# Patient Record
Sex: MALE | Race: BLACK OR AFRICAN AMERICAN | NOT HISPANIC OR LATINO | Employment: PART TIME | URBAN - METROPOLITAN AREA
[De-identification: names, ages, dates, MRNs, and addresses within clinical notes are randomized per-mention and may not be internally consistent; named-entity substitution may affect disease eponyms.]

---

## 2017-02-24 RX ORDER — ALLOPURINOL 100 MG/1
TABLET ORAL
Qty: 90 TABLET | Refills: 3 | Status: SHIPPED | OUTPATIENT
Start: 2017-02-24 | End: 2018-02-19 | Stop reason: SDUPTHER

## 2017-03-20 RX ORDER — SIMVASTATIN 40 MG/1
TABLET, FILM COATED ORAL
Qty: 90 TABLET | Refills: 0 | Status: SHIPPED | OUTPATIENT
Start: 2017-03-20 | End: 2017-09-01 | Stop reason: SDUPTHER

## 2017-06-12 RX ORDER — MELOXICAM 7.5 MG/1
TABLET ORAL
Qty: 90 TABLET | Refills: 0 | Status: SHIPPED | OUTPATIENT
Start: 2017-06-12 | End: 2017-09-01 | Stop reason: SDUPTHER

## 2017-06-12 RX ORDER — LISINOPRIL AND HYDROCHLOROTHIAZIDE 10; 12.5 MG/1; MG/1
TABLET ORAL
Qty: 90 TABLET | Refills: 0 | Status: SHIPPED | OUTPATIENT
Start: 2017-06-12 | End: 2017-09-01 | Stop reason: SDUPTHER

## 2017-09-05 RX ORDER — SIMVASTATIN 40 MG/1
TABLET, FILM COATED ORAL
Qty: 90 TABLET | Refills: 0 | Status: SHIPPED | OUTPATIENT
Start: 2017-09-05 | End: 2017-12-06 | Stop reason: SDUPTHER

## 2017-09-05 RX ORDER — LISINOPRIL AND HYDROCHLOROTHIAZIDE 10; 12.5 MG/1; MG/1
TABLET ORAL
Qty: 90 TABLET | Refills: 0 | Status: SHIPPED | OUTPATIENT
Start: 2017-09-05 | End: 2017-12-02 | Stop reason: SDUPTHER

## 2017-09-05 RX ORDER — MELOXICAM 7.5 MG/1
TABLET ORAL
Qty: 90 TABLET | Refills: 0 | Status: SHIPPED | OUTPATIENT
Start: 2017-09-05 | End: 2017-12-02 | Stop reason: SDUPTHER

## 2017-12-03 RX ORDER — LISINOPRIL AND HYDROCHLOROTHIAZIDE 10; 12.5 MG/1; MG/1
TABLET ORAL
Qty: 90 TABLET | Refills: 0 | Status: SHIPPED | OUTPATIENT
Start: 2017-12-03 | End: 2018-03-04 | Stop reason: SDUPTHER

## 2017-12-03 RX ORDER — MELOXICAM 7.5 MG/1
TABLET ORAL
Qty: 90 TABLET | Refills: 0 | Status: SHIPPED | OUTPATIENT
Start: 2017-12-03 | End: 2018-03-04 | Stop reason: SDUPTHER

## 2017-12-06 RX ORDER — SIMVASTATIN 40 MG/1
TABLET, FILM COATED ORAL
Qty: 90 TABLET | Refills: 0 | Status: SHIPPED | OUTPATIENT
Start: 2017-12-06 | End: 2018-03-04 | Stop reason: SDUPTHER

## 2018-02-19 RX ORDER — ALLOPURINOL 100 MG/1
TABLET ORAL
Qty: 90 TABLET | Refills: 3 | Status: SHIPPED | OUTPATIENT
Start: 2018-02-19 | End: 2019-03-05 | Stop reason: SDUPTHER

## 2018-03-05 RX ORDER — MELOXICAM 7.5 MG/1
TABLET ORAL
Qty: 90 TABLET | Refills: 0 | Status: SHIPPED | OUTPATIENT
Start: 2018-03-05 | End: 2018-08-21

## 2018-03-05 RX ORDER — SIMVASTATIN 40 MG/1
TABLET, FILM COATED ORAL
Qty: 90 TABLET | Refills: 0 | Status: SHIPPED | OUTPATIENT
Start: 2018-03-05 | End: 2018-06-19 | Stop reason: SDUPTHER

## 2018-03-05 RX ORDER — LISINOPRIL AND HYDROCHLOROTHIAZIDE 10; 12.5 MG/1; MG/1
TABLET ORAL
Qty: 90 TABLET | Refills: 0 | Status: SHIPPED | OUTPATIENT
Start: 2018-03-05 | End: 2018-06-19

## 2018-06-05 RX ORDER — LISINOPRIL AND HYDROCHLOROTHIAZIDE 10; 12.5 MG/1; MG/1
TABLET ORAL
Qty: 90 TABLET | Refills: 0 | OUTPATIENT
Start: 2018-06-05

## 2018-06-05 RX ORDER — MELOXICAM 7.5 MG/1
TABLET ORAL
Qty: 90 TABLET | Refills: 0 | OUTPATIENT
Start: 2018-06-05

## 2018-06-05 NOTE — TELEPHONE ENCOUNTER
Called and spoke with pt wife.  Left message informing Mrs Hurst that Dr Majano is unable to fill his rx because he haven't been seen since 2015.  I also informed her that an appt is scheduling for 06/11/18 @ 7:40, she stated she will give him the message.

## 2018-06-05 NOTE — TELEPHONE ENCOUNTER
Please call patient to inform him that his medication has denied until he comes in for an appt.  Patient has been scheduled for 7:40am, on 6/11.  Please call patient to notify him of his appt. thanks

## 2018-06-08 ENCOUNTER — PATIENT OUTREACH (OUTPATIENT)
Dept: ADMINISTRATIVE | Facility: HOSPITAL | Age: 78
End: 2018-06-08

## 2018-06-08 NOTE — PROGRESS NOTES
[facility] is committed to your overall health.  To help you get the most out of each of your visits, we will review your information to make sure you are up to date on all of your recommended tests and/or procedures.       Your PCP  Samantha Majano MD   found that you may be due for:       Health Maintenance Due   Topic Date Due    TETANUS VACCINE  10/26/1958    Zoster Vaccine  10/26/1998    Pneumococcal (65+) (1 of 2 - PCV13) 10/26/2003     Medicare does not cover all immunizations to be given in the clinic. Check your benefits to ensure that you do not need to receive your immunizations at the pharmacy.          If you have had any of the above done at another facility, please bring the records or information with you so that your record at [facility] will be complete.  If you would like to schedule any of these, please contact me.     If you are currently taking medication, please bring it with you to your appointment for review.     Also, if you have any type of Advanced Directives, please bring them with you to your office visit so we may scan them into your chart.       Thank you for Choosing [facility] for your healthcare needs.        Additional Information  If you have questions, you can email [email or web address] or call [phone]  to talk to our [facility] staff. Remember, [facility] is NOT to be used for urgent needs. For medical emergencies, dial 911.

## 2018-06-19 ENCOUNTER — LAB VISIT (OUTPATIENT)
Dept: LAB | Facility: OTHER | Age: 78
End: 2018-06-19
Attending: INTERNAL MEDICINE
Payer: MEDICARE

## 2018-06-19 ENCOUNTER — TELEPHONE (OUTPATIENT)
Dept: INTERNAL MEDICINE | Facility: CLINIC | Age: 78
End: 2018-06-19

## 2018-06-19 ENCOUNTER — OFFICE VISIT (OUTPATIENT)
Dept: INTERNAL MEDICINE | Facility: CLINIC | Age: 78
End: 2018-06-19
Attending: INTERNAL MEDICINE
Payer: MEDICARE

## 2018-06-19 VITALS
WEIGHT: 246.06 LBS | HEART RATE: 74 BPM | HEIGHT: 74 IN | SYSTOLIC BLOOD PRESSURE: 130 MMHG | BODY MASS INDEX: 31.58 KG/M2 | DIASTOLIC BLOOD PRESSURE: 60 MMHG

## 2018-06-19 DIAGNOSIS — E87.5 HYPERKALEMIA: Primary | ICD-10-CM

## 2018-06-19 DIAGNOSIS — Z00.00 ANNUAL PHYSICAL EXAM: ICD-10-CM

## 2018-06-19 DIAGNOSIS — R73.03 PRE-DIABETES: ICD-10-CM

## 2018-06-19 DIAGNOSIS — M10.071 IDIOPATHIC GOUT OF RIGHT FOOT, UNSPECIFIED CHRONICITY: ICD-10-CM

## 2018-06-19 DIAGNOSIS — Z12.11 COLON CANCER SCREENING: ICD-10-CM

## 2018-06-19 DIAGNOSIS — Z00.00 ANNUAL PHYSICAL EXAM: Primary | ICD-10-CM

## 2018-06-19 DIAGNOSIS — R79.9 ABNORMAL FINDING OF BLOOD CHEMISTRY: ICD-10-CM

## 2018-06-19 DIAGNOSIS — E78.00 HYPERCHOLESTEROLEMIA: ICD-10-CM

## 2018-06-19 DIAGNOSIS — I10 HTN (HYPERTENSION), BENIGN: ICD-10-CM

## 2018-06-19 LAB
ALBUMIN SERPL BCP-MCNC: 4 G/DL
ALP SERPL-CCNC: 129 U/L
ALT SERPL W/O P-5'-P-CCNC: 29 U/L
ANION GAP SERPL CALC-SCNC: 9 MMOL/L
AST SERPL-CCNC: 24 U/L
BASOPHILS # BLD AUTO: 0.07 K/UL
BASOPHILS NFR BLD: 1 %
BILIRUB SERPL-MCNC: 0.6 MG/DL
BUN SERPL-MCNC: 26 MG/DL
CALCIUM SERPL-MCNC: 10.1 MG/DL
CHLORIDE SERPL-SCNC: 107 MMOL/L
CHOLEST SERPL-MCNC: 144 MG/DL
CHOLEST/HDLC SERPL: 4.4 {RATIO}
CO2 SERPL-SCNC: 21 MMOL/L
CREAT SERPL-MCNC: 1.6 MG/DL
DIFFERENTIAL METHOD: ABNORMAL
EOSINOPHIL # BLD AUTO: 0.3 K/UL
EOSINOPHIL NFR BLD: 3.7 %
ERYTHROCYTE [DISTWIDTH] IN BLOOD BY AUTOMATED COUNT: 13.4 %
EST. GFR  (AFRICAN AMERICAN): 47 ML/MIN/1.73 M^2
EST. GFR  (NON AFRICAN AMERICAN): 40 ML/MIN/1.73 M^2
ESTIMATED AVG GLUCOSE: 134 MG/DL
GLUCOSE SERPL-MCNC: 126 MG/DL
HBA1C MFR BLD HPLC: 6.3 %
HCT VFR BLD AUTO: 36.6 %
HDLC SERPL-MCNC: 33 MG/DL
HDLC SERPL: 22.9 %
HGB BLD-MCNC: 11.8 G/DL
LDLC SERPL CALC-MCNC: 90.8 MG/DL
LYMPHOCYTES # BLD AUTO: 2.3 K/UL
LYMPHOCYTES NFR BLD: 31.7 %
MCH RBC QN AUTO: 28.6 PG
MCHC RBC AUTO-ENTMCNC: 32.2 G/DL
MCV RBC AUTO: 89 FL
MONOCYTES # BLD AUTO: 0.6 K/UL
MONOCYTES NFR BLD: 8 %
NEUTROPHILS # BLD AUTO: 4 K/UL
NEUTROPHILS NFR BLD: 55.2 %
NONHDLC SERPL-MCNC: 111 MG/DL
PLATELET # BLD AUTO: 303 K/UL
PMV BLD AUTO: 9.6 FL
POTASSIUM SERPL-SCNC: 5.7 MMOL/L
PROT SERPL-MCNC: 8.3 G/DL
RBC # BLD AUTO: 4.13 M/UL
SODIUM SERPL-SCNC: 137 MMOL/L
TRIGL SERPL-MCNC: 101 MG/DL
TSH SERPL DL<=0.005 MIU/L-ACNC: 2.06 UIU/ML
URATE SERPL-MCNC: 7.6 MG/DL
WBC # BLD AUTO: 7.23 K/UL

## 2018-06-19 PROCEDURE — 80053 COMPREHEN METABOLIC PANEL: CPT

## 2018-06-19 PROCEDURE — 85025 COMPLETE CBC W/AUTO DIFF WBC: CPT

## 2018-06-19 PROCEDURE — 84443 ASSAY THYROID STIM HORMONE: CPT

## 2018-06-19 PROCEDURE — 3078F DIAST BP <80 MM HG: CPT | Mod: CPTII,S$GLB,, | Performed by: INTERNAL MEDICINE

## 2018-06-19 PROCEDURE — 99214 OFFICE O/P EST MOD 30 MIN: CPT | Mod: S$GLB,,, | Performed by: INTERNAL MEDICINE

## 2018-06-19 PROCEDURE — 83036 HEMOGLOBIN GLYCOSYLATED A1C: CPT

## 2018-06-19 PROCEDURE — 99999 PR PBB SHADOW E&M-EST. PATIENT-LVL III: CPT | Mod: PBBFAC,,, | Performed by: INTERNAL MEDICINE

## 2018-06-19 PROCEDURE — 80061 LIPID PANEL: CPT

## 2018-06-19 PROCEDURE — 36415 COLL VENOUS BLD VENIPUNCTURE: CPT

## 2018-06-19 PROCEDURE — 84550 ASSAY OF BLOOD/URIC ACID: CPT

## 2018-06-19 PROCEDURE — 3075F SYST BP GE 130 - 139MM HG: CPT | Mod: CPTII,S$GLB,, | Performed by: INTERNAL MEDICINE

## 2018-06-19 RX ORDER — LOSARTAN POTASSIUM 100 MG/1
100 TABLET ORAL DAILY
Qty: 90 TABLET | Refills: 3 | Status: SHIPPED | OUTPATIENT
Start: 2018-06-19 | End: 2019-06-06 | Stop reason: SDUPTHER

## 2018-06-19 RX ORDER — SIMVASTATIN 40 MG/1
40 TABLET, FILM COATED ORAL NIGHTLY
Qty: 90 TABLET | Refills: 3 | Status: SHIPPED | OUTPATIENT
Start: 2018-06-19 | End: 2019-06-06 | Stop reason: SDUPTHER

## 2018-06-19 NOTE — TELEPHONE ENCOUNTER
I have reviewed the provider's instructions with the patient, answering all questions to his satisfaction.      Pt verified he would not take meloxicam, losartan, or any NSAIDS. Pt verified he would avoid high potassium foods, drink extra water, and  the kayexalate. Pt informed that this will cause diarrhea and help lower his potassium to a normal level. Pt informed of pcp advice to have lab redrawn on Friday. Pt states he will try to make it to the lab on Friday but if he cant he will call our office to reschedule this. Pt demonstrated verbal understanding of information and had no further questions or concerns at this time.

## 2018-06-19 NOTE — PROGRESS NOTES
"Subjective:       Patient ID: Tip Hurst is a 77 y.o. male.    Chief Complaint: Establish Care    Here to establish care    Has not been to doctor since 2015. Thought everything was good since he did not receive a reminder for appt.    Patient presents today for routine evaluation, physical, and labs. Patient has no major concerns or complaints today. He states he feels well and has no complaints. Mood is great. 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 Uses a cane. Denies falls or knee giving out. Was told by ortho not to come back unless he is ready for surgery. He does not recall that MD's name    Mild GERD controlled with nightly zantac.    Gout last flare up several years ago, right big toe. Allopurinol          Review of Systems   Constitutional: Negative for appetite change, chills, fever and unexpected weight change.   HENT: Negative for hearing loss, sore throat and trouble swallowing.    Eyes: Negative for visual disturbance.   Respiratory: Negative for cough, chest tightness and shortness of breath.    Cardiovascular: Negative for chest pain and leg swelling.   Gastrointestinal: Negative for abdominal pain, blood in stool, constipation, diarrhea, nausea and vomiting.   Endocrine: Negative for polydipsia and polyuria.   Genitourinary: Negative for decreased urine volume, difficulty urinating, dysuria, frequency and urgency.   Musculoskeletal: Positive for arthralgias. Negative for gait problem.   Skin: Negative for rash.   Neurological: Negative for dizziness and numbness.   Psychiatric/Behavioral: The patient is not nervous/anxious.        Objective:      Vitals:    06/19/18 0949   BP: 130/60   Pulse: 74   Weight: 111.6 kg (246 lb 0.5 oz)   Height: 6' 2" (1.88 m)      Physical Exam   Constitutional: He is oriented to person, place, and time. He appears well-developed and well-nourished. No distress.   HENT:   Head: Normocephalic and atraumatic.   Mouth/Throat: " Oropharynx is clear and moist. No oropharyngeal exudate.   Eyes: Conjunctivae and EOM are normal. Pupils are equal, round, and reactive to light. No scleral icterus.   Neck: No thyromegaly present.   Cardiovascular: Normal rate, regular rhythm and normal heart sounds.    No murmur heard.  Pulmonary/Chest: Effort normal and breath sounds normal. He has no wheezes. He has no rales.   Abdominal: Soft. He exhibits no distension. There is no tenderness.   Musculoskeletal: He exhibits no edema or tenderness.   Lymphadenopathy:     He has no cervical adenopathy.   Neurological: He is alert and oriented to person, place, and time.   Skin: Skin is warm and dry.   Psychiatric: He has a normal mood and affect. His behavior is normal.       Assessment:       1. Annual physical exam    2. Colon cancer screening    3. Idiopathic gout of right foot, unspecified chronicity    4. HTN (hypertension), benign    5. Hypercholesterolemia    6. Abnormal finding of blood chemistry     7. Pre-diabetes        Plan:       Tip was seen today for establish care.    Diagnoses and all orders for this visit:    Annual physical exam  -     Comprehensive metabolic panel; Future  -     Lipid panel; Future  -     TSH; Future  -     CBC auto differential; Future  -     Hemoglobin A1c; Future    Colon cancer screening  -     Case request GI: COLONOSCOPY    Idiopathic gout of right foot, unspecified chronicity  -     Uric acid; Future    HTN (hypertension), benign  Stop HCTZ and lisinopril. Start losartan  -     Comprehensive metabolic panel; Future  -     TSH; Future  f/u in 3-4 weeks for nurse visit for BP check  -     losartan (COZAAR) 100 MG tablet; Take 1 tablet (100 mg total) by mouth once daily.    Hypercholesterolemia  -     Lipid panel; Future    -     simvastatin (ZOCOR) 40 MG tablet; Take 1 tablet (40 mg total) by mouth every evening.    Pre-diabetes  -     Hemoglobin A1c; Future    RTC in 6 months or sooner prn                Side effects  of medication(s) were discussed in detail and patient voiced understanding.  Patient will call back for any issues or complications.

## 2018-06-19 NOTE — PATIENT INSTRUCTIONS

## 2018-06-19 NOTE — TELEPHONE ENCOUNTER
Please contact pt and let him know his potassium is elevated. Stop meloxicam, hold new BP medication, drinks plenty of fluids. Please inform patient that his kidney function has worsened some and his potassium is elevated. I have sent in a medication for the potassium level. This will cause diarrhea, unfortunately this is how it works. Also please avoid high potassium containing foods such as bananas, avocados, white beans, spinach, baked potatoes, and yogurt. Now and indefinitely avoid meloxicam and NSAIDS such as Advil, ibuprofen, motrin, goody's, aspirin. I also need him to hold his losartan for the time being. Need to repeat labs at end of week.    Sugars levels are rising. Please reduce sugary drinks and complex carbohydrates.    Please increase allopurinol to 200mg daily.

## 2018-06-22 ENCOUNTER — LAB VISIT (OUTPATIENT)
Dept: LAB | Facility: OTHER | Age: 78
End: 2018-06-22
Attending: INTERNAL MEDICINE
Payer: MEDICARE

## 2018-06-22 DIAGNOSIS — E87.5 HYPERKALEMIA: ICD-10-CM

## 2018-06-22 LAB
ANION GAP SERPL CALC-SCNC: 12 MMOL/L
BUN SERPL-MCNC: 26 MG/DL
CALCIUM SERPL-MCNC: 9.7 MG/DL
CHLORIDE SERPL-SCNC: 102 MMOL/L
CO2 SERPL-SCNC: 22 MMOL/L
CREAT SERPL-MCNC: 1.7 MG/DL
EST. GFR  (AFRICAN AMERICAN): 43 ML/MIN/1.73 M^2
EST. GFR  (NON AFRICAN AMERICAN): 38 ML/MIN/1.73 M^2
GLUCOSE SERPL-MCNC: 112 MG/DL
POTASSIUM SERPL-SCNC: 4.2 MMOL/L
SODIUM SERPL-SCNC: 136 MMOL/L

## 2018-06-22 PROCEDURE — 36415 COLL VENOUS BLD VENIPUNCTURE: CPT

## 2018-06-22 PROCEDURE — 80048 BASIC METABOLIC PNL TOTAL CA: CPT

## 2018-06-25 ENCOUNTER — TELEPHONE (OUTPATIENT)
Dept: INTERNAL MEDICINE | Facility: CLINIC | Age: 78
End: 2018-06-25

## 2018-06-25 ENCOUNTER — TELEPHONE (OUTPATIENT)
Dept: ENDOSCOPY | Facility: HOSPITAL | Age: 78
End: 2018-06-25

## 2018-06-25 DIAGNOSIS — N18.30 CKD (CHRONIC KIDNEY DISEASE) STAGE 3, GFR 30-59 ML/MIN: Primary | ICD-10-CM

## 2018-06-25 NOTE — TELEPHONE ENCOUNTER
Please notify patient of results:  Potassium is back to normal but kincey function has remained the same. Now we protect the kidneys, get an ultrasound to see what they look like and have to see a kidney doctor.  It is important to take all prescribed medications on a consitent basis to ensure control of chronic medical conditions such as high blood pressure and diabetes.  If these conditions are left uncotrolled they can further damage the kidneys. It is also important for you to avoid over the counter non-steroidal anti-inflammatory (aka NSAIDS) pain medications such as ibuprofen, naproxen, naprosyn, Advil, Motrin, Aleve, Goody's powder. You can longer take meloxicam. Tylenol for aches and pains.

## 2018-06-25 NOTE — TELEPHONE ENCOUNTER
Spoke with pt and gave all the recommendations of Dr. Gallardo and scheduled the US of the Kidney. Pt verbalized understanding.

## 2018-07-17 ENCOUNTER — HOSPITAL ENCOUNTER (OUTPATIENT)
Dept: RADIOLOGY | Facility: OTHER | Age: 78
Discharge: HOME | End: 2018-07-17
Attending: INTERNAL MEDICINE
Payer: MEDICARE

## 2018-07-17 ENCOUNTER — CLINICAL SUPPORT (OUTPATIENT)
Dept: INTERNAL MEDICINE | Facility: CLINIC | Age: 78
End: 2018-07-17
Payer: MEDICARE

## 2018-07-17 ENCOUNTER — TELEPHONE (OUTPATIENT)
Dept: INTERNAL MEDICINE | Facility: CLINIC | Age: 78
End: 2018-07-17

## 2018-07-17 VITALS — SYSTOLIC BLOOD PRESSURE: 140 MMHG | HEART RATE: 67 BPM | OXYGEN SATURATION: 99 % | DIASTOLIC BLOOD PRESSURE: 80 MMHG

## 2018-07-17 DIAGNOSIS — N32.9 LESION OF BLADDER: Primary | ICD-10-CM

## 2018-07-17 DIAGNOSIS — N18.30 CKD (CHRONIC KIDNEY DISEASE) STAGE 3, GFR 30-59 ML/MIN: ICD-10-CM

## 2018-07-17 PROCEDURE — 76770 US EXAM ABDO BACK WALL COMP: CPT | Mod: TC

## 2018-07-17 PROCEDURE — 99999 PR PBB SHADOW E&M-EST. PATIENT-LVL III: CPT | Mod: PBBFAC,,,

## 2018-07-17 PROCEDURE — 76770 US EXAM ABDO BACK WALL COMP: CPT | Mod: 26,,, | Performed by: RADIOLOGY

## 2018-07-17 RX ORDER — AMLODIPINE BESYLATE 5 MG/1
5 TABLET ORAL DAILY
Qty: 90 TABLET | Refills: 2 | Status: SHIPPED | OUTPATIENT
Start: 2018-07-17 | End: 2019-04-15 | Stop reason: SDUPTHER

## 2018-07-17 NOTE — PROGRESS NOTES
BP uncontrolled. Low salt diet and add medication amlodipine and f/u in 3-4 weeks for nurse visit for BP check. Advise pt to stop taking meloxicam due to CKD 3

## 2018-07-17 NOTE — TELEPHONE ENCOUNTER
Spoke with pt regarding the note below. Pt understood and conversation ended. Pt was scheduled with Urology

## 2018-07-17 NOTE — TELEPHONE ENCOUNTER
Please notify patient of results:  Ultrasound of uche is overall okay but there is a spot seen on the bladder wall. Recommendations are the either have CT scan with contrast vs have a urologist take a look inside your bladder. I would like for you to have this discussion with a urologist to see what the best, most efficient next step is.

## 2018-07-17 NOTE — PROGRESS NOTES
Tip Hurst 77 y.o. male is here today for Blood Pressure check.   History of HTN yes.    Review of patient's allergies indicates:  No Known Allergies  Creatinine   Date Value Ref Range Status   06/22/2018 1.7 (H) 0.5 - 1.4 mg/dL Final     Sodium   Date Value Ref Range Status   06/22/2018 136 136 - 145 mmol/L Final     Potassium   Date Value Ref Range Status   06/22/2018 4.2 3.5 - 5.1 mmol/L Final   ]  Patient verifies taking blood pressure medications on a regular basis at the same time of the day.     Current Outpatient Prescriptions:     allopurinol (ZYLOPRIM) 100 MG tablet, TAKE 1 TABLET BY MOUTH EVERY DAY, Disp: 90 tablet, Rfl: 3    losartan (COZAAR) 100 MG tablet, Take 1 tablet (100 mg total) by mouth once daily., Disp: 90 tablet, Rfl: 3    meloxicam (MOBIC) 7.5 MG tablet, TAKE 1 TABLET BY MOUTH DAILY AS NEEDED., Disp: 90 tablet, Rfl: 0    ranitidine (ZANTAC) 300 MG tablet, TAKE 1 TABLET BY MOUTH EVERY EVENING, Disp: 90 tablet, Rfl: 3    simvastatin (ZOCOR) 40 MG tablet, Take 1 tablet (40 mg total) by mouth every evening., Disp: 90 tablet, Rfl: 3  Does patient have record of home blood pressure readings no.    Last dose of blood pressure medication was taken at 8:00am on 07/16/2018.  Patient is asymptomatic.   Pt denies any c/o headaches, dizziness, blurred vision, chest pain ,sob , numbness or tingling.    BP: (!) 140/80 , Pulse: 67 .      Dr. Gallardo notified.

## 2018-07-17 NOTE — PATIENT INSTRUCTIONS
Tip Hurst 77 y.o. male is here today for Blood Pressure check.   History of HTN yes.    Review of patient's allergies indicates:  No Known Allergies  Creatinine   Date Value Ref Range Status   06/22/2018 1.7 (H) 0.5 - 1.4 mg/dL Final     Sodium   Date Value Ref Range Status   06/22/2018 136 136 - 145 mmol/L Final     Potassium   Date Value Ref Range Status   06/22/2018 4.2 3.5 - 5.1 mmol/L Final   ]  Patient verifies taking blood pressure medications on a regular basis at the same time of the day.     Current Outpatient Prescriptions:     allopurinol (ZYLOPRIM) 100 MG tablet, TAKE 1 TABLET BY MOUTH EVERY DAY, Disp: 90 tablet, Rfl: 3    losartan (COZAAR) 100 MG tablet, Take 1 tablet (100 mg total) by mouth once daily., Disp: 90 tablet, Rfl: 3    meloxicam (MOBIC) 7.5 MG tablet, TAKE 1 TABLET BY MOUTH DAILY AS NEEDED., Disp: 90 tablet, Rfl: 0    ranitidine (ZANTAC) 300 MG tablet, TAKE 1 TABLET BY MOUTH EVERY EVENING, Disp: 90 tablet, Rfl: 3    simvastatin (ZOCOR) 40 MG tablet, Take 1 tablet (40 mg total) by mouth every evening., Disp: 90 tablet, Rfl: 3  Does patient have record of home blood pressure readings no.    Last dose of blood pressure medication was taken at 8:00am on 07/16/2018.  Patient is asymptomatic.   Pt denies any c/o headaches, dizziness, blurred vision, chest pain ,sob , numbness or tingling.    BP: (!) 140/80 , Pulse: 67 .

## 2018-08-21 ENCOUNTER — OFFICE VISIT (OUTPATIENT)
Dept: UROLOGY | Facility: CLINIC | Age: 78
End: 2018-08-21
Attending: INTERNAL MEDICINE
Payer: MEDICARE

## 2018-08-21 VITALS
DIASTOLIC BLOOD PRESSURE: 78 MMHG | SYSTOLIC BLOOD PRESSURE: 167 MMHG | BODY MASS INDEX: 31.58 KG/M2 | HEART RATE: 74 BPM | HEIGHT: 74 IN | WEIGHT: 246.06 LBS

## 2018-08-21 DIAGNOSIS — R93.41 ABNORMAL ULTRASOUND OF BLADDER: Primary | ICD-10-CM

## 2018-08-21 DIAGNOSIS — C61 PROSTATE CANCER: ICD-10-CM

## 2018-08-21 PROCEDURE — 99203 OFFICE O/P NEW LOW 30 MIN: CPT | Mod: S$GLB,,, | Performed by: UROLOGY

## 2018-08-21 PROCEDURE — 3078F DIAST BP <80 MM HG: CPT | Mod: CPTII,S$GLB,, | Performed by: UROLOGY

## 2018-08-21 PROCEDURE — 3077F SYST BP >= 140 MM HG: CPT | Mod: CPTII,S$GLB,, | Performed by: UROLOGY

## 2018-08-21 NOTE — PROGRESS NOTES
"Subjective:      Tip Hurst is a 77 y.o. male who was referred by Moe Gallardo MD for evaluation of bladder lesion.      Incidental finding of possible bladder lesion on RBUS (for [facility]I).     He has history of prostate cancer s/p prostatectomy in 2006. No known recurrence. No adjuvant treatments reported. Path unknown.    He denies any voiding c/o including any history of hematuria.    The following portions of the patient's history were reviewed and updated as appropriate: allergies, current medications, past family history, past medical history, past social history, past surgical history and problem list.    Review of Systems  Constitutional: no fever or chills  ENT: no nasal congestion or sore throat  Respiratory: no cough or shortness of breath  Cardiovascular: no chest pain or palpitations  Gastrointestinal: no nausea or vomiting, tolerating diet  Genitourinary: as per HPI  Hematologic/Lymphatic: no easy bruising or lymphadenopathy  Musculoskeletal: no arthralgias or myalgias  Neurological: no seizures or tremors  Behavioral/Psych: no auditory or visual hallucinations     Objective:   Vitals: BP (!) 167/78 (BP Location: Left arm, Patient Position: Sitting, BP Method: Large (Automatic))   Pulse 74   Ht 6' 2" (1.88 m)   Wt 111.6 kg (246 lb 0.5 oz)   BMI 31.59 kg/m²     Physical Exam   General: alert and oriented, no acute distress  Head: normocephalic, atraumatic  Neck: supple, no lymphadenopathy, normal ROM, no masses  Respiratory: Symmetric expansion, non-labored breathing  Cardiovascular: regular rate and rhythm, nomal pulses, no peripheral edema  Skin: normal coloration and turgor, no rashes, no suspicious skin lesions noted  Neuro: alert and oriented x3, no gross deficits  Psych: normal judgment and insight, normal mood/affect and non-anxious    Lab Review     Lab Results   Component Value Date    WBC 7.23 06/19/2018    HGB 11.8 (L) 06/19/2018    HCT 36.6 (L) 06/19/2018    MCV 89 06/19/2018    "  06/19/2018     Lab Results   Component Value Date    CREATININE 1.7 (H) 06/22/2018    BUN 26 (H) 06/22/2018     Lab Results   Component Value Date    PSA 0.26 09/05/2013     Imaging (all images personally reviewed; agree with report below)  Results for orders placed during the hospital encounter of 07/17/18   US Retroperitoneal Complete (Kidney and    Narrative EXAMINATION:  US RETROPERITONEAL COMPLETE    CLINICAL HISTORY:  Chronic kidney disease, stage 3 (moderate)    TECHNIQUE:  Ultrasound of the kidneys and urinary bladder was performed including color flow and Doppler evaluation of the kidneys.    COMPARISON:  None.    FINDINGS:  Both kidneys are mildly lobular in contour and demonstrate mild increased cortical echogenicity.  Both kidneys are normal in size, the right kidney measures 11 cm in length and the left kidney measures 12 cm.    No hydronephrosis.  No renal mass.    Duplex Doppler images show mildly elevated bilateral renal arterial resistive indices, the right measures 0.90 and the left measures 0.84.    The urinary bladder is difficult to characterize secondary to lack of distention, but the superior aspect of the urinary bladder wall demonstrates irregular mixed cystic and solid wall thickening.  No associated hypervascularity.      Impression Incompletely evaluated mixed cystic and solid wall thickening of the superior wall of the urinary bladder.  Recommend additional evaluation with cystoscopy or CT cystogram with contrast, as clinically indicated.    Bilateral medical renal disease.      Electronically signed by: Abe Velarde MD  Date:    07/17/2018  Time:    10:39          Assessment:     1. Abnormal ultrasound of bladder    2. Prostate cancer        Plan:   1. Will schedule cysto to r/o bladder lesion  2. Check PSA - was elevated at last check in 2013 and had been trending up prior to thata

## 2018-08-21 NOTE — LETTER
August 21, 2018      Moe Gallardo MD  [address]  [address]  [facility] [address]           [facility]  [address] [place] [address], [facility] [address]  [facility] [address]  Phone: [phone]  Fax: [phone]          Patient: Tip Hurst   MR Number: [id number]   YOB: 1940   Date of Visit: 8/21/2018       Dear Dr. Moe Gallardo:    Thank you for referring Tip Hurst to me for evaluation. Attached you will find relevant portions of my assessment and plan of care.    If you have questions, please do not hesitate to call me. I look forward to following Tip Hurst along with you.    Sincerely,    Olivier West MD    Enclosure  CC:  No Recipients    If you would like to receive this communication electronically, please contact [email or web address] or [phone] to request more information on [facility] access.    For providers and/or their staff who would like to refer a patient to [facility], please contact us through our one-stop-shop provider referral line, [facility], at [phone].    If you feel you have received this communication in error or would no longer like to receive these types of communications, please e-mail [email or web address]

## 2018-09-12 ENCOUNTER — TELEPHONE (OUTPATIENT)
Dept: UROLOGY | Facility: CLINIC | Age: 78
End: 2018-09-12

## 2018-09-12 NOTE — TELEPHONE ENCOUNTER
Pt is currently scheduled on 9/18 at 10 am to have a Cysto with . Pt last saw you on 8/21 and per your notes schld cysto to r/o bladder lesion. Should pt be scheduled with you or  please advise.

## 2018-09-13 DIAGNOSIS — R93.5 ABNORMAL US (ULTRASOUND) OF ABDOMEN: Primary | ICD-10-CM

## 2018-09-18 ENCOUNTER — PROCEDURE VISIT (OUTPATIENT)
Dept: UROLOGY | Facility: CLINIC | Age: 78
End: 2018-09-18
Payer: MEDICARE

## 2018-09-18 VITALS
SYSTOLIC BLOOD PRESSURE: 181 MMHG | HEIGHT: 74 IN | DIASTOLIC BLOOD PRESSURE: 82 MMHG | BODY MASS INDEX: 30.98 KG/M2 | HEART RATE: 79 BPM | WEIGHT: 241.38 LBS

## 2018-09-18 DIAGNOSIS — N32.9 LESION OF BLADDER: Primary | ICD-10-CM

## 2018-09-18 DIAGNOSIS — R97.20 ELEVATED PROSTATE SPECIFIC ANTIGEN (PSA): ICD-10-CM

## 2018-09-18 LAB
BILIRUB SERPL-MCNC: ABNORMAL MG/DL
BLOOD URINE, POC: ABNORMAL
COLOR, POC UA: ABNORMAL
GLUCOSE UR QL STRIP: NORMAL
KETONES UR QL STRIP: ABNORMAL
LEUKOCYTE ESTERASE URINE, POC: ABNORMAL
NITRITE, POC UA: ABNORMAL
PH, POC UA: 5
PROTEIN, POC: ABNORMAL
SPECIFIC GRAVITY, POC UA: 1.02
UROBILINOGEN, POC UA: NORMAL

## 2018-09-18 PROCEDURE — 81002 URINALYSIS NONAUTO W/O SCOPE: CPT | Mod: S$GLB,,, | Performed by: UROLOGY

## 2018-09-18 PROCEDURE — 52000 CYSTOURETHROSCOPY: CPT | Mod: S$GLB,,, | Performed by: UROLOGY

## 2018-09-18 RX ORDER — CIPROFLOXACIN 500 MG/1
500 TABLET ORAL
Status: COMPLETED | OUTPATIENT
Start: 2018-09-18 | End: 2018-09-18

## 2018-09-18 RX ORDER — LIDOCAINE HYDROCHLORIDE 20 MG/ML
JELLY TOPICAL
Status: COMPLETED | OUTPATIENT
Start: 2018-09-18 | End: 2018-09-18

## 2018-09-18 RX ADMIN — CIPROFLOXACIN 500 MG: 500 TABLET ORAL at 11:09

## 2018-09-18 RX ADMIN — LIDOCAINE HYDROCHLORIDE 5 ML: 20 JELLY TOPICAL at 11:09

## 2018-09-18 NOTE — PROCEDURES
"Cystoscopy  Date/Time: 9/18/2018 10:39 AM  Performed by: Jordan Tidwell MD  Authorized by: Jordan Tidwell MD     Consent Done?:  Yes (Written)  Time out: Immediately prior to procedure a "time out" was called to verify the correct patient, procedure, equipment, support staff and site/side marked as required.    Anesthesia:  Lidocaine jelly  Patient sedated?: No    Preparation: Patient was prepped and draped in usual sterile fashion      Scope type:  Flexible cystoscope  Urethra normal: Yes  Bladder neck normal: Bladder neck normal   Bladder normal: Yes      Patient tolerance:  Patient tolerated the procedure well with no immediate complications     SANDIE NPR  Lab Results       Component                Value               Date                       PSA                      0.26                09/05/2013                 PSA                      0.25                07/23/2013                 PSA                      0.18                08/21/2012                 PSADIAG                  0.25                08/21/2018              Prostate ca biochemical recurrence  Normal cysto    Follow up with Dr West 6 months with psa      "

## 2019-01-18 ENCOUNTER — TELEPHONE (OUTPATIENT)
Dept: INTERNAL MEDICINE | Facility: CLINIC | Age: 79
End: 2019-01-18

## 2019-01-18 NOTE — TELEPHONE ENCOUNTER
Spoke with pt and [facility] bp ck. Pt verbally understood and had no further questions.

## 2019-03-05 RX ORDER — ALLOPURINOL 100 MG/1
100 TABLET ORAL DAILY
Qty: 90 TABLET | Refills: 0 | Status: SHIPPED | OUTPATIENT
Start: 2019-03-05 | End: 2019-11-08 | Stop reason: SDUPTHER

## 2019-03-05 RX ORDER — ALLOPURINOL 100 MG/1
TABLET ORAL
Qty: 90 TABLET | Refills: 3 | Status: SHIPPED | OUTPATIENT
Start: 2019-03-05 | End: 2019-03-05 | Stop reason: SDUPTHER

## 2019-03-14 ENCOUNTER — LAB VISIT (OUTPATIENT)
Dept: LAB | Facility: OTHER | Age: 79
End: 2019-03-14
Attending: UROLOGY
Payer: MEDICARE

## 2019-03-14 DIAGNOSIS — R93.5 ABNORMAL US (ULTRASOUND) OF ABDOMEN: ICD-10-CM

## 2019-03-14 DIAGNOSIS — R97.20 ELEVATED PROSTATE SPECIFIC ANTIGEN (PSA): ICD-10-CM

## 2019-03-14 DIAGNOSIS — N32.9 LESION OF BLADDER: ICD-10-CM

## 2019-03-14 LAB — COMPLEXED PSA SERPL-MCNC: 0.31 NG/ML

## 2019-03-14 PROCEDURE — 36415 COLL VENOUS BLD VENIPUNCTURE: CPT

## 2019-03-14 PROCEDURE — 84153 ASSAY OF PSA TOTAL: CPT

## 2019-03-21 ENCOUNTER — OFFICE VISIT (OUTPATIENT)
Dept: UROLOGY | Facility: CLINIC | Age: 79
End: 2019-03-21
Attending: UROLOGY
Payer: MEDICARE

## 2019-03-21 VITALS
SYSTOLIC BLOOD PRESSURE: 176 MMHG | BODY MASS INDEX: 30.98 KG/M2 | HEART RATE: 82 BPM | HEIGHT: 74 IN | DIASTOLIC BLOOD PRESSURE: 78 MMHG | WEIGHT: 241.38 LBS

## 2019-03-21 DIAGNOSIS — C61 PROSTATE CANCER: Primary | ICD-10-CM

## 2019-03-21 PROCEDURE — 3077F PR MOST RECENT SYSTOLIC BLOOD PRESSURE >= 140 MM HG: ICD-10-PCS | Mod: CPTII,S$GLB,, | Performed by: UROLOGY

## 2019-03-21 PROCEDURE — 1101F PR PT FALLS ASSESS DOC 0-1 FALLS W/OUT INJ PAST YR: ICD-10-PCS | Mod: CPTII,S$GLB,, | Performed by: UROLOGY

## 2019-03-21 PROCEDURE — 1101F PT FALLS ASSESS-DOCD LE1/YR: CPT | Mod: CPTII,S$GLB,, | Performed by: UROLOGY

## 2019-03-21 PROCEDURE — 99213 PR OFFICE/OUTPT VISIT, EST, LEVL III, 20-29 MIN: ICD-10-PCS | Mod: S$GLB,,, | Performed by: UROLOGY

## 2019-03-21 PROCEDURE — 3078F PR MOST RECENT DIASTOLIC BLOOD PRESSURE < 80 MM HG: ICD-10-PCS | Mod: CPTII,S$GLB,, | Performed by: UROLOGY

## 2019-03-21 PROCEDURE — 99213 OFFICE O/P EST LOW 20 MIN: CPT | Mod: S$GLB,,, | Performed by: UROLOGY

## 2019-03-21 PROCEDURE — 3078F DIAST BP <80 MM HG: CPT | Mod: CPTII,S$GLB,, | Performed by: UROLOGY

## 2019-03-21 PROCEDURE — 3077F SYST BP >= 140 MM HG: CPT | Mod: CPTII,S$GLB,, | Performed by: UROLOGY

## 2019-03-21 NOTE — PROGRESS NOTES
"Subjective:      Tip Hurst is a 78 y.o. male who returns today regarding his prostate cancer.    He has history of prostate cancer s/p prostatectomy in 2006. No known recurrence. No adjuvant treatments reported. Path unknown.  He has had biochemical recurrence per PSA testing beginning in 2010 with a very slow rise in PSA in the interim.  He has no associated complaints today.    In 2018 he had incidental finding of a possible bladder lesion on ultrasound.  Cystoscopy was normal at that time.    The following portions of the patient's history were reviewed and updated as appropriate: allergies, current medications, past family history, past medical history, past social history, past surgical history and problem list.    Review of Systems  A comprehensive multipoint review of systems was negative except as otherwise stated in the HPI.     Objective:   Vitals: BP (!) 176/78 (BP Location: Left arm, Patient Position: Sitting, BP Method: Large (Automatic))   Pulse 82   Ht 6' 2" (1.88 m)   Wt 109.5 kg (241 lb 6.5 oz)   BMI 30.99 kg/m²     Physical Exam   General: alert and oriented, no acute distress  Respiratory: Symmetric expansion, non-labored breathing  Neuro: no gross deficits  Psych: normal judgment and insight, normal mood/affect and non-anxious    Lab Review     Lab Results   Component Value Date    WBC 7.23 06/19/2018    HGB 11.8 (L) 06/19/2018    HCT 36.6 (L) 06/19/2018    MCV 89 06/19/2018     06/19/2018     Lab Results   Component Value Date    CREATININE 1.7 (H) 06/22/2018    BUN 26 (H) 06/22/2018     Component PSA, SCREEN PSA DIAGNOSTIC   Latest Ref Rng & Units 0.00 - 4.00 ng/mL 0.00 - 4.00 ng/mL   3/14/2019  0.31   8/21/2018  0.25   9/5/2013 0.26    7/23/2013 0.25    8/21/2012 0.18    5/4/2011 0.03    9/21/2010 0.02    9/25/2009 <0.01    8/6/2008 <0.01    8/9/2007 <0.1    7/13/2006 6.6 (H)    4/19/2005 4.2 (H)        Assessment and Plan:   1. Prostate cancer  -- Biochemical recurrence with " very slow rise in PSA over the last 10 years.  -- No need for additional treatment such as ADT at this time.  -- Follow-up 6 months with PSA

## 2019-04-15 RX ORDER — AMLODIPINE BESYLATE 5 MG/1
TABLET ORAL
Qty: 90 TABLET | Refills: 2 | Status: SHIPPED | OUTPATIENT
Start: 2019-04-15 | End: 2019-11-08 | Stop reason: SDUPTHER

## 2019-06-06 RX ORDER — SIMVASTATIN 40 MG/1
TABLET, FILM COATED ORAL
Qty: 90 TABLET | Refills: 3 | Status: SHIPPED | OUTPATIENT
Start: 2019-06-06 | End: 2019-11-08 | Stop reason: SDUPTHER

## 2019-06-06 RX ORDER — LOSARTAN POTASSIUM 100 MG/1
TABLET ORAL
Qty: 90 TABLET | Refills: 3 | Status: SHIPPED | OUTPATIENT
Start: 2019-06-06 | End: 2020-05-29

## 2019-09-13 ENCOUNTER — LAB VISIT (OUTPATIENT)
Dept: LAB | Facility: OTHER | Age: 79
End: 2019-09-13
Attending: UROLOGY
Payer: MEDICARE

## 2019-09-13 DIAGNOSIS — C61 PROSTATE CANCER: ICD-10-CM

## 2019-09-13 LAB — COMPLEXED PSA SERPL-MCNC: 0.35 NG/ML (ref 0–4)

## 2019-09-13 PROCEDURE — 84153 ASSAY OF PSA TOTAL: CPT

## 2019-09-13 PROCEDURE — 36415 COLL VENOUS BLD VENIPUNCTURE: CPT

## 2019-09-19 ENCOUNTER — PATIENT OUTREACH (OUTPATIENT)
Dept: ADMINISTRATIVE | Facility: OTHER | Age: 79
End: 2019-09-19

## 2019-09-24 ENCOUNTER — OFFICE VISIT (OUTPATIENT)
Dept: UROLOGY | Facility: CLINIC | Age: 79
End: 2019-09-24
Attending: UROLOGY
Payer: MEDICARE

## 2019-09-24 VITALS
WEIGHT: 241.38 LBS | SYSTOLIC BLOOD PRESSURE: 180 MMHG | DIASTOLIC BLOOD PRESSURE: 78 MMHG | BODY MASS INDEX: 30.98 KG/M2 | HEART RATE: 70 BPM | HEIGHT: 74 IN

## 2019-09-24 DIAGNOSIS — C61 PROSTATE CANCER: Primary | ICD-10-CM

## 2019-09-24 DIAGNOSIS — R97.20 ELEVATED PROSTATE SPECIFIC ANTIGEN (PSA): ICD-10-CM

## 2019-09-24 PROCEDURE — 99213 OFFICE O/P EST LOW 20 MIN: CPT | Mod: S$GLB,,, | Performed by: UROLOGY

## 2019-09-24 PROCEDURE — 1101F PR PT FALLS ASSESS DOC 0-1 FALLS W/OUT INJ PAST YR: ICD-10-PCS | Mod: CPTII,S$GLB,, | Performed by: UROLOGY

## 2019-09-24 PROCEDURE — 99213 PR OFFICE/OUTPT VISIT, EST, LEVL III, 20-29 MIN: ICD-10-PCS | Mod: S$GLB,,, | Performed by: UROLOGY

## 2019-09-24 PROCEDURE — 3078F PR MOST RECENT DIASTOLIC BLOOD PRESSURE < 80 MM HG: ICD-10-PCS | Mod: CPTII,S$GLB,, | Performed by: UROLOGY

## 2019-09-24 PROCEDURE — 1101F PT FALLS ASSESS-DOCD LE1/YR: CPT | Mod: CPTII,S$GLB,, | Performed by: UROLOGY

## 2019-09-24 PROCEDURE — 3078F DIAST BP <80 MM HG: CPT | Mod: CPTII,S$GLB,, | Performed by: UROLOGY

## 2019-09-24 PROCEDURE — 3077F SYST BP >= 140 MM HG: CPT | Mod: CPTII,S$GLB,, | Performed by: UROLOGY

## 2019-09-24 PROCEDURE — 3077F PR MOST RECENT SYSTOLIC BLOOD PRESSURE >= 140 MM HG: ICD-10-PCS | Mod: CPTII,S$GLB,, | Performed by: UROLOGY

## 2019-09-24 NOTE — PROGRESS NOTES
"Subjective:      Tip Hurst is a 78 y.o. male who returns today regarding his prostate cancer.    He is here for routine FU with PSA. No concerns today.    He has history of prostate cancer s/p prostatectomy in 2006. No known recurrence. No adjuvant treatments reported. Path unknown.  He has had biochemical recurrence per PSA testing beginning in 2010 with a very slow rise in PSA in the interim.      In 2018 he had incidental finding of a possible bladder lesion on ultrasound.  Cystoscopy was normal at that time.    The following portions of the patient's history were reviewed and updated as appropriate: allergies, current medications, past family history, past medical history, past social history, past surgical history and problem list.    Review of Systems  A comprehensive multipoint review of systems was negative except as otherwise stated in the HPI.     Objective:   Vitals: BP (!) 180/78 (BP Location: Left arm, Patient Position: Sitting, BP Method: Large (Automatic))   Pulse 70   Ht 6' 2" (1.88 m)   Wt 109.5 kg (241 lb 6.5 oz)   BMI 30.99 kg/m²     Physical Exam   General: alert and oriented, no acute distress  Respiratory: Symmetric expansion, non-labored breathing  Neuro: no gross deficits  Psych: normal judgment and insight, normal mood/affect and non-anxious    Lab Review     Lab Results   Component Value Date    WBC 7.23 06/19/2018    HGB 11.8 (L) 06/19/2018    HCT 36.6 (L) 06/19/2018    MCV 89 06/19/2018     06/19/2018     Lab Results   Component Value Date    CREATININE 1.7 (H) 06/22/2018    BUN 26 (H) 06/22/2018     Component PSA, SCREEN PSA DIAGNOSTIC   Latest Ref Rng & Units 0.00 - 4.00 ng/mL 0.00 - 4.00 ng/mL   9/13/2019  0.35   3/14/2019  0.31   8/21/2018  0.25   9/5/2013 0.26    7/23/2013 0.25    8/21/2012 0.18    5/4/2011 0.03    9/21/2010 0.02    9/25/2009 <0.01    8/6/2008 <0.01    8/9/2007 <0.1    7/13/2006 6.6 (H)    4/19/2005 4.2 (H)        Assessment and Plan:   1. Prostate " cancer  -- Biochemical recurrence with very slow rise in PSA over the last 10 years.  -- Again, no need for additional treatment such as ADT at this time (or hopefully ever)  -- Follow-up 6 months with PSA

## 2019-10-08 ENCOUNTER — TELEPHONE (OUTPATIENT)
Dept: INTERNAL MEDICINE | Facility: CLINIC | Age: 79
End: 2019-10-08

## 2019-10-08 NOTE — TELEPHONE ENCOUNTER
----- Message from Ravin Aj, Patient Care Assistant sent at 10/8/2019 12:03 PM CDT -----  Contact: ESTEFANIA ROBISON [[id number]]  Name of Who is Calling: ESTEFANIA ROBISON [[id number]]    What is the request in detail:Patient is requesting a replacement medication for losartan (COZAAR) 100 MG tablet. Patient states medication was discontinued. Please contact to further discuss and advise      Can the clinic reply by [facility]: No    What [facility] to [facility] Back [facility]:  [phone]

## 2019-10-08 NOTE — TELEPHONE ENCOUNTER
Please confirm that pt was contact by his pharmacy stating his lot was recalled. If not he need to contact pharmacy as step #1 to see if his meds were involved. This is protocol for all future calls like this.

## 2019-10-09 NOTE — TELEPHONE ENCOUNTER
Spoke to Mr. Hurst.  Patient states that his medication is recalled.  Instructed patient to call his pharmacy to see if his lot number has been recalled.  Patient states understanding and states that he will call the office once he speak to his pharmacy.

## 2019-11-08 DIAGNOSIS — M10.071 IDIOPATHIC GOUT OF RIGHT FOOT, UNSPECIFIED CHRONICITY: ICD-10-CM

## 2019-11-08 DIAGNOSIS — I10 HTN (HYPERTENSION), BENIGN: ICD-10-CM

## 2019-11-08 DIAGNOSIS — E78.00 HYPERCHOLESTEROLEMIA: Primary | ICD-10-CM

## 2019-11-08 RX ORDER — AMLODIPINE BESYLATE 5 MG/1
5 TABLET ORAL DAILY
Qty: 90 TABLET | Refills: 0 | Status: SHIPPED | OUTPATIENT
Start: 2019-11-08 | End: 2020-01-06 | Stop reason: SDUPTHER

## 2019-11-08 RX ORDER — SIMVASTATIN 40 MG/1
40 TABLET, FILM COATED ORAL NIGHTLY
Qty: 90 TABLET | Refills: 0 | Status: SHIPPED | OUTPATIENT
Start: 2019-11-08 | End: 2020-02-26

## 2019-11-08 RX ORDER — ALLOPURINOL 100 MG/1
100 TABLET ORAL DAILY
Qty: 90 TABLET | Refills: 0 | Status: SHIPPED | OUTPATIENT
Start: 2019-11-08 | End: 2020-07-20

## 2020-01-06 ENCOUNTER — LAB VISIT (OUTPATIENT)
Dept: LAB | Facility: OTHER | Age: 80
End: 2020-01-06
Attending: INTERNAL MEDICINE
Payer: MEDICARE

## 2020-01-06 ENCOUNTER — TELEPHONE (OUTPATIENT)
Dept: INTERNAL MEDICINE | Facility: CLINIC | Age: 80
End: 2020-01-06

## 2020-01-06 ENCOUNTER — OFFICE VISIT (OUTPATIENT)
Dept: INTERNAL MEDICINE | Facility: CLINIC | Age: 80
End: 2020-01-06
Attending: INTERNAL MEDICINE
Payer: MEDICARE

## 2020-01-06 VITALS
BODY MASS INDEX: 31.63 KG/M2 | HEIGHT: 74 IN | SYSTOLIC BLOOD PRESSURE: 186 MMHG | WEIGHT: 246.5 LBS | DIASTOLIC BLOOD PRESSURE: 79 MMHG

## 2020-01-06 DIAGNOSIS — N32.9 LESION OF BLADDER: ICD-10-CM

## 2020-01-06 DIAGNOSIS — R73.03 PRE-DIABETES: ICD-10-CM

## 2020-01-06 DIAGNOSIS — E78.00 HYPERCHOLESTEROLEMIA: ICD-10-CM

## 2020-01-06 DIAGNOSIS — R77.8 ELEVATED TOTAL PROTEIN: ICD-10-CM

## 2020-01-06 DIAGNOSIS — C61 PROSTATE CANCER: Primary | ICD-10-CM

## 2020-01-06 DIAGNOSIS — N18.30 CKD (CHRONIC KIDNEY DISEASE) STAGE 3, GFR 30-59 ML/MIN: Primary | ICD-10-CM

## 2020-01-06 DIAGNOSIS — Z12.5 ENCOUNTER FOR SCREENING FOR MALIGNANT NEOPLASM OF PROSTATE: ICD-10-CM

## 2020-01-06 DIAGNOSIS — Z11.4 ENCOUNTER FOR SCREENING FOR HUMAN IMMUNODEFICIENCY VIRUS (HIV): ICD-10-CM

## 2020-01-06 DIAGNOSIS — M10.9 GOUT, ARTHRITIS: ICD-10-CM

## 2020-01-06 DIAGNOSIS — C61 PROSTATE CANCER: ICD-10-CM

## 2020-01-06 DIAGNOSIS — R74.8 ELEVATED ALKALINE PHOSPHATASE LEVEL: ICD-10-CM

## 2020-01-06 DIAGNOSIS — N18.30 CHRONIC KIDNEY DISEASE, STAGE 3 (MODERATE): ICD-10-CM

## 2020-01-06 DIAGNOSIS — I10 HTN (HYPERTENSION), BENIGN: ICD-10-CM

## 2020-01-06 LAB
ALBUMIN SERPL BCP-MCNC: 3.8 G/DL (ref 3.5–5.2)
ALP SERPL-CCNC: 148 U/L (ref 55–135)
ALT SERPL W/O P-5'-P-CCNC: 51 U/L (ref 10–44)
ANION GAP SERPL CALC-SCNC: 10 MMOL/L (ref 8–16)
AST SERPL-CCNC: 34 U/L (ref 10–40)
BASOPHILS # BLD AUTO: 0.06 K/UL (ref 0–0.2)
BASOPHILS NFR BLD: 0.9 % (ref 0–1.9)
BILIRUB SERPL-MCNC: 0.5 MG/DL (ref 0.1–1)
BUN SERPL-MCNC: 17 MG/DL (ref 8–23)
CALCIUM SERPL-MCNC: 9.9 MG/DL (ref 8.7–10.5)
CHLORIDE SERPL-SCNC: 105 MMOL/L (ref 95–110)
CHOLEST SERPL-MCNC: 145 MG/DL (ref 120–199)
CHOLEST/HDLC SERPL: 4.4 {RATIO} (ref 2–5)
CO2 SERPL-SCNC: 23 MMOL/L (ref 23–29)
CREAT SERPL-MCNC: 1.6 MG/DL (ref 0.5–1.4)
DIFFERENTIAL METHOD: ABNORMAL
EOSINOPHIL # BLD AUTO: 0.4 K/UL (ref 0–0.5)
EOSINOPHIL NFR BLD: 5.5 % (ref 0–8)
ERYTHROCYTE [DISTWIDTH] IN BLOOD BY AUTOMATED COUNT: 12.9 % (ref 11.5–14.5)
EST. GFR  (AFRICAN AMERICAN): 46 ML/MIN/1.73 M^2
EST. GFR  (NON AFRICAN AMERICAN): 40 ML/MIN/1.73 M^2
ESTIMATED AVG GLUCOSE: 140 MG/DL (ref 68–131)
GLUCOSE SERPL-MCNC: 123 MG/DL (ref 70–110)
HBA1C MFR BLD HPLC: 6.5 % (ref 4–5.6)
HCT VFR BLD AUTO: 40.8 % (ref 40–54)
HDLC SERPL-MCNC: 33 MG/DL (ref 40–75)
HDLC SERPL: 22.8 % (ref 20–50)
HGB BLD-MCNC: 12.5 G/DL (ref 14–18)
IMM GRANULOCYTES # BLD AUTO: 0.02 K/UL (ref 0–0.04)
IMM GRANULOCYTES NFR BLD AUTO: 0.3 % (ref 0–0.5)
LDLC SERPL CALC-MCNC: 94 MG/DL (ref 63–159)
LYMPHOCYTES # BLD AUTO: 2.8 K/UL (ref 1–4.8)
LYMPHOCYTES NFR BLD: 42.4 % (ref 18–48)
MCH RBC QN AUTO: 27.5 PG (ref 27–31)
MCHC RBC AUTO-ENTMCNC: 30.6 G/DL (ref 32–36)
MCV RBC AUTO: 90 FL (ref 82–98)
MONOCYTES # BLD AUTO: 0.7 K/UL (ref 0.3–1)
MONOCYTES NFR BLD: 11.2 % (ref 4–15)
NEUTROPHILS # BLD AUTO: 2.6 K/UL (ref 1.8–7.7)
NEUTROPHILS NFR BLD: 39.7 % (ref 38–73)
NONHDLC SERPL-MCNC: 112 MG/DL
NRBC BLD-RTO: 0 /100 WBC
PLATELET # BLD AUTO: 367 K/UL (ref 150–350)
PMV BLD AUTO: 9.8 FL (ref 9.2–12.9)
POTASSIUM SERPL-SCNC: 4.9 MMOL/L (ref 3.5–5.1)
PROT SERPL-MCNC: 8.7 G/DL (ref 6–8.4)
RBC # BLD AUTO: 4.54 M/UL (ref 4.6–6.2)
SODIUM SERPL-SCNC: 138 MMOL/L (ref 136–145)
TRIGL SERPL-MCNC: 90 MG/DL (ref 30–150)
TSH SERPL DL<=0.005 MIU/L-ACNC: 3.27 UIU/ML (ref 0.4–4)
URATE SERPL-MCNC: 6.3 MG/DL (ref 3.4–7)
WBC # BLD AUTO: 6.49 K/UL (ref 3.9–12.7)

## 2020-01-06 PROCEDURE — 99499 RISK ADDL DX/OHS AUDIT: ICD-10-PCS | Mod: S$GLB,,, | Performed by: INTERNAL MEDICINE

## 2020-01-06 PROCEDURE — 85025 COMPLETE CBC W/AUTO DIFF WBC: CPT

## 2020-01-06 PROCEDURE — 3077F SYST BP >= 140 MM HG: CPT | Mod: CPTII,S$GLB,, | Performed by: INTERNAL MEDICINE

## 2020-01-06 PROCEDURE — 99999 PR PBB SHADOW E&M-EST. PATIENT-LVL III: ICD-10-PCS | Mod: PBBFAC,,, | Performed by: INTERNAL MEDICINE

## 2020-01-06 PROCEDURE — 3078F DIAST BP <80 MM HG: CPT | Mod: CPTII,S$GLB,, | Performed by: INTERNAL MEDICINE

## 2020-01-06 PROCEDURE — 3077F PR MOST RECENT SYSTOLIC BLOOD PRESSURE >= 140 MM HG: ICD-10-PCS | Mod: CPTII,S$GLB,, | Performed by: INTERNAL MEDICINE

## 2020-01-06 PROCEDURE — 1126F AMNT PAIN NOTED NONE PRSNT: CPT | Mod: S$GLB,,, | Performed by: INTERNAL MEDICINE

## 2020-01-06 PROCEDURE — 99999 PR PBB SHADOW E&M-EST. PATIENT-LVL III: CPT | Mod: PBBFAC,,, | Performed by: INTERNAL MEDICINE

## 2020-01-06 PROCEDURE — 1126F PR PAIN SEVERITY QUANTIFIED, NO PAIN PRESENT: ICD-10-PCS | Mod: S$GLB,,, | Performed by: INTERNAL MEDICINE

## 2020-01-06 PROCEDURE — 80061 LIPID PANEL: CPT

## 2020-01-06 PROCEDURE — 1159F MED LIST DOCD IN RCRD: CPT | Mod: S$GLB,,, | Performed by: INTERNAL MEDICINE

## 2020-01-06 PROCEDURE — 99214 PR OFFICE/OUTPT VISIT, EST, LEVL IV, 30-39 MIN: ICD-10-PCS | Mod: S$GLB,,, | Performed by: INTERNAL MEDICINE

## 2020-01-06 PROCEDURE — 84550 ASSAY OF BLOOD/URIC ACID: CPT

## 2020-01-06 PROCEDURE — 99214 OFFICE O/P EST MOD 30 MIN: CPT | Mod: S$GLB,,, | Performed by: INTERNAL MEDICINE

## 2020-01-06 PROCEDURE — 1101F PR PT FALLS ASSESS DOC 0-1 FALLS W/OUT INJ PAST YR: ICD-10-PCS | Mod: CPTII,S$GLB,, | Performed by: INTERNAL MEDICINE

## 2020-01-06 PROCEDURE — 1159F PR MEDICATION LIST DOCUMENTED IN MEDICAL RECORD: ICD-10-PCS | Mod: S$GLB,,, | Performed by: INTERNAL MEDICINE

## 2020-01-06 PROCEDURE — 80053 COMPREHEN METABOLIC PANEL: CPT

## 2020-01-06 PROCEDURE — 99499 UNLISTED E&M SERVICE: CPT | Mod: S$GLB,,, | Performed by: INTERNAL MEDICINE

## 2020-01-06 PROCEDURE — 1101F PT FALLS ASSESS-DOCD LE1/YR: CPT | Mod: CPTII,S$GLB,, | Performed by: INTERNAL MEDICINE

## 2020-01-06 PROCEDURE — 84443 ASSAY THYROID STIM HORMONE: CPT

## 2020-01-06 PROCEDURE — 83036 HEMOGLOBIN GLYCOSYLATED A1C: CPT

## 2020-01-06 PROCEDURE — 3078F PR MOST RECENT DIASTOLIC BLOOD PRESSURE < 80 MM HG: ICD-10-PCS | Mod: CPTII,S$GLB,, | Performed by: INTERNAL MEDICINE

## 2020-01-06 PROCEDURE — 36415 COLL VENOUS BLD VENIPUNCTURE: CPT

## 2020-01-06 RX ORDER — AMLODIPINE BESYLATE 10 MG/1
10 TABLET ORAL DAILY
Qty: 90 TABLET | Refills: 2 | Status: SHIPPED | OUTPATIENT
Start: 2020-01-06 | End: 2020-01-15

## 2020-01-06 NOTE — TELEPHONE ENCOUNTER
Spoke to Mr. Hurst and informed him that overall labs are okay but a few abnormal results require additional labs. Let's get additional studies and f/u in clinic 1 week after getting these done and we will review results and discuss plan. Patient confirmed date and time of appt.  Instructed to call the office for any further questions or concerns.

## 2020-01-06 NOTE — PROGRESS NOTES
"Subjective:       Patient ID: Tip Hurst is a 79 y.o. male.    Chief Complaint: Follow-up    Here for annual exam    Bilateral knee pain and aching of hips once a month.  He was taking p.r.n. 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  This due to his CKD history.  He reports some mild as, minimal swelling of the knee, and denies sensation of giving out or falls related pains.  Tried physical therapy in the past with worsened pain. Not take anything on a daily basis.  It does make it difficult for him to use regular bus Services.    He has no other complaints or concerns today his BP remains elevated.  Low-salt diet is questionable.  He is adherent with amlodipine 5 mg and losartan 100 mg. Denies frequent or current HA, intermittent blurry vision, dizziness, CP, or SOB.            Review of Systems   Constitutional: Negative for appetite change, chills, fever and unexpected weight change.   HENT: Negative for hearing loss, sore throat and trouble swallowing.    Eyes: Negative for visual disturbance.   Respiratory: Negative for cough, chest tightness and shortness of breath.    Cardiovascular: Negative for chest pain and leg swelling.   Gastrointestinal: Negative for abdominal pain, blood in stool, constipation, diarrhea, nausea and vomiting.   Endocrine: Negative for polydipsia and polyuria.   Genitourinary: Negative for decreased urine volume, difficulty urinating, dysuria, frequency and urgency.   Musculoskeletal: Negative for gait problem.   Skin: Negative for rash.   Neurological: Negative for dizziness and numbness.   Psychiatric/Behavioral: The patient is not nervous/anxious.        Objective:      Vitals:    01/06/20 0840   BP: (!) 186/79   Pulse: (P) 77   SpO2: (P) 98%   Weight: 111.8 kg (246 lb 7.6 oz)   Height: 6' 2" (1.88 m)      Physical Exam   Constitutional: He is oriented to person, place, and time. He appears well-developed and well-nourished. No distress.   HENT:   Head: Normocephalic and atraumatic. "   Mouth/Throat: Oropharynx is clear and moist. No oropharyngeal exudate.   Eyes: Pupils are equal, round, and reactive to light. Conjunctivae and EOM are normal. No scleral icterus.   Neck: No thyromegaly present.   Cardiovascular: Normal rate, regular rhythm and normal heart sounds.   No murmur heard.  Pulmonary/Chest: Effort normal and breath sounds normal. He has no wheezes. He has no rales.   Abdominal: Soft. He exhibits no distension. There is no tenderness.   Musculoskeletal: He exhibits no edema or tenderness.   Lymphadenopathy:     He has no cervical adenopathy.   Neurological: He is alert and oriented to person, place, and time.   Skin: Skin is warm and dry.   Psychiatric: He has a normal mood and affect. His behavior is normal.       Assessment:       1. CKD (chronic kidney disease) stage 3, GFR 30-59 ml/min    2. HTN (hypertension), benign    3. Hypercholesterolemia    4. Pre-diabetes    5. Gout, arthritis    6. Lesion of bladder    7. Prostate cancer        Plan:       Tip was seen today for follow-up.    Diagnoses and all orders for this visit:    CKD (chronic kidney disease) stage 3, GFR 30-59 ml/min  Needs better BP control. It is important to take all prescribed medications on a consitent basis to ensure control of chronic medical conditions such as high blood pressure and diabetes.  If these conditions are left uncotrolled they can further damage the kidneys. It is also important for you to avoid over the counter non-steroidal anti-inflammatory (aka NSAIDS) pain medications such as ibuprofen, naproxen, naprosyn, Advil, Motrin, Aleve, Goody's powder.    HTN (hypertension), benign  Uncontrolled, asymptomatic.   Increase amlodipine and f/u in 2 weeks for nurse visit for BP check  -     Comprehensive metabolic panel; Future  -     Lipid panel; Future  -     TSH; Future  -     CBC auto differential; Future  -     amLODIPine (NORVASC) 10 MG tablet; Take 1 tablet (10 mg total) by mouth once  daily.    Hypercholesterolemia  -     Lipid panel; Future    Pre-diabetes  -     Hemoglobin A1c; Future    Gout, arthritis  -     Uric acid; Future    Lesion of bladder    Prostate cancer   He is being closely monitored by Dr West       RTC in 6 months or sooner elenita Contreras MD  Internal Medicine-[facility]        Side effects of medication(s) were discussed in detail and patient voiced understanding.  Patient will call back for any issues or complications.

## 2020-01-06 NOTE — TELEPHONE ENCOUNTER
Please let pt know overall labs are okay but a few abnormal results require additional labs. Let's get additional studies and f/u in clinic 1 week after getting these done and we will review results and discuss plan.

## 2020-01-07 ENCOUNTER — IMMUNIZATION (OUTPATIENT)
Dept: PHARMACY | Facility: CLINIC | Age: 80
End: 2020-01-07
Payer: MEDICARE

## 2020-01-15 ENCOUNTER — TELEPHONE (OUTPATIENT)
Dept: INTERNAL MEDICINE | Facility: CLINIC | Age: 80
End: 2020-01-15

## 2020-01-15 RX ORDER — NIFEDIPINE 60 MG/1
60 TABLET, EXTENDED RELEASE ORAL DAILY
Qty: 90 TABLET | Refills: 1 | Status: SHIPPED | OUTPATIENT
Start: 2020-01-15 | End: 2020-07-16

## 2020-01-15 NOTE — TELEPHONE ENCOUNTER
Spoke to Mr. Hurst and informed  himNifedipine sent in to replace amlodipine. Also please scheduled ordered labs and schedule f/u in clinic for 1 week after labs done;  Patient confirmed date and time of appt.

## 2020-01-15 NOTE — TELEPHONE ENCOUNTER
----- Message from Marj Ritchie sent at 1/15/2020  8:21 AM CST -----  Contact: Self   Type: Patient Call Back    Who called: Self     What is the request in detail:patient would like to speak with the nurse who called him about doing labs . He also States his BP medication is to expensive. Please call   amLODIPine (NORVASC) 10 MG tablet    Can the clinic reply by [facility]? No     Would the patient rather a call back or a response via [facility]?  Call     Best call back number:[phone]

## 2020-01-16 ENCOUNTER — LAB VISIT (OUTPATIENT)
Dept: LAB | Facility: OTHER | Age: 80
End: 2020-01-16
Attending: INTERNAL MEDICINE
Payer: MEDICARE

## 2020-01-16 DIAGNOSIS — R77.8 ELEVATED TOTAL PROTEIN: ICD-10-CM

## 2020-01-16 DIAGNOSIS — C61 PROSTATE CANCER: ICD-10-CM

## 2020-01-16 DIAGNOSIS — Z12.5 ENCOUNTER FOR SCREENING FOR MALIGNANT NEOPLASM OF PROSTATE: ICD-10-CM

## 2020-01-16 DIAGNOSIS — N18.30 CHRONIC KIDNEY DISEASE, STAGE 3 (MODERATE): ICD-10-CM

## 2020-01-16 DIAGNOSIS — Z11.4 ENCOUNTER FOR SCREENING FOR HUMAN IMMUNODEFICIENCY VIRUS (HIV): ICD-10-CM

## 2020-01-16 DIAGNOSIS — R74.8 ELEVATED ALKALINE PHOSPHATASE LEVEL: ICD-10-CM

## 2020-01-16 LAB
25(OH)D3+25(OH)D2 SERPL-MCNC: 12 NG/ML (ref 30–96)
ALBUMIN SERPL BCP-MCNC: 4 G/DL (ref 3.5–5.2)
ALP SERPL-CCNC: 153 U/L (ref 55–135)
ALT SERPL W/O P-5'-P-CCNC: 38 U/L (ref 10–44)
ANION GAP SERPL CALC-SCNC: 12 MMOL/L (ref 8–16)
AST SERPL-CCNC: 27 U/L (ref 10–40)
BILIRUB SERPL-MCNC: 0.7 MG/DL (ref 0.1–1)
BUN SERPL-MCNC: 21 MG/DL (ref 8–23)
CALCIUM SERPL-MCNC: 10.3 MG/DL (ref 8.7–10.5)
CHLORIDE SERPL-SCNC: 106 MMOL/L (ref 95–110)
CO2 SERPL-SCNC: 22 MMOL/L (ref 23–29)
COMPLEXED PSA SERPL-MCNC: 0.41 NG/ML (ref 0–4)
CREAT SERPL-MCNC: 1.6 MG/DL (ref 0.5–1.4)
EST. GFR  (AFRICAN AMERICAN): 46 ML/MIN/1.73 M^2
EST. GFR  (NON AFRICAN AMERICAN): 40 ML/MIN/1.73 M^2
GLUCOSE SERPL-MCNC: 131 MG/DL (ref 70–110)
POTASSIUM SERPL-SCNC: 4.8 MMOL/L (ref 3.5–5.1)
PROT SERPL-MCNC: 8.8 G/DL (ref 6–8.4)
SODIUM SERPL-SCNC: 140 MMOL/L (ref 136–145)

## 2020-01-16 PROCEDURE — 84153 ASSAY OF PSA TOTAL: CPT

## 2020-01-16 PROCEDURE — 84165 PROTEIN E-PHORESIS SERUM: CPT

## 2020-01-16 PROCEDURE — 86703 HIV-1/HIV-2 1 RESULT ANTBDY: CPT

## 2020-01-16 PROCEDURE — 80053 COMPREHEN METABOLIC PANEL: CPT

## 2020-01-16 PROCEDURE — 86334 PATHOLOGIST INTERPRETATION IFE: ICD-10-PCS | Mod: 26,,, | Performed by: PATHOLOGY

## 2020-01-16 PROCEDURE — 87340 HEPATITIS B SURFACE AG IA: CPT

## 2020-01-16 PROCEDURE — 36415 COLL VENOUS BLD VENIPUNCTURE: CPT

## 2020-01-16 PROCEDURE — 86334 IMMUNOFIX E-PHORESIS SERUM: CPT

## 2020-01-16 PROCEDURE — 84165 PATHOLOGIST INTERPRETATION SPE: ICD-10-PCS | Mod: 26,,, | Performed by: PATHOLOGY

## 2020-01-16 PROCEDURE — 84165 PROTEIN E-PHORESIS SERUM: CPT | Mod: 26,,, | Performed by: PATHOLOGY

## 2020-01-16 PROCEDURE — 86803 HEPATITIS C AB TEST: CPT

## 2020-01-16 PROCEDURE — 86334 IMMUNOFIX E-PHORESIS SERUM: CPT | Mod: 26,,, | Performed by: PATHOLOGY

## 2020-01-16 PROCEDURE — 82306 VITAMIN D 25 HYDROXY: CPT

## 2020-01-17 ENCOUNTER — LAB VISIT (OUTPATIENT)
Dept: LAB | Facility: OTHER | Age: 80
End: 2020-01-17
Attending: INTERNAL MEDICINE
Payer: MEDICARE

## 2020-01-17 DIAGNOSIS — R77.8 ELEVATED TOTAL PROTEIN: ICD-10-CM

## 2020-01-17 LAB
ALBUMIN SERPL ELPH-MCNC: 4.03 G/DL (ref 3.35–5.55)
ALPHA1 GLOB SERPL ELPH-MCNC: 0.32 G/DL (ref 0.17–0.41)
ALPHA2 GLOB SERPL ELPH-MCNC: 1.07 G/DL (ref 0.43–0.99)
B-GLOBULIN SERPL ELPH-MCNC: 0.96 G/DL (ref 0.5–1.1)
GAMMA GLOB SERPL ELPH-MCNC: 1.92 G/DL (ref 0.67–1.58)
HBV SURFACE AG SERPL QL IA: NEGATIVE
HCV AB SERPL QL IA: NEGATIVE
HIV 1+2 AB+HIV1 P24 AG SERPL QL IA: NEGATIVE
PROT 24H UR-MRATE: 936 MG/SPEC (ref 0–100)
PROT SERPL-MCNC: 8.3 G/DL (ref 6–8.4)
PROT UR-MCNC: 72 MG/DL (ref 0–15)
URINE COLLECTION DURATION: 24 HR
URINE VOLUME: 1300 ML

## 2020-01-17 PROCEDURE — 84166 PROTEIN E-PHORESIS/URINE/CSF: CPT

## 2020-01-17 PROCEDURE — 84166 PROTEIN E-PHORESIS/URINE/CSF: CPT | Mod: 26,,, | Performed by: PATHOLOGY

## 2020-01-17 PROCEDURE — 84166 PATHOLOGIST INTERPRETATION UPE: ICD-10-PCS | Mod: 26,,, | Performed by: PATHOLOGY

## 2020-01-17 PROCEDURE — 84156 ASSAY OF PROTEIN URINE: CPT

## 2020-01-20 LAB
INTERPRETATION SERPL IFE-IMP: NORMAL
PROT PATTERN UR ELPH-IMP: NORMAL

## 2020-01-21 ENCOUNTER — OFFICE VISIT (OUTPATIENT)
Dept: INTERNAL MEDICINE | Facility: CLINIC | Age: 80
End: 2020-01-21
Attending: INTERNAL MEDICINE
Payer: MEDICARE

## 2020-01-21 VITALS
WEIGHT: 246.69 LBS | HEIGHT: 74 IN | BODY MASS INDEX: 31.66 KG/M2 | SYSTOLIC BLOOD PRESSURE: 144 MMHG | HEART RATE: 81 BPM | OXYGEN SATURATION: 98 % | DIASTOLIC BLOOD PRESSURE: 56 MMHG

## 2020-01-21 DIAGNOSIS — D64.9 ANEMIA, UNSPECIFIED TYPE: ICD-10-CM

## 2020-01-21 DIAGNOSIS — R20.0 ANESTHESIA OF SKIN: ICD-10-CM

## 2020-01-21 DIAGNOSIS — C61 RECURRENT PROSTATE CANCER: ICD-10-CM

## 2020-01-21 DIAGNOSIS — R74.8 ELEVATED ALKALINE PHOSPHATASE LEVEL: ICD-10-CM

## 2020-01-21 DIAGNOSIS — R77.8 ELEVATED TOTAL PROTEIN: Primary | ICD-10-CM

## 2020-01-21 DIAGNOSIS — E55.9 VITAMIN D DEFICIENCY: ICD-10-CM

## 2020-01-21 DIAGNOSIS — N18.30 CKD (CHRONIC KIDNEY DISEASE) STAGE 3, GFR 30-59 ML/MIN: ICD-10-CM

## 2020-01-21 LAB — PATHOLOGIST INTERPRETATION UPE: NORMAL

## 2020-01-21 PROCEDURE — 3077F PR MOST RECENT SYSTOLIC BLOOD PRESSURE >= 140 MM HG: ICD-10-PCS | Mod: CPTII,S$GLB,, | Performed by: INTERNAL MEDICINE

## 2020-01-21 PROCEDURE — 1101F PT FALLS ASSESS-DOCD LE1/YR: CPT | Mod: CPTII,S$GLB,, | Performed by: INTERNAL MEDICINE

## 2020-01-21 PROCEDURE — 99214 PR OFFICE/OUTPT VISIT, EST, LEVL IV, 30-39 MIN: ICD-10-PCS | Mod: S$GLB,,, | Performed by: INTERNAL MEDICINE

## 2020-01-21 PROCEDURE — 99214 OFFICE O/P EST MOD 30 MIN: CPT | Mod: S$GLB,,, | Performed by: INTERNAL MEDICINE

## 2020-01-21 PROCEDURE — 99499 RISK ADDL DX/OHS AUDIT: ICD-10-PCS | Mod: S$GLB,,, | Performed by: INTERNAL MEDICINE

## 2020-01-21 PROCEDURE — 1159F MED LIST DOCD IN RCRD: CPT | Mod: S$GLB,,, | Performed by: INTERNAL MEDICINE

## 2020-01-21 PROCEDURE — 3077F SYST BP >= 140 MM HG: CPT | Mod: CPTII,S$GLB,, | Performed by: INTERNAL MEDICINE

## 2020-01-21 PROCEDURE — 1126F PR PAIN SEVERITY QUANTIFIED, NO PAIN PRESENT: ICD-10-PCS | Mod: S$GLB,,, | Performed by: INTERNAL MEDICINE

## 2020-01-21 PROCEDURE — 1101F PR PT FALLS ASSESS DOC 0-1 FALLS W/OUT INJ PAST YR: ICD-10-PCS | Mod: CPTII,S$GLB,, | Performed by: INTERNAL MEDICINE

## 2020-01-21 PROCEDURE — 3078F PR MOST RECENT DIASTOLIC BLOOD PRESSURE < 80 MM HG: ICD-10-PCS | Mod: CPTII,S$GLB,, | Performed by: INTERNAL MEDICINE

## 2020-01-21 PROCEDURE — 1126F AMNT PAIN NOTED NONE PRSNT: CPT | Mod: S$GLB,,, | Performed by: INTERNAL MEDICINE

## 2020-01-21 PROCEDURE — 99999 PR PBB SHADOW E&M-EST. PATIENT-LVL IV: ICD-10-PCS | Mod: PBBFAC,,, | Performed by: INTERNAL MEDICINE

## 2020-01-21 PROCEDURE — 99999 PR PBB SHADOW E&M-EST. PATIENT-LVL IV: CPT | Mod: PBBFAC,,, | Performed by: INTERNAL MEDICINE

## 2020-01-21 PROCEDURE — 99499 UNLISTED E&M SERVICE: CPT | Mod: S$GLB,,, | Performed by: INTERNAL MEDICINE

## 2020-01-21 PROCEDURE — 3078F DIAST BP <80 MM HG: CPT | Mod: CPTII,S$GLB,, | Performed by: INTERNAL MEDICINE

## 2020-01-21 PROCEDURE — 1159F PR MEDICATION LIST DOCUMENTED IN MEDICAL RECORD: ICD-10-PCS | Mod: S$GLB,,, | Performed by: INTERNAL MEDICINE

## 2020-01-21 RX ORDER — ASPIRIN 325 MG
50000 TABLET, DELAYED RELEASE (ENTERIC COATED) ORAL WEEKLY
Qty: 12 CAPSULE | Refills: 3 | Status: SHIPPED | OUTPATIENT
Start: 2020-01-21 | End: 2020-10-21

## 2020-01-21 NOTE — PROGRESS NOTES
Subjective:       Patient ID: Tip Hurst is a 79 y.o. male.    Chief Complaint: Follow-up    Here for f/u    78 yo M with PMHx of prostate CA, HTN, HLD, GERD, OA knees, Gout returns to clinic at my request to discuss multiple lab abnormalities.    We went through each problem discussed differential in likelihood for rest recent lab abnormalities including mildly elevated total protein, chronic anemia 13 years, vitamin-D deficiency, fairly new my normal elevation of his alkaline phosphatase, his mild rise of his PSA level over the past several years in the setting of a history of prostate cancer status post treatment and his chronic kidney disease.    Total Protein  \This was seen moment early just out of range at 8.6 as far back as 2005 2006 and then most recently just this January minimal elevation of 8.7 and 8.8.  This degree of elevation while may be dehydration in the setting of CKD, prostate history, anemia this warrants additional investigation.  His SPEP would be most consistent with an elevated gamma globulin as the health increases minimal.  Will need chain analysis and heme eval.            Follow-up   Pertinent negatives include no abdominal pain, chest pain, chills, coughing, fever, nausea, numbness, rash, sore throat or vomiting.       Review of Systems   Constitutional: Negative for appetite change, chills, fever and unexpected weight change.   HENT: Negative for hearing loss, sore throat and trouble swallowing.    Eyes: Negative for visual disturbance.   Respiratory: Negative for cough, chest tightness and shortness of breath.    Cardiovascular: Negative for chest pain and leg swelling.   Gastrointestinal: Negative for abdominal pain, blood in stool, constipation, diarrhea, nausea and vomiting.   Endocrine: Negative for polydipsia and polyuria.   Genitourinary: Negative for decreased urine volume, difficulty urinating, dysuria, frequency and urgency.   Musculoskeletal: Negative for gait problem.  "  Skin: Negative for rash.   Neurological: Negative for dizziness and numbness.   Psychiatric/Behavioral: The patient is not nervous/anxious.        Objective:      Vitals:    01/21/20 1000   BP: (!) 144/56   Pulse: 81   SpO2: 98%   Weight: 111.9 kg (246 lb 11.1 oz)   Height: 6' 2" (1.88 m)      Physical Exam   Constitutional: He is oriented to person, place, and time. He appears well-developed and well-nourished. No distress.   HENT:   Head: Normocephalic and atraumatic.   Mouth/Throat: Oropharynx is clear and moist. No oropharyngeal exudate.   Eyes: Pupils are equal, round, and reactive to light. Conjunctivae and EOM are normal. No scleral icterus.   Neck: No thyromegaly present.   Cardiovascular: Normal rate, regular rhythm and normal heart sounds.   No murmur heard.  Pulmonary/Chest: Effort normal and breath sounds normal. He has no wheezes. He has no rales.   Abdominal: Soft. He exhibits no distension. There is no tenderness.   Musculoskeletal: He exhibits no edema or tenderness.   Lymphadenopathy:     He has no cervical adenopathy.   Neurological: He is alert and oriented to person, place, and time.   Skin: Skin is warm and dry.   Psychiatric: He has a normal mood and affect. His behavior is normal.       Assessment:       1. Elevated total protein    2. CKD (chronic kidney disease) stage 3, GFR 30-59 ml/min    3. Elevated alkaline phosphatase level    4. Vitamin D deficiency    5. Anemia, unspecified type    6. Recurrent prostate cancer    7. Anesthesia of skin         Plan:       Tip was seen today for follow-up.    Diagnoses and all orders for this visit:    Elevated total protein  -     Ambulatory Referral to Hematology / Oncology    CKD (chronic kidney disease) stage 3, GFR 30-59 ml/min   This is most likely due to long-standing uncontrolled high the low-protein, history of prostate cancer would appreciate input from Nephrology.  How to protect the kidney discussed with patient.  -     Ambulatory " Referral to Nephrology    Elevated alkaline phosphatase level   This is new and mild in in the setting a fairly significant vitamin D deficiency and no elevated calcium.  He does carry history of prostate cancer with an elevating PSA which is concerning, but per our friends in Urology I would expect a much higher PSA level in the setting of metastasis as a cause of his elevated alk-phos.  That being said if does not respond adequately with vitamin-D supplementation and improvement of this low threshold for bone scan.  -     Gamma GT; Future    Vitamin D deficiency   Replace    Anemia, unspecified type   Likely due to longstanding CKD, but with all his other minimal abnormalities as well as his prostate cancer history will have patient see Hematology.  -     Vitamin B12; Future  -     Ferritin; Future  -     Iron and TIBC; Future  -     Reticulocytes; Future  -     Pathologist Interpretation Differential; Future  -     cholecalciferol, vitamin D3, 50,000 unit capsule; Take 1 capsule (50,000 Units total) by mouth once a week.  Recurrent prostate cancer   Per PSA recurrence would still be localized and not metastasize.  Will have patient see urology slightly had of his routine schedule.    Anesthesia of skin   -     Vitamin B12; Future    Return to clinic after seen multiple specialist.             Moe Contreras MD  Internal Medicine-[facility]        Side effects of medication(s) were discussed in detail and patient voiced understanding.  Patient will call back for any issues or complications.

## 2020-01-22 LAB
PATHOLOGIST INTERPRETATION IFE: NORMAL
PATHOLOGIST INTERPRETATION SPE: NORMAL

## 2020-02-07 NOTE — PROGRESS NOTES
CC:  IgM lambda monoclonal gammopathy    HPI:  Mr Hurst is an 78 yo man with HTN, CKD stage 3, history of prostate cancer, gout, who presents today for further evaluation of IgM monoclonal gammopathy. He has chronic anemia with Hb in the 12s range for at least 9 years. CBC on 2020 showed WBC 6.49, Hb 12.5, plt count 367. CMP showed creatinine of 1.6 (chronic), calcium normal 9.9, Protein 8.7, albumin 3.8. workup for the protein gap included an SPEP showing a monoclonal protein peak in gamma 1.07 g/dL. IFX showed IgM lambda monoclonal band. UPEP is negative for paraprotein bands. HIV, hep B and Hep C negative. He presents today for further evaluation. Feeling well. No weight loss, malaise, lightheadedness, dizziness, change in vision, headaches, fatigue. Chronic arthritic pain in hips and knees has recently improved.     ECO    Past Medical History:   Diagnosis Date    Hyperlipidemia     Hypertension         Past Surgical History:   Procedure Laterality Date    LEG SURGERY      PROSTATE SURGERY         Family History   Problem Relation Age of Onset    Diabetes Sister     Heart disease Sister     Aneurysm Brother     Cancer Brother        Social History     Socioeconomic History    Marital status:      Spouse name: Not on file    Number of children: Not on file    Years of education: Not on file    Highest education level: Not on file   Occupational History    Not on file   Social Needs    Financial resource strain: Not on file    Food insecurity:     Worry: Not on file     Inability: Not on file    Transportation needs:     Medical: Not on file     Non-medical: Not on file   Tobacco Use    Smoking status: Never Smoker    Smokeless tobacco: Never Used   Substance and Sexual Activity    Alcohol use: No    Drug use: No    Sexual activity: Never   Lifestyle    Physical activity:     Days per week: Not on file     Minutes per session: Not on file    Stress: Not on file    Relationships    Social connections:     Talks on phone: Not on file     Gets together: Not on file     Attends [employer] service: Not on file     Active member of club or organization: Not on file     Attends meetings of clubs or organizations: Not on file     Relationship status: Not on file   Other Topics Concern    Not on file   Social History Narrative    Not on file       Review of Systems   Constitutional: Negative for appetite change, chills, fatigue, fever and unexpected weight change.   HENT: Negative for mouth sores, nosebleeds, tinnitus, trouble swallowing and voice change.    Eyes: Negative for pain, redness and visual disturbance.   Respiratory: Negative for cough, shortness of breath and wheezing.    Cardiovascular: Negative for chest pain, palpitations and leg swelling.   Gastrointestinal: Negative for abdominal distention, abdominal pain, blood in stool, constipation, diarrhea, nausea and vomiting.   Endocrine: Negative for polydipsia, polyphagia and polyuria.   Genitourinary: Negative for flank pain, frequency and hematuria.   Musculoskeletal: Positive for arthralgias. Negative for back pain, gait problem, joint swelling, myalgias, neck pain and neck stiffness.   Skin: Negative for color change, pallor, rash and wound.   Neurological: Negative for tremors, seizures, syncope, speech difficulty, weakness, light-headedness, numbness and headaches.   Hematological: Negative for adenopathy. Does not bruise/bleed easily.   Psychiatric/Behavioral: Negative for confusion, dysphoric mood and self-injury. The patient is not nervous/anxious.    All other systems reviewed and are negative.      Objective:  Physical Exam   Constitutional: He is oriented to person, place, and time. He appears well-developed and well-nourished. No distress.   HENT:   Head: Normocephalic and atraumatic.   Mouth/Throat: Oropharynx is clear and moist. No oropharyngeal exudate.   Eyes: Pupils are equal, round, and reactive to  light. Conjunctivae and EOM are normal. No scleral icterus.   Neck: Normal range of motion. Neck supple. No JVD present. No thyromegaly present.   Cardiovascular: Normal rate, regular rhythm and normal heart sounds. Exam reveals no gallop and no friction rub.   No murmur heard.  Pulmonary/Chest: Effort normal and breath sounds normal. No respiratory distress. He has no wheezes. He has no rales.   Abdominal: Soft. Bowel sounds are normal. He exhibits no distension and no mass. There is no hepatosplenomegaly. There is no tenderness. There is no rebound. No hernia.   Musculoskeletal: Normal range of motion. He exhibits no edema, tenderness or deformity.   Lymphadenopathy:     He has no cervical adenopathy.        Right: No supraclavicular adenopathy present.        Left: No supraclavicular adenopathy present.   Neurological: He is alert and oriented to person, place, and time. No cranial nerve deficit. He exhibits normal muscle tone.   Skin: Skin is warm and dry. No rash noted. No erythema. No pallor.   Psychiatric: He has a normal mood and affect. His behavior is normal. Judgment and thought content normal.   Vitals reviewed.      Labs:  CBC on 1/6/2020 showed WBC 6.49, Hb 12.5, plt count 367. CMP showed creatinine of 1.6 (chronic), calcium normal 9.9, Protein 8.7, albumin 3.8. workup for the protein gap included an SPEP showing a monoclonal protein peak in gamma 1.07 g/dL. IFX showed IgM lambda monoclonal band. UPEP is negative for paraprotein bands. HIV, hep B and Hep C negative.       Assessment and plan:    1. IgM lambda monoclonal gammopathy    2. Stage 3 chronic kidney disease       1.  - Mr Hurst is an 78 yo man with IgM lambda monoclonal gammopathy. He does not have symptoms suggestive of hyperviscosity syndrome.   - Discussed with patient and daughter re the pathophysiology and manifestations of Waldenstrom macroglobulinemia. Discussed further workup including blood work, CT C/A/P, and bone marrow biopsy.  Discussed the procedure of BMBx. Discussed patients with Waldenstrom will need treatment. They understand and agree with the plan  - check Immunoglobulins, free light chain, LDH  - get CT chest abdomen pelvis without contrast to assess lymphadenopathy and hepatosplenomegaly  - schedule for BMBx, test for [id number]  - see me 2 weeks after BMBx to discuss results    2.  - chronic. Cr 1.6  - CT C/A/P without contrast to avoid nephrotoxicity

## 2020-02-11 ENCOUNTER — INITIAL CONSULT (OUTPATIENT)
Dept: HEMATOLOGY/ONCOLOGY | Facility: CLINIC | Age: 80
End: 2020-02-11
Attending: INTERNAL MEDICINE
Payer: MEDICARE

## 2020-02-11 ENCOUNTER — LAB VISIT (OUTPATIENT)
Dept: LAB | Facility: OTHER | Age: 80
End: 2020-02-11
Attending: INTERNAL MEDICINE
Payer: MEDICARE

## 2020-02-11 VITALS
DIASTOLIC BLOOD PRESSURE: 82 MMHG | BODY MASS INDEX: 30.79 KG/M2 | WEIGHT: 239.88 LBS | HEART RATE: 86 BPM | SYSTOLIC BLOOD PRESSURE: 183 MMHG | TEMPERATURE: 97 F | OXYGEN SATURATION: 97 % | HEIGHT: 74 IN | RESPIRATION RATE: 17 BRPM

## 2020-02-11 DIAGNOSIS — D47.2 IGM LAMBDA MONOCLONAL GAMMOPATHY: Primary | ICD-10-CM

## 2020-02-11 DIAGNOSIS — D47.2 IGM LAMBDA MONOCLONAL GAMMOPATHY: ICD-10-CM

## 2020-02-11 DIAGNOSIS — N18.30 STAGE 3 CHRONIC KIDNEY DISEASE: ICD-10-CM

## 2020-02-11 LAB — LDH SERPL L TO P-CCNC: 167 U/L (ref 110–260)

## 2020-02-11 PROCEDURE — 3079F DIAST BP 80-89 MM HG: CPT | Mod: CPTII,S$GLB,, | Performed by: INTERNAL MEDICINE

## 2020-02-11 PROCEDURE — 3079F PR MOST RECENT DIASTOLIC BLOOD PRESSURE 80-89 MM HG: ICD-10-PCS | Mod: CPTII,S$GLB,, | Performed by: INTERNAL MEDICINE

## 2020-02-11 PROCEDURE — 1126F AMNT PAIN NOTED NONE PRSNT: CPT | Mod: S$GLB,,, | Performed by: INTERNAL MEDICINE

## 2020-02-11 PROCEDURE — 1101F PR PT FALLS ASSESS DOC 0-1 FALLS W/OUT INJ PAST YR: ICD-10-PCS | Mod: CPTII,S$GLB,, | Performed by: INTERNAL MEDICINE

## 2020-02-11 PROCEDURE — 99499 RISK ADDL DX/OHS AUDIT: ICD-10-PCS | Mod: S$GLB,,, | Performed by: INTERNAL MEDICINE

## 2020-02-11 PROCEDURE — 82784 ASSAY IGA/IGD/IGG/IGM EACH: CPT | Mod: 59

## 2020-02-11 PROCEDURE — 36415 COLL VENOUS BLD VENIPUNCTURE: CPT

## 2020-02-11 PROCEDURE — 1159F MED LIST DOCD IN RCRD: CPT | Mod: S$GLB,,, | Performed by: INTERNAL MEDICINE

## 2020-02-11 PROCEDURE — 3077F SYST BP >= 140 MM HG: CPT | Mod: CPTII,S$GLB,, | Performed by: INTERNAL MEDICINE

## 2020-02-11 PROCEDURE — 99499 UNLISTED E&M SERVICE: CPT | Mod: S$GLB,,, | Performed by: INTERNAL MEDICINE

## 2020-02-11 PROCEDURE — 3077F PR MOST RECENT SYSTOLIC BLOOD PRESSURE >= 140 MM HG: ICD-10-PCS | Mod: CPTII,S$GLB,, | Performed by: INTERNAL MEDICINE

## 2020-02-11 PROCEDURE — 99204 OFFICE O/P NEW MOD 45 MIN: CPT | Mod: S$GLB,,, | Performed by: INTERNAL MEDICINE

## 2020-02-11 PROCEDURE — 99999 PR PBB SHADOW E&M-EST. PATIENT-LVL III: CPT | Mod: PBBFAC,,, | Performed by: INTERNAL MEDICINE

## 2020-02-11 PROCEDURE — 1126F PR PAIN SEVERITY QUANTIFIED, NO PAIN PRESENT: ICD-10-PCS | Mod: S$GLB,,, | Performed by: INTERNAL MEDICINE

## 2020-02-11 PROCEDURE — 83615 LACTATE (LD) (LDH) ENZYME: CPT

## 2020-02-11 PROCEDURE — 83520 IMMUNOASSAY QUANT NOS NONAB: CPT

## 2020-02-11 PROCEDURE — 99999 PR PBB SHADOW E&M-EST. PATIENT-LVL III: ICD-10-PCS | Mod: PBBFAC,,, | Performed by: INTERNAL MEDICINE

## 2020-02-11 PROCEDURE — 1159F PR MEDICATION LIST DOCUMENTED IN MEDICAL RECORD: ICD-10-PCS | Mod: S$GLB,,, | Performed by: INTERNAL MEDICINE

## 2020-02-11 PROCEDURE — 1101F PT FALLS ASSESS-DOCD LE1/YR: CPT | Mod: CPTII,S$GLB,, | Performed by: INTERNAL MEDICINE

## 2020-02-11 PROCEDURE — 99204 PR OFFICE/OUTPT VISIT, NEW, LEVL IV, 45-59 MIN: ICD-10-PCS | Mod: S$GLB,,, | Performed by: INTERNAL MEDICINE

## 2020-02-11 NOTE — Clinical Note
Get LDH, immunoglobulins, serum free light chains today. Schedule for CT chest/abdomen/pelvis without contrast and let patient know. Schedule for first available bone marrow biopsy and let patient know. Let me know BMBx date. See me 2 weeks after BMBx

## 2020-02-11 NOTE — LETTER
February 11, 2020      Moe Gallardo MD  [address]  [address]  [facility] [address]           [facility] [place] [address] [place] [address]  [address], [address]  [facility] [address]  Phone: [phone]          Patient: Tip Hurst   MR Number: [id number]   YOB: 1940   Date of Visit: 2/11/2020       Dear Dr. Moe Gallardo:    Thank you for referring Tip Hurst to me for evaluation. Attached you will find relevant portions of my assessment and plan of care.    If you have questions, please do not hesitate to call me. I look forward to following Tip Hurst along with you.    Sincerely,    Nikko Chairez MD    Enclosure  CC:  No Recipients    If you would like to receive this communication electronically, please contact [email or web address] or [phone] to request more information on [employer] Link access.    For providers and/or their staff who would like to refer a patient to [facility], please contact us through our one-stop-shop provider referral line, [facility], at [phone].    If you feel you have received this communication in error or would no longer like to receive these types of communications, please e-mail [email or web address]

## 2020-02-12 LAB
IGA SERPL-MCNC: 313 MG/DL (ref 40–350)
IGG SERPL-MCNC: 1077 MG/DL (ref 650–1600)
IGM SERPL-MCNC: 1214 MG/DL (ref 50–300)
KAPPA LC SER QL IA: 3.57 MG/DL (ref 0.33–1.94)
KAPPA LC/LAMBDA SER IA: 0.27 (ref 0.26–1.65)
LAMBDA LC SER QL IA: 13.38 MG/DL (ref 0.57–2.63)

## 2020-02-18 ENCOUNTER — HOSPITAL ENCOUNTER (OUTPATIENT)
Dept: RADIOLOGY | Facility: OTHER | Age: 80
Discharge: HOME | End: 2020-02-18
Attending: INTERNAL MEDICINE
Payer: MEDICARE

## 2020-02-18 ENCOUNTER — TELEPHONE (OUTPATIENT)
Dept: HEMATOLOGY/ONCOLOGY | Facility: CLINIC | Age: 80
End: 2020-02-18

## 2020-02-18 DIAGNOSIS — D47.2 IGM LAMBDA MONOCLONAL GAMMOPATHY: ICD-10-CM

## 2020-02-18 PROCEDURE — 71250 CT THORAX DX C-: CPT | Mod: TC

## 2020-02-18 PROCEDURE — 74176 CT CHEST ABDOMEN PELVIS WITHOUT CONTRAST(XPD): ICD-10-PCS | Mod: 26,,, | Performed by: RADIOLOGY

## 2020-02-18 PROCEDURE — 71250 CT THORAX DX C-: CPT | Mod: 26,,, | Performed by: RADIOLOGY

## 2020-02-18 PROCEDURE — 74176 CT ABD & PELVIS W/O CONTRAST: CPT | Mod: 26,,, | Performed by: RADIOLOGY

## 2020-02-18 PROCEDURE — 74176 CT ABD & PELVIS W/O CONTRAST: CPT | Mod: TC

## 2020-02-18 PROCEDURE — 25500020 PHARM REV CODE 255: Performed by: INTERNAL MEDICINE

## 2020-02-18 PROCEDURE — 71250 CT CHEST ABDOMEN PELVIS WITHOUT CONTRAST(XPD): ICD-10-PCS | Mod: 26,,, | Performed by: RADIOLOGY

## 2020-02-18 RX ADMIN — IOHEXOL 1000 ML: 12 SOLUTION ORAL at 07:02

## 2020-02-18 NOTE — TELEPHONE ENCOUNTER
Called patient multiple times today re CT scan result. NA. Left VM for patient to return my call.

## 2020-02-26 DIAGNOSIS — E78.00 HYPERCHOLESTEROLEMIA: ICD-10-CM

## 2020-02-26 RX ORDER — SIMVASTATIN 40 MG/1
TABLET, FILM COATED ORAL
Qty: 90 TABLET | Refills: 3 | Status: SHIPPED | OUTPATIENT
Start: 2020-02-26 | End: 2021-03-01

## 2020-03-05 ENCOUNTER — OFFICE VISIT (OUTPATIENT)
Dept: HEMATOLOGY/ONCOLOGY | Facility: CLINIC | Age: 80
End: 2020-03-05
Payer: MEDICARE

## 2020-03-05 ENCOUNTER — TELEPHONE (OUTPATIENT)
Dept: HEMATOLOGY/ONCOLOGY | Facility: CLINIC | Age: 80
End: 2020-03-05

## 2020-03-05 VITALS
HEIGHT: 74 IN | TEMPERATURE: 98 F | HEART RATE: 80 BPM | OXYGEN SATURATION: 98 % | DIASTOLIC BLOOD PRESSURE: 88 MMHG | BODY MASS INDEX: 31.04 KG/M2 | RESPIRATION RATE: 16 BRPM | SYSTOLIC BLOOD PRESSURE: 194 MMHG | WEIGHT: 241.88 LBS

## 2020-03-05 DIAGNOSIS — R21 RASH: ICD-10-CM

## 2020-03-05 DIAGNOSIS — D47.2 IGM LAMBDA MONOCLONAL GAMMOPATHY: Primary | ICD-10-CM

## 2020-03-05 DIAGNOSIS — N32.9 LESION OF BLADDER: ICD-10-CM

## 2020-03-05 PROCEDURE — 99499 RISK ADDL DX/OHS AUDIT: ICD-10-PCS | Mod: S$GLB,,, | Performed by: INTERNAL MEDICINE

## 2020-03-05 PROCEDURE — 1125F PR PAIN SEVERITY QUANTIFIED, PAIN PRESENT: ICD-10-PCS | Mod: S$GLB,,, | Performed by: INTERNAL MEDICINE

## 2020-03-05 PROCEDURE — 1159F PR MEDICATION LIST DOCUMENTED IN MEDICAL RECORD: ICD-10-PCS | Mod: S$GLB,,, | Performed by: INTERNAL MEDICINE

## 2020-03-05 PROCEDURE — 1159F MED LIST DOCD IN RCRD: CPT | Mod: S$GLB,,, | Performed by: INTERNAL MEDICINE

## 2020-03-05 PROCEDURE — 99214 PR OFFICE/OUTPT VISIT, EST, LEVL IV, 30-39 MIN: ICD-10-PCS | Mod: S$GLB,,, | Performed by: INTERNAL MEDICINE

## 2020-03-05 PROCEDURE — 1101F PR PT FALLS ASSESS DOC 0-1 FALLS W/OUT INJ PAST YR: ICD-10-PCS | Mod: CPTII,S$GLB,, | Performed by: INTERNAL MEDICINE

## 2020-03-05 PROCEDURE — 3077F SYST BP >= 140 MM HG: CPT | Mod: CPTII,S$GLB,, | Performed by: INTERNAL MEDICINE

## 2020-03-05 PROCEDURE — 99214 OFFICE O/P EST MOD 30 MIN: CPT | Mod: S$GLB,,, | Performed by: INTERNAL MEDICINE

## 2020-03-05 PROCEDURE — 99999 PR PBB SHADOW E&M-EST. PATIENT-LVL IV: CPT | Mod: PBBFAC,,, | Performed by: INTERNAL MEDICINE

## 2020-03-05 PROCEDURE — 1125F AMNT PAIN NOTED PAIN PRSNT: CPT | Mod: S$GLB,,, | Performed by: INTERNAL MEDICINE

## 2020-03-05 PROCEDURE — 1101F PT FALLS ASSESS-DOCD LE1/YR: CPT | Mod: CPTII,S$GLB,, | Performed by: INTERNAL MEDICINE

## 2020-03-05 PROCEDURE — 99999 PR PBB SHADOW E&M-EST. PATIENT-LVL IV: ICD-10-PCS | Mod: PBBFAC,,, | Performed by: INTERNAL MEDICINE

## 2020-03-05 PROCEDURE — 99499 UNLISTED E&M SERVICE: CPT | Mod: S$GLB,,, | Performed by: INTERNAL MEDICINE

## 2020-03-05 PROCEDURE — 3077F PR MOST RECENT SYSTOLIC BLOOD PRESSURE >= 140 MM HG: ICD-10-PCS | Mod: CPTII,S$GLB,, | Performed by: INTERNAL MEDICINE

## 2020-03-05 PROCEDURE — 3079F DIAST BP 80-89 MM HG: CPT | Mod: CPTII,S$GLB,, | Performed by: INTERNAL MEDICINE

## 2020-03-05 PROCEDURE — 3079F PR MOST RECENT DIASTOLIC BLOOD PRESSURE 80-89 MM HG: ICD-10-PCS | Mod: CPTII,S$GLB,, | Performed by: INTERNAL MEDICINE

## 2020-03-05 NOTE — PROGRESS NOTES
"                                                         PROGRESS NOTE    Subjective:       Patient ID: Tip Hurst is a 79 y.o. male.    Chief Complaint: follow up for IgM lambda monoclonal gammopathy    Diagnosis:   IgM lambda monoclonal gammopathy    Hematologic History:  1. Mr Hurst is an 78 yo man with HTN, CKD stage 3, history of prostate cancer, gout, who initially saw me on 2020 for further evaluation of IgM monoclonal gammopathy. He has chronic anemia with Hb in the 12s range for at least 9 years. CBC on 2020 showed WBC 6.49, Hb 12.5, plt count 367. CMP showed creatinine of 1.6 (chronic), calcium normal 9.9, Protein 8.7, albumin 3.8. workup for the protein gap included an SPEP showing a monoclonal protein peak in gamma 1.07 g/dL. IFX showed IgM lambda monoclonal band. UPEP is negative for paraprotein bands. HIV, hep B and Hep C negative. He presents today for further evaluation. Feeling well. No weight loss, malaise, lightheadedness, dizziness, change in vision, headaches, fatigue. Chronic arthritic pain in hips and knees has recently improved.   2. Labs on 20 showed IgM 1214. Lambda 13.38, kappa 3.57, L/K ratio 3.75  3. CT C/A/P on 20 showed "Low-density bladder wall thickening near the dome of the urinary bladder measuring approximately 1.9 x 1.7 x 2.7 cm in size.  Further evaluation with cystoscopy is recommended. Fatty changes of the liver. No adenopathy identified. Prominent coronary atherosclerosis. 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 Patient did not show for BMBx appointment on 20.     Interval History:   Mr Hurst returns today for follow up. He did not show for his BMBx appointment on 20 as he thought that appointment was scheduled by mistake. Chronic back pain. Noted rash over bilateral groin/upper thigh area over the past month.     ECO      ROS:   A ten-point system review is obtained and negative except for what was stated in the Interval " "History.     Physical Examination:   Vital signs reviewed.   General: well hydrated, well developed, in no acute distress  HEENT: normocephalic, PERRLA, EOMI, anicteric sclerae, oropharynx clear  Neck: supple, no JVD, thyromegaly, cervical or supraclavicular lymphadenopathy  Lungs: clear breath sounds bilaterally, no wheezing, rales, or rhonchi  Heart: RRR, no M/R/G  Abdomen: soft, no tenderness, non-distended, no hepatosplenomegaly, mass, or hernia. BS present  Extremities: no clubbing, cyanosis, or edema  Skin: +erythematous rash over bilateral inguinal/upper thigh   Neuro: alert and oriented x 4, no focal neuro deficit  Psych: pleasant and appropriate mood and affect    Objective:     Laboratory Data:  Labs reviewed.  IgM 1214. Lambda 13.38, kappa 3.57, L/K ratio 3.75    Imaging Data:  CT C/A/P on 2/18/20 showed "Low-density bladder wall thickening near the dome of the urinary bladder measuring approximately 1.9 x 1.7 x 2.7 cm in size.  Further evaluation with cystoscopy is recommended. Fatty changes of the liver. No adenopathy identified. Prominent coronary atherosclerosis. SI joints with bony fusion of the left SI joint."      Assessment and Plan:     1. IgM lambda monoclonal gammopathy    2. Lesion of bladder    3. Rash      1.  - Mr Hurst is an 78 yo man with IgM lambda monoclonal gammopathy. He does not have symptoms suggestive of hyperviscosity syndrome.   - again discussed with patient re the pathophysiology and manifestations of Waldenstrom macroglobulinemia. Discussed that this is a type of blood cancer.   - discussed CT scan result. No adenopathy or hepatosplenomegaly  - discussed with patient that we need to get bone marrow biopsy to complete the workup. Patient thought the BMBx appt two weeks ago was scheduled by mistake, but is willing to have BMBx scheduled again  - discussed with patient he needs to be seen in the clinic two weeks after BMBx to discuss results. I will be out of office at that " time. He will see one of the BMT providers to discuss results. depending on the result, he may be monitored or treatment may be recommended. Patient understands and agrees with the plan. He is agreeable with seeing BMT providers to discuss BMBx results.  - schedule for BMBx, test for [id number]    2.  - followed by Dr West for prostate cancer. Previously had bladder lesion biopsied in 2018 according to Dr West's note  - messaged Dr West. Refer to Dr West for further management    3.  - refer to dermatology        Follow-up:     RTC 2 weeks after BMBx  Knows to call in the interval if any problems arise.    Electronically signed by Nikko Chairez         97

## 2020-03-05 NOTE — TELEPHONE ENCOUNTER
----- Message from Nikko Chairez MD sent at 3/5/2020  3:25 PM CST -----  Please schedule patient to see Dr West with urology (saw Dr West in the past) and see dermatology. Please schedule patient for first available bone marrow biopsy. Let me know the date. Return to see one of the BMT providers two weeks after bone marrow biopsy to discuss results. Please call patient for appointment time

## 2020-03-05 NOTE — TELEPHONE ENCOUNTER
Called PT to schedule bone biopsy and offer an appointment with Derm tomorrow. No answer, left VM for PT to return call to clinic.

## 2020-03-05 NOTE — Clinical Note
Please schedule patient to see Dr West with urology (saw Dr West in the past) and see dermatology. Please schedule patient for first available bone marrow biopsy. Let me know the date. Return to see one of the BMT providers two weeks after bone marrow biopsy to discuss results. Please call patient for appointment time

## 2020-03-11 ENCOUNTER — TELEPHONE (OUTPATIENT)
Dept: UROLOGY | Facility: CLINIC | Age: 80
End: 2020-03-11

## 2020-03-11 ENCOUNTER — PATIENT OUTREACH (OUTPATIENT)
Dept: ADMINISTRATIVE | Facility: HOSPITAL | Age: 80
End: 2020-03-11

## 2020-03-11 DIAGNOSIS — D47.2 IGM MONOCLONAL GAMMOPATHY OF UNCERTAIN SIGNIFICANCE: Primary | ICD-10-CM

## 2020-03-12 DIAGNOSIS — D47.2 MGUS (MONOCLONAL GAMMOPATHY OF UNKNOWN SIGNIFICANCE): Primary | ICD-10-CM

## 2020-03-13 ENCOUNTER — TELEPHONE (OUTPATIENT)
Dept: HEMATOLOGY/ONCOLOGY | Facility: CLINIC | Age: 80
End: 2020-03-13

## 2020-03-13 NOTE — TELEPHONE ENCOUNTER
Lisseth Quevedo helped with orders.  Pt scheduled for 3/19  1pm    ----- Message from Nikko Chairez MD sent at 3/5/2020  5:05 PM CST -----  Regarding: RE: Bone marrow biopsy  He has not signed a consent. I will put in the orders    ----- Message -----  From: Jossy Cowan RN  Sent: 3/5/2020   4:57 PM CST  To: Alexandria Patel, Nikko Chairez MD  Subject: Bone marrow biopsy                               Dr Chairez,  Can you put orders in for BMBx?  Pt can't come next week due to transportation but he can come March 19 for hospital procedure.  Has he signed a consent form?    Alexandria, can you please schedule this patient for March 19th?    - Jossy  ----- Message -----  From: Nikko Chairez MD  Sent: 3/5/2020   3:25 PM CST  To: Jossy Cowan RN    Please schedule patient to see Dr West with urology (saw Dr West in the past) and see dermatology. Please schedule patient for first available bone marrow biopsy. Let me know the date. Return to see one of the BMT providers two weeks after bone marrow biopsy to discuss results. Please call patient for appointment time

## 2020-03-18 ENCOUNTER — TELEPHONE (OUTPATIENT)
Dept: UROLOGY | Facility: CLINIC | Age: 80
End: 2020-03-18

## 2020-03-18 ENCOUNTER — LAB VISIT (OUTPATIENT)
Dept: LAB | Facility: OTHER | Age: 80
End: 2020-03-18
Attending: UROLOGY
Payer: MEDICARE

## 2020-03-18 ENCOUNTER — TELEPHONE (OUTPATIENT)
Dept: HEMATOLOGY/ONCOLOGY | Facility: CLINIC | Age: 80
End: 2020-03-18

## 2020-03-18 DIAGNOSIS — R20.0 ANESTHESIA OF SKIN: ICD-10-CM

## 2020-03-18 DIAGNOSIS — D64.9 ANEMIA, UNSPECIFIED TYPE: ICD-10-CM

## 2020-03-18 DIAGNOSIS — R97.20 ELEVATED PROSTATE SPECIFIC ANTIGEN (PSA): ICD-10-CM

## 2020-03-18 DIAGNOSIS — C61 PROSTATE CANCER: ICD-10-CM

## 2020-03-18 LAB
BASOPHILS # BLD AUTO: 0.09 K/UL (ref 0–0.2)
BASOPHILS NFR BLD: 0.9 % (ref 0–1.9)
COMPLEXED PSA SERPL-MCNC: 0.39 NG/ML (ref 0–4)
DIFFERENTIAL METHOD: ABNORMAL
EOSINOPHIL # BLD AUTO: 0.4 K/UL (ref 0–0.5)
EOSINOPHIL NFR BLD: 3.8 % (ref 0–8)
ERYTHROCYTE [DISTWIDTH] IN BLOOD BY AUTOMATED COUNT: 13 % (ref 11.5–14.5)
FERRITIN SERPL-MCNC: 1755 NG/ML (ref 20–300)
HCT VFR BLD AUTO: 37.6 % (ref 40–54)
HGB BLD-MCNC: 11.5 G/DL (ref 14–18)
IMM GRANULOCYTES # BLD AUTO: 0.06 K/UL (ref 0–0.04)
IMM GRANULOCYTES NFR BLD AUTO: 0.6 % (ref 0–0.5)
IRON SERPL-MCNC: 73 UG/DL (ref 45–160)
LYMPHOCYTES # BLD AUTO: 3.8 K/UL (ref 1–4.8)
LYMPHOCYTES NFR BLD: 36.6 % (ref 18–48)
MCH RBC QN AUTO: 27.4 PG (ref 27–31)
MCHC RBC AUTO-ENTMCNC: 30.6 G/DL (ref 32–36)
MCV RBC AUTO: 90 FL (ref 82–98)
MONOCYTES # BLD AUTO: 0.9 K/UL (ref 0.3–1)
MONOCYTES NFR BLD: 8.6 % (ref 4–15)
NEUTROPHILS # BLD AUTO: 5.1 K/UL (ref 1.8–7.7)
NEUTROPHILS NFR BLD: 49.5 % (ref 38–73)
NRBC BLD-RTO: 0 /100 WBC
PATH REV BLD -IMP: NORMAL
PLATELET # BLD AUTO: 433 K/UL (ref 150–350)
PMV BLD AUTO: 9.7 FL (ref 9.2–12.9)
RBC # BLD AUTO: 4.19 M/UL (ref 4.6–6.2)
RETICS/RBC NFR AUTO: 2.9 % (ref 0.4–2)
SATURATED IRON: 21 % (ref 20–50)
TOTAL IRON BINDING CAPACITY: 346 UG/DL (ref 250–450)
TRANSFERRIN SERPL-MCNC: 234 MG/DL (ref 200–375)
VIT B12 SERPL-MCNC: 477 PG/ML (ref 210–950)
WBC # BLD AUTO: 10.31 K/UL (ref 3.9–12.7)

## 2020-03-18 PROCEDURE — 85060 PATHOLOGIST REVIEW: ICD-10-PCS | Mod: ,,, | Performed by: PATHOLOGY

## 2020-03-18 PROCEDURE — 36415 COLL VENOUS BLD VENIPUNCTURE: CPT

## 2020-03-18 PROCEDURE — 82728 ASSAY OF FERRITIN: CPT

## 2020-03-18 PROCEDURE — 85045 AUTOMATED RETICULOCYTE COUNT: CPT

## 2020-03-18 PROCEDURE — 85025 COMPLETE CBC W/AUTO DIFF WBC: CPT

## 2020-03-18 PROCEDURE — 82607 VITAMIN B-12: CPT

## 2020-03-18 PROCEDURE — 85060 BLOOD SMEAR INTERPRETATION: CPT | Mod: ,,, | Performed by: PATHOLOGY

## 2020-03-18 PROCEDURE — 83540 ASSAY OF IRON: CPT

## 2020-03-18 PROCEDURE — 84153 ASSAY OF PSA TOTAL: CPT

## 2020-03-18 NOTE — TELEPHONE ENCOUNTER
Informed pt of bmbx date and time, also stated to arrive on the second floor in the main hospital day of surgery center, nothing to eat or drink after midnight and to hold all blood thinning medications unless doctor instructed other wise, the pt will need a ride home, left message

## 2020-03-19 LAB — PATH REV BLD -IMP: NORMAL

## 2020-03-20 ENCOUNTER — TELEPHONE (OUTPATIENT)
Dept: NEPHROLOGY | Facility: CLINIC | Age: 80
End: 2020-03-20

## 2020-03-24 ENCOUNTER — TELEPHONE (OUTPATIENT)
Dept: INTERNAL MEDICINE | Facility: CLINIC | Age: 80
End: 2020-03-24

## 2020-03-24 NOTE — TELEPHONE ENCOUNTER
Called pt and informed her that we do recommended that your appointment be push out for three months to be seen in person.   pt showed verbal understanding   Mailing out appt reminder to patient

## 2020-03-30 ENCOUNTER — TELEPHONE (OUTPATIENT)
Dept: HEMATOLOGY/ONCOLOGY | Facility: CLINIC | Age: 80
End: 2020-03-30

## 2020-04-03 ENCOUNTER — LAB VISIT (OUTPATIENT)
Dept: LAB | Facility: OTHER | Age: 80
End: 2020-04-03
Payer: MEDICARE

## 2020-04-03 DIAGNOSIS — D47.2 IGM LAMBDA MONOCLONAL GAMMOPATHY: ICD-10-CM

## 2020-04-03 DIAGNOSIS — D47.2 IGM MONOCLONAL GAMMOPATHY OF UNCERTAIN SIGNIFICANCE: ICD-10-CM

## 2020-04-03 DIAGNOSIS — D47.2 IGM MONOCLONAL GAMMOPATHY OF UNCERTAIN SIGNIFICANCE: Primary | ICD-10-CM

## 2020-04-03 LAB
ALBUMIN SERPL BCP-MCNC: 3.7 G/DL (ref 3.5–5.2)
ALP SERPL-CCNC: 150 U/L (ref 55–135)
ALT SERPL W/O P-5'-P-CCNC: 34 U/L (ref 10–44)
ANION GAP SERPL CALC-SCNC: 13 MMOL/L (ref 8–16)
AST SERPL-CCNC: 24 U/L (ref 10–40)
BASOPHILS # BLD AUTO: 0.09 K/UL (ref 0–0.2)
BASOPHILS NFR BLD: 1 % (ref 0–1.9)
BILIRUB SERPL-MCNC: 0.5 MG/DL (ref 0.1–1)
BUN SERPL-MCNC: 16 MG/DL (ref 8–23)
CALCIUM SERPL-MCNC: 9.6 MG/DL (ref 8.7–10.5)
CHLORIDE SERPL-SCNC: 107 MMOL/L (ref 95–110)
CO2 SERPL-SCNC: 19 MMOL/L (ref 23–29)
CREAT SERPL-MCNC: 1.5 MG/DL (ref 0.5–1.4)
DIFFERENTIAL METHOD: ABNORMAL
EOSINOPHIL # BLD AUTO: 0.4 K/UL (ref 0–0.5)
EOSINOPHIL NFR BLD: 4.3 % (ref 0–8)
ERYTHROCYTE [DISTWIDTH] IN BLOOD BY AUTOMATED COUNT: 12.9 % (ref 11.5–14.5)
EST. GFR  (AFRICAN AMERICAN): 50 ML/MIN/1.73 M^2
EST. GFR  (NON AFRICAN AMERICAN): 43 ML/MIN/1.73 M^2
GLUCOSE SERPL-MCNC: 153 MG/DL (ref 70–110)
HCT VFR BLD AUTO: 36.5 % (ref 40–54)
HGB BLD-MCNC: 11.4 G/DL (ref 14–18)
IGA SERPL-MCNC: 301 MG/DL (ref 40–350)
IGG SERPL-MCNC: 1045 MG/DL (ref 650–1600)
IGM SERPL-MCNC: 1143 MG/DL (ref 50–300)
IMM GRANULOCYTES # BLD AUTO: 0.03 K/UL (ref 0–0.04)
IMM GRANULOCYTES NFR BLD AUTO: 0.3 % (ref 0–0.5)
LDH SERPL L TO P-CCNC: 167 U/L (ref 110–260)
LYMPHOCYTES # BLD AUTO: 3.4 K/UL (ref 1–4.8)
LYMPHOCYTES NFR BLD: 37.8 % (ref 18–48)
MCH RBC QN AUTO: 27.7 PG (ref 27–31)
MCHC RBC AUTO-ENTMCNC: 31.2 G/DL (ref 32–36)
MCV RBC AUTO: 89 FL (ref 82–98)
MONOCYTES # BLD AUTO: 0.7 K/UL (ref 0.3–1)
MONOCYTES NFR BLD: 8 % (ref 4–15)
NEUTROPHILS # BLD AUTO: 4.4 K/UL (ref 1.8–7.7)
NEUTROPHILS NFR BLD: 48.6 % (ref 38–73)
NRBC BLD-RTO: 0 /100 WBC
PLATELET # BLD AUTO: 345 K/UL (ref 150–350)
PMV BLD AUTO: 9.6 FL (ref 9.2–12.9)
POTASSIUM SERPL-SCNC: 4.4 MMOL/L (ref 3.5–5.1)
PROT SERPL-MCNC: 8.4 G/DL (ref 6–8.4)
RBC # BLD AUTO: 4.11 M/UL (ref 4.6–6.2)
SODIUM SERPL-SCNC: 139 MMOL/L (ref 136–145)
WBC # BLD AUTO: 9.1 K/UL (ref 3.9–12.7)

## 2020-04-03 PROCEDURE — 36415 COLL VENOUS BLD VENIPUNCTURE: CPT

## 2020-04-03 PROCEDURE — 84165 PATHOLOGIST INTERPRETATION SPE: ICD-10-PCS | Mod: 26,,, | Performed by: PATHOLOGY

## 2020-04-03 PROCEDURE — 84165 PROTEIN E-PHORESIS SERUM: CPT

## 2020-04-03 PROCEDURE — 86334 IMMUNOFIX E-PHORESIS SERUM: CPT

## 2020-04-03 PROCEDURE — 83520 IMMUNOASSAY QUANT NOS NONAB: CPT

## 2020-04-03 PROCEDURE — 83615 LACTATE (LD) (LDH) ENZYME: CPT

## 2020-04-03 PROCEDURE — 80053 COMPREHEN METABOLIC PANEL: CPT

## 2020-04-03 PROCEDURE — 85025 COMPLETE CBC W/AUTO DIFF WBC: CPT

## 2020-04-03 PROCEDURE — 84165 PROTEIN E-PHORESIS SERUM: CPT | Mod: 26,,, | Performed by: PATHOLOGY

## 2020-04-03 PROCEDURE — 86334 PATHOLOGIST INTERPRETATION IFE: ICD-10-PCS | Mod: 26,,, | Performed by: PATHOLOGY

## 2020-04-03 PROCEDURE — 82784 ASSAY IGA/IGD/IGG/IGM EACH: CPT | Mod: 59

## 2020-04-03 PROCEDURE — 86334 IMMUNOFIX E-PHORESIS SERUM: CPT | Mod: 26,,, | Performed by: PATHOLOGY

## 2020-04-06 ENCOUNTER — OFFICE VISIT (OUTPATIENT)
Dept: HEMATOLOGY/ONCOLOGY | Facility: CLINIC | Age: 80
End: 2020-04-06
Payer: MEDICARE

## 2020-04-06 ENCOUNTER — TELEPHONE (OUTPATIENT)
Dept: HEMATOLOGY/ONCOLOGY | Facility: CLINIC | Age: 80
End: 2020-04-06

## 2020-04-06 DIAGNOSIS — D47.2 MGUS (MONOCLONAL GAMMOPATHY OF UNKNOWN SIGNIFICANCE): Primary | ICD-10-CM

## 2020-04-06 LAB
ALBUMIN SERPL ELPH-MCNC: 3.9 G/DL (ref 3.35–5.55)
ALPHA1 GLOB SERPL ELPH-MCNC: 0.32 G/DL (ref 0.17–0.41)
ALPHA2 GLOB SERPL ELPH-MCNC: 1.09 G/DL (ref 0.43–0.99)
B-GLOBULIN SERPL ELPH-MCNC: 0.93 G/DL (ref 0.5–1.1)
GAMMA GLOB SERPL ELPH-MCNC: 1.77 G/DL (ref 0.67–1.58)
INTERPRETATION SERPL IFE-IMP: NORMAL
KAPPA LC SER QL IA: 3.39 MG/DL (ref 0.33–1.94)
KAPPA LC/LAMBDA SER IA: 0.26 (ref 0.26–1.65)
LAMBDA LC SER QL IA: 13.09 MG/DL (ref 0.57–2.63)
PATHOLOGIST INTERPRETATION IFE: NORMAL
PATHOLOGIST INTERPRETATION SPE: NORMAL
PROT SERPL-MCNC: 8 G/DL (ref 6–8.4)

## 2020-04-06 PROCEDURE — 99499 NO LOS: ICD-10-PCS | Mod: ,,, | Performed by: INTERNAL MEDICINE

## 2020-04-06 PROCEDURE — 99499 UNLISTED E&M SERVICE: CPT | Mod: ,,, | Performed by: INTERNAL MEDICINE

## 2020-04-06 NOTE — PROGRESS NOTES
"The patient location is: NA  The chief complaint leading to consultation is: IgM paraprotein  Visit type: audio only  Total time spent with patient: NA;   Each patient to whom he or she provides medical services by telemedicine is:  (1) informed of the relationship between the physician and patient and the respective role of any other health care provider with respect to management of the patient; and (2) notified that he or she may decline to receive medical services by telemedicine and may withdraw from such care at any time.    Notes:      Mr Hurst is an 78 yo man with HTN, CKD stage 3, history of prostate cancer, gout, who initially saw me on 2/11/2020 for further evaluation of IgM monoclonal gammopathy. He has chronic anemia with Hb in the 12s range for at least 9 years. CBC on 1/6/2020 showed WBC 6.49, Hb 12.5, plt count 367. CMP showed creatinine of 1.6 (chronic), calcium normal 9.9, Protein 8.7, albumin 3.8. workup for the protein gap included an SPEP showing a monoclonal protein peak in gamma 1.07 g/dL. IFX showed IgM lambda monoclonal band. UPEP is negative for paraprotein bands. HIV, hep B and Hep C negative. He presents today for further evaluation. Feeling well. No weight loss, malaise, lightheadedness, dizziness, change in vision, headaches, fatigue. Chronic arthritic pain in hips and knees has recently improved.   2. Labs on 2/11/20 showed IgM 1214. Lambda 13.38, kappa 3.57, L/K ratio 3.75  3. CT C/A/P on 2/18/20 showed "Low-density bladder wall thickening near the dome of the urinary bladder measuring approximately 1.9 x 1.7 x 2.7 cm in size.  Further evaluation with cystoscopy is recommended. Fatty changes of the liver. No adenopathy identified. Prominent coronary atherosclerosis. 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 Patient did not show for BMBx appointment on 2/20/20."    -ct c/a/p feb 2020 notable for no adenopathy/HSM; noted small bladder mass; ct not done with contrast so of " limited utility in assessing Lymphoproliferative disorder; consider pet if symptoms develop  -no showed to march 2020 bone marrow biopsy; reschedule post covid likely 2 months   -asymptomatic; IgM level stable from feb to April 2020  --Normal sflcr and paraprotein <1.5   -Ok to defer more imaging and marrow  In light of corona virus     Bladder mass   -noted on feb 2020 CT; done see urology fu; copy Dr. West to see if more urgent fu needed    Anemia  -Baseline stable  -Likely anemia of ckd  -nutritonal eval normal       Called patient 2 times over last week with no answer; left message x 2 requesting call back and summarizing lab work; will schedule fu appt as below so not lost to follow up    FU: cbc, cmp, serum free light chains, quantitative immunoglobulins, serum electropheresis, serum immunofixation and MD appt in 3 months         Tony Holliday MD  Hematology/Oncology/Bone Marrow Transplant

## 2020-04-06 NOTE — LETTER
April 15, 2020      Nikko Chairez MD  [address]  [address]  [facility] [address]           [facility]  [facility] [address]  Phone: [phone]          Patient: Tip Hurst   MR Number: [id number]   YOB: 1940   Date of Visit: 4/6/2020       Dear Dr. Nikko Chairez:    Thank you for referring Tip Hurst to me for evaluation. Attached you will find relevant portions of my assessment and plan of care.    If you have questions, please do not hesitate to call me. I look forward to following Tip Hurst along with you.    Sincerely,    Hardeep Holliday MD    Enclosure  CC:  No Recipients    If you would like to receive this communication electronically, please contact [email or web address] or [phone] to request more information on [employer] Link access.    For providers and/or their staff who would like to refer a patient to [facility], please contact us through our one-stop-shop provider referral line, [facility], at [phone].    If you feel you have received this communication in error or would no longer like to receive these types of communications, please e-mail [email or web address]

## 2020-04-06 NOTE — Clinical Note
cbc, cmp, serum free light chains, quantitative immunoglobulins, serum electropheresis, serum immunofixation and MD appt in 3 months

## 2020-04-30 ENCOUNTER — TELEPHONE (OUTPATIENT)
Dept: DERMATOLOGY | Facility: CLINIC | Age: 80
End: 2020-04-30

## 2020-04-30 NOTE — TELEPHONE ENCOUNTER
I have attempted without success to contact this patient by phone to cancel appointment and offer virtual visit

## 2020-05-05 ENCOUNTER — TELEPHONE (OUTPATIENT)
Dept: NEPHROLOGY | Facility: CLINIC | Age: 80
End: 2020-05-05

## 2020-05-05 DIAGNOSIS — I10 HYPERTENSION, UNSPECIFIED TYPE: Primary | ICD-10-CM

## 2020-05-29 RX ORDER — LOSARTAN POTASSIUM 100 MG/1
TABLET ORAL
Qty: 90 TABLET | Refills: 3 | Status: SHIPPED | OUTPATIENT
Start: 2020-05-29 | End: 2021-05-31

## 2020-06-10 ENCOUNTER — PATIENT OUTREACH (OUTPATIENT)
Dept: ADMINISTRATIVE | Facility: HOSPITAL | Age: 80
End: 2020-06-10

## 2020-07-02 ENCOUNTER — TELEPHONE (OUTPATIENT)
Dept: NEPHROLOGY | Facility: CLINIC | Age: 80
End: 2020-07-02

## 2020-07-06 ENCOUNTER — TELEPHONE (OUTPATIENT)
Dept: NEPHROLOGY | Facility: CLINIC | Age: 80
End: 2020-07-06

## 2020-07-06 NOTE — TELEPHONE ENCOUNTER
----- Message from Adele Alcantara sent at 7/6/2020 12:22 PM CDT -----  Regarding: Lab Appointments  Pt requesting a call back in regards to lab ; stated he's eaten today and he has additional questions / concerns       [phone] (home)

## 2020-07-07 ENCOUNTER — TELEPHONE (OUTPATIENT)
Dept: NEPHROLOGY | Facility: CLINIC | Age: 80
End: 2020-07-07

## 2020-07-07 ENCOUNTER — OFFICE VISIT (OUTPATIENT)
Dept: NEPHROLOGY | Facility: CLINIC | Age: 80
End: 2020-07-07
Payer: MEDICARE

## 2020-07-07 VITALS
WEIGHT: 239.88 LBS | SYSTOLIC BLOOD PRESSURE: 146 MMHG | OXYGEN SATURATION: 98 % | BODY MASS INDEX: 30.79 KG/M2 | HEART RATE: 84 BPM | DIASTOLIC BLOOD PRESSURE: 78 MMHG | HEIGHT: 74 IN

## 2020-07-07 DIAGNOSIS — I10 HTN (HYPERTENSION), BENIGN: Primary | ICD-10-CM

## 2020-07-07 DIAGNOSIS — N18.30 CKD (CHRONIC KIDNEY DISEASE) STAGE 3, GFR 30-59 ML/MIN: ICD-10-CM

## 2020-07-07 PROCEDURE — 99204 PR OFFICE/OUTPT VISIT, NEW, LEVL IV, 45-59 MIN: ICD-10-PCS | Mod: S$GLB,,, | Performed by: INTERNAL MEDICINE

## 2020-07-07 PROCEDURE — 1125F PR PAIN SEVERITY QUANTIFIED, PAIN PRESENT: ICD-10-PCS | Mod: S$GLB,,, | Performed by: INTERNAL MEDICINE

## 2020-07-07 PROCEDURE — 3077F PR MOST RECENT SYSTOLIC BLOOD PRESSURE >= 140 MM HG: ICD-10-PCS | Mod: CPTII,S$GLB,, | Performed by: INTERNAL MEDICINE

## 2020-07-07 PROCEDURE — 99499 RISK ADDL DX/OHS AUDIT: ICD-10-PCS | Mod: S$GLB,,, | Performed by: INTERNAL MEDICINE

## 2020-07-07 PROCEDURE — 1101F PT FALLS ASSESS-DOCD LE1/YR: CPT | Mod: CPTII,S$GLB,, | Performed by: INTERNAL MEDICINE

## 2020-07-07 PROCEDURE — 1159F PR MEDICATION LIST DOCUMENTED IN MEDICAL RECORD: ICD-10-PCS | Mod: S$GLB,,, | Performed by: INTERNAL MEDICINE

## 2020-07-07 PROCEDURE — 1125F AMNT PAIN NOTED PAIN PRSNT: CPT | Mod: S$GLB,,, | Performed by: INTERNAL MEDICINE

## 2020-07-07 PROCEDURE — 3077F SYST BP >= 140 MM HG: CPT | Mod: CPTII,S$GLB,, | Performed by: INTERNAL MEDICINE

## 2020-07-07 PROCEDURE — 3078F PR MOST RECENT DIASTOLIC BLOOD PRESSURE < 80 MM HG: ICD-10-PCS | Mod: CPTII,S$GLB,, | Performed by: INTERNAL MEDICINE

## 2020-07-07 PROCEDURE — 1159F MED LIST DOCD IN RCRD: CPT | Mod: S$GLB,,, | Performed by: INTERNAL MEDICINE

## 2020-07-07 PROCEDURE — 99204 OFFICE O/P NEW MOD 45 MIN: CPT | Mod: S$GLB,,, | Performed by: INTERNAL MEDICINE

## 2020-07-07 PROCEDURE — 99499 UNLISTED E&M SERVICE: CPT | Mod: S$GLB,,, | Performed by: INTERNAL MEDICINE

## 2020-07-07 PROCEDURE — 99999 PR PBB SHADOW E&M-EST. PATIENT-LVL III: ICD-10-PCS | Mod: PBBFAC,,, | Performed by: INTERNAL MEDICINE

## 2020-07-07 PROCEDURE — 99999 PR PBB SHADOW E&M-EST. PATIENT-LVL III: CPT | Mod: PBBFAC,,, | Performed by: INTERNAL MEDICINE

## 2020-07-07 PROCEDURE — 1101F PR PT FALLS ASSESS DOC 0-1 FALLS W/OUT INJ PAST YR: ICD-10-PCS | Mod: CPTII,S$GLB,, | Performed by: INTERNAL MEDICINE

## 2020-07-07 PROCEDURE — 3078F DIAST BP <80 MM HG: CPT | Mod: CPTII,S$GLB,, | Performed by: INTERNAL MEDICINE

## 2020-07-07 RX ORDER — SODIUM BICARBONATE 325 MG/1
650 TABLET ORAL 2 TIMES DAILY
Qty: 120 TABLET | Refills: 6 | COMMUNITY
Start: 2020-07-07 | End: 2021-03-09

## 2020-07-07 NOTE — LETTER
July 7, 2020      Moe Gallardo MD  [address]  [address]  [facility] [address]           [facility] Nephrology  [address]  [facility] [address]  Phone: [phone]  Fax: [phone]          Patient: Tip Hurst   MR Number: [id number]   YOB: 1940   Date of Visit: 7/7/2020       Dear Dr. Moe Gallardo:    Thank you for referring Tip Hurst to me for evaluation. Attached you will find relevant portions of my assessment and plan of care.    If you have questions, please do not hesitate to call me. I look forward to following Tip Hurst along with you.    Sincerely,    Natalia Morales MD    Enclosure  CC:  No Recipients    If you would like to receive this communication electronically, please contact [email or web address] or [phone] to request more information on [employer] Link access.    For providers and/or their staff who would like to refer a patient to [facility], please contact us through our one-stop-shop provider referral line, [facility], at [phone].    If you feel you have received this communication in error or would no longer like to receive these types of communications, please e-mail [email or web address]

## 2020-07-07 NOTE — PROGRESS NOTES
REASON FOR CONSULT: ckd    REFERRING PHYSICIAN: Moe Gallardo MD      HISTORY OF PRESENT ILLNESS: 79 y.o. male who is new to me  has a past medical history of Hyperlipidemia and Hypertension. patient was referred here for abnormal renal function. His cr levels have been stable for the last few years, he has HTN for many years and used to be smoker in the past, also, he was on Meloxicam for arthritis for many years before stopped in around 2015. Early 2020 he was diagnosed with monoclonal gammopathy of IgM Lambda. Patient feels well today, no urinary or cardiac symptoms, no NSAIDs intake.       ROS:  General: negative for chills, or fatigue  ENT: No epistaxis or headaches  Hematological and Lymphatic: No bleeding problems or blood clots.  Endocrine: No skin changes or temperature intolerance  Respiratory: No cough, shortness of breath, or wheezing  Cardiovascular: No chest pain or dyspnea   Gastrointestinal: No abdominal pain, change in bowel habits  Genito-Urinary: No dysuria, trouble voiding, or hematuria  Musculoskeletal: ROS: negative for - joint pain, joint stiffness, joint swelling, muscle pain or muscular weakness  Neurological: No focal weakness, no numbness  Dermatological: No rash or ulcers.    PAST MEDICAL HISTORY:  Past Medical History:   Diagnosis Date    Hyperlipidemia     Hypertension        PAST SURGICAL HISTORY:  Past Surgical History:   Procedure Laterality Date    LEG SURGERY      PROSTATE SURGERY         FAMILY HISTORY:   Family History   Problem Relation Age of Onset    Diabetes Sister     Heart disease Sister     Aneurysm Brother     Cancer Brother        SOCIAL HISTORY:  Social History     Socioeconomic History    Marital status:      Spouse name: Not on file    Number of children: Not on file    Years of education: Not on file    Highest education level: Not on file   Occupational History    Not on file   Social Needs    Financial resource strain: Not on file     Food insecurity     Worry: Not on file     Inability: Not on file    Transportation needs     Medical: Not on file     Non-medical: Not on file   Tobacco Use    Smoking status: Never Smoker    Smokeless tobacco: Never Used   Substance and Sexual Activity    Alcohol use: No    Drug use: No    Sexual activity: Never   Lifestyle    Physical activity     Days per week: Not on file     Minutes per session: Not on file    Stress: Not on file   Relationships    Social connections     Talks on phone: Not on file     Gets together: Not on file     Attends [employer] service: Not on file     Active member of club or organization: Not on file     Attends meetings of clubs or organizations: Not on file     Relationship status: Not on file   Other Topics Concern    Not on file   Social History Narrative    Not on file       ALLERGIES:  Review of patient's allergies indicates:  No Known Allergies    MEDICATIONS:    Current Outpatient Medications:     allopurinol (ZYLOPRIM) 100 MG tablet, Take 1 tablet (100 mg total) by mouth once daily., Disp: 90 tablet, Rfl: 0    cholecalciferol, vitamin D3, 50,000 unit capsule, Take 1 capsule (50,000 Units total) by mouth once a week., Disp: 12 capsule, Rfl: 3    losartan (COZAAR) 100 MG tablet, TAKE 1 TABLET BY MOUTH EVERY DAY, Disp: 90 tablet, Rfl: 3    NIFEdipine (PROCARDIA-XL) 60 MG (OSM) 24 hr tablet, Take 1 tablet (60 mg total) by mouth once daily., Disp: 90 tablet, Rfl: 1    simvastatin (ZOCOR) 40 MG tablet, TAKE 1 TABLET BY MOUTH EVERY DAY IN THE EVENING, Disp: 90 tablet, Rfl: 3   Medication List with Changes/Refills   Current Medications    ALLOPURINOL (ZYLOPRIM) 100 MG TABLET    Take 1 tablet (100 mg total) by mouth once daily.    CHOLECALCIFEROL, VITAMIN D3, 50,000 UNIT CAPSULE    Take 1 capsule (50,000 Units total) by mouth once a week.    LOSARTAN (COZAAR) 100 MG TABLET    TAKE 1 TABLET BY MOUTH EVERY DAY    NIFEDIPINE (PROCARDIA-XL) 60 MG (OSM) 24 HR TABLET    Take  "1 tablet (60 mg total) by mouth once daily.    SIMVASTATIN (ZOCOR) 40 MG TABLET    TAKE 1 TABLET BY MOUTH EVERY DAY IN THE EVENING        PHYSICAL EXAM:  BP (!) 146/78   Pulse 84   Ht 6' 2" (1.88 m)   Wt 108.8 kg (239 lb 13.8 oz)   SpO2 98%   BMI 30.80 kg/m²     General: No distress, No fever or chills  Head: Normocephalic,atraumatic  Eyes: conjunctivae/corneas clear. PERRL, EOM's intact.  Nose: Nares normal. Mucosa normal. No drainage or sinus tenderness.  Neck: No adenopathy,no carotid bruit,no JVD  Lungs:Clear to auscultation bilaterally, No Crackles  Heart: Regular rate and rhythm, no murmur, gallops or rubs  Abdomen: Soft, no tenderness, bowel sounds normal  Extremities: Atraumatic, 1+ edema in LE  Skin: Turgor normal. No rashes or ulcers  Neurologic: No focal weakness, oriented.  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 Recent Monoclonal gammopathy (IgM Lambda) is less likely to be contributing.  -Cr baseline ~ 1.5-1.6, eGFR ~ 45 ml/min, stable  -UPCR 900 mg/g, ? accurate collection  -Renal US ckd    HTN  Controlled on Nifedipine and Losartan    Anemia  -from ckd  -Hgb goal ~ 10  -Iron stores not available    Secondary Hyperparathyroidism  -Phos/Ca acceptable  -PTH na  -Vit D na    Acid/Base  -Metabolic acidosis                PLAN:  -Start NaHco3  -Continue current " BP meds regimen  -Avoid NSAIDs intake      RTC in 6 months with labs      Thanks for allowing me to participate in the care of this patient.     2:10 PM    KATIANA MIDDLETON MD  NEPHROLOGY ATTENDING

## 2020-07-21 ENCOUNTER — TELEPHONE (OUTPATIENT)
Dept: HEMATOLOGY/ONCOLOGY | Facility: CLINIC | Age: 80
End: 2020-07-21

## 2020-07-21 NOTE — TELEPHONE ENCOUNTER
----- Message from Deborah De Santiago sent at 7/21/2020  6:49 AM CDT -----  Regarding: Appointment Change  Appointment Access:    Pt called to speak to the nurse to cancel/reschedule his appt today due to a food virus.    Pt can be reached at [phone]

## 2020-07-21 NOTE — TELEPHONE ENCOUNTER
----- Message from Shakeel Buenrostro sent at 7/21/2020  9:32 AM CDT -----  Contact: Patient  Returning a call     Caller name:: Pt    Who Left Message for Patient:: Jean Marie     Communication preference:: [phone]    Additional info::

## 2020-07-21 NOTE — TELEPHONE ENCOUNTER
----- Message from Shakeel Buenrostro sent at 7/21/2020  9:32 AM CDT -----  Contact: Patient  Returning a call     Caller name:: Pt    Who Left Message for Patient:: Jean Marie     Communication preference:: [phone]    Additional info::

## 2020-08-07 ENCOUNTER — TELEPHONE (OUTPATIENT)
Dept: HEMATOLOGY/ONCOLOGY | Facility: CLINIC | Age: 80
End: 2020-08-07

## 2020-08-07 NOTE — TELEPHONE ENCOUNTER
Rescheduled apts per pt request. Confirmed apts with pt wife. No other questions or concerns voiced at this time.

## 2020-08-07 NOTE — TELEPHONE ENCOUNTER
----- Message from Felicia Yang sent at 8/7/2020  8:01 AM CDT -----  Patient called to reschedule an earlier appointment specifically with Dr Holliday  And wishes to speak with a nurse regarding this matter.          can be reached at [phone] or [phone]    Thanks  KB

## 2020-08-11 ENCOUNTER — LAB VISIT (OUTPATIENT)
Dept: LAB | Facility: HOSPITAL | Age: 80
End: 2020-08-11
Payer: MEDICARE

## 2020-08-11 DIAGNOSIS — D47.2 IGM MONOCLONAL GAMMOPATHY OF UNCERTAIN SIGNIFICANCE: ICD-10-CM

## 2020-08-11 DIAGNOSIS — D47.2 IGM LAMBDA MONOCLONAL GAMMOPATHY: ICD-10-CM

## 2020-08-11 LAB
ALBUMIN SERPL BCP-MCNC: 4 G/DL (ref 3.5–5.2)
ALP SERPL-CCNC: 136 U/L (ref 55–135)
ALT SERPL W/O P-5'-P-CCNC: 25 U/L (ref 10–44)
ANION GAP SERPL CALC-SCNC: 10 MMOL/L (ref 8–16)
AST SERPL-CCNC: 19 U/L (ref 10–40)
BASOPHILS # BLD AUTO: 0.07 K/UL (ref 0–0.2)
BASOPHILS NFR BLD: 0.8 % (ref 0–1.9)
BILIRUB SERPL-MCNC: 0.5 MG/DL (ref 0.1–1)
BUN SERPL-MCNC: 25 MG/DL (ref 8–23)
CALCIUM SERPL-MCNC: 10 MG/DL (ref 8.7–10.5)
CHLORIDE SERPL-SCNC: 107 MMOL/L (ref 95–110)
CO2 SERPL-SCNC: 21 MMOL/L (ref 23–29)
CREAT SERPL-MCNC: 1.8 MG/DL (ref 0.5–1.4)
DIFFERENTIAL METHOD: ABNORMAL
EOSINOPHIL # BLD AUTO: 0.4 K/UL (ref 0–0.5)
EOSINOPHIL NFR BLD: 4.9 % (ref 0–8)
ERYTHROCYTE [DISTWIDTH] IN BLOOD BY AUTOMATED COUNT: 13.2 % (ref 11.5–14.5)
EST. GFR  (AFRICAN AMERICAN): 39.9 ML/MIN/1.73 M^2
EST. GFR  (NON AFRICAN AMERICAN): 34.5 ML/MIN/1.73 M^2
GLUCOSE SERPL-MCNC: 143 MG/DL (ref 70–110)
HCT VFR BLD AUTO: 37 % (ref 40–54)
HGB BLD-MCNC: 11.6 G/DL (ref 14–18)
IGA SERPL-MCNC: 305 MG/DL (ref 40–350)
IGG SERPL-MCNC: 1091 MG/DL (ref 650–1600)
IGM SERPL-MCNC: 1247 MG/DL (ref 50–300)
IMM GRANULOCYTES # BLD AUTO: 0.03 K/UL (ref 0–0.04)
IMM GRANULOCYTES NFR BLD AUTO: 0.4 % (ref 0–0.5)
LYMPHOCYTES # BLD AUTO: 3 K/UL (ref 1–4.8)
LYMPHOCYTES NFR BLD: 36 % (ref 18–48)
MCH RBC QN AUTO: 29 PG (ref 27–31)
MCHC RBC AUTO-ENTMCNC: 31.4 G/DL (ref 32–36)
MCV RBC AUTO: 93 FL (ref 82–98)
MONOCYTES # BLD AUTO: 0.7 K/UL (ref 0.3–1)
MONOCYTES NFR BLD: 8.3 % (ref 4–15)
NEUTROPHILS # BLD AUTO: 4.2 K/UL (ref 1.8–7.7)
NEUTROPHILS NFR BLD: 49.6 % (ref 38–73)
NRBC BLD-RTO: 0 /100 WBC
PLATELET # BLD AUTO: 384 K/UL (ref 150–350)
PMV BLD AUTO: 9.2 FL (ref 9.2–12.9)
POTASSIUM SERPL-SCNC: 4.7 MMOL/L (ref 3.5–5.1)
PROT SERPL-MCNC: 8.7 G/DL (ref 6–8.4)
RBC # BLD AUTO: 4 M/UL (ref 4.6–6.2)
SODIUM SERPL-SCNC: 138 MMOL/L (ref 136–145)
WBC # BLD AUTO: 8.36 K/UL (ref 3.9–12.7)

## 2020-08-11 PROCEDURE — 86334 IMMUNOFIX E-PHORESIS SERUM: CPT

## 2020-08-11 PROCEDURE — 85025 COMPLETE CBC W/AUTO DIFF WBC: CPT

## 2020-08-11 PROCEDURE — 86334 PATHOLOGIST INTERPRETATION IFE: ICD-10-PCS | Mod: 26,,, | Performed by: PATHOLOGY

## 2020-08-11 PROCEDURE — 84165 PATHOLOGIST INTERPRETATION SPE: ICD-10-PCS | Mod: 26,,, | Performed by: PATHOLOGY

## 2020-08-11 PROCEDURE — 36415 COLL VENOUS BLD VENIPUNCTURE: CPT

## 2020-08-11 PROCEDURE — 80053 COMPREHEN METABOLIC PANEL: CPT

## 2020-08-11 PROCEDURE — 86334 IMMUNOFIX E-PHORESIS SERUM: CPT | Mod: 26,,, | Performed by: PATHOLOGY

## 2020-08-11 PROCEDURE — 84165 PROTEIN E-PHORESIS SERUM: CPT | Mod: 26,,, | Performed by: PATHOLOGY

## 2020-08-11 PROCEDURE — 82784 ASSAY IGA/IGD/IGG/IGM EACH: CPT | Mod: 59

## 2020-08-11 PROCEDURE — 84165 PROTEIN E-PHORESIS SERUM: CPT

## 2020-08-11 PROCEDURE — 83520 IMMUNOASSAY QUANT NOS NONAB: CPT

## 2020-08-12 ENCOUNTER — OFFICE VISIT (OUTPATIENT)
Dept: HEMATOLOGY/ONCOLOGY | Facility: CLINIC | Age: 80
End: 2020-08-12
Payer: MEDICARE

## 2020-08-12 VITALS
BODY MASS INDEX: 30.48 KG/M2 | OXYGEN SATURATION: 97 % | RESPIRATION RATE: 16 BRPM | DIASTOLIC BLOOD PRESSURE: 70 MMHG | WEIGHT: 237.38 LBS | TEMPERATURE: 98 F | SYSTOLIC BLOOD PRESSURE: 161 MMHG | HEART RATE: 81 BPM

## 2020-08-12 DIAGNOSIS — D47.2 MONOCLONAL GAMMOPATHY OF UNDETERMINED SIGNIFICANCE: Primary | ICD-10-CM

## 2020-08-12 DIAGNOSIS — N32.89 BLADDER MASS: ICD-10-CM

## 2020-08-12 LAB
ALBUMIN SERPL ELPH-MCNC: 4.16 G/DL (ref 3.35–5.55)
ALPHA1 GLOB SERPL ELPH-MCNC: 0.33 G/DL (ref 0.17–0.41)
ALPHA2 GLOB SERPL ELPH-MCNC: 1.14 G/DL (ref 0.43–0.99)
B-GLOBULIN SERPL ELPH-MCNC: 0.94 G/DL (ref 0.5–1.1)
GAMMA GLOB SERPL ELPH-MCNC: 1.73 G/DL (ref 0.67–1.58)
INTERPRETATION SERPL IFE-IMP: NORMAL
KAPPA LC SER QL IA: 3.67 MG/DL (ref 0.33–1.94)
KAPPA LC/LAMBDA SER IA: 0.21 (ref 0.26–1.65)
LAMBDA LC SER QL IA: 17.54 MG/DL (ref 0.57–2.63)
PATHOLOGIST INTERPRETATION IFE: NORMAL
PATHOLOGIST INTERPRETATION SPE: NORMAL
PROT SERPL-MCNC: 8.3 G/DL (ref 6–8.4)

## 2020-08-12 PROCEDURE — 1125F PR PAIN SEVERITY QUANTIFIED, PAIN PRESENT: ICD-10-PCS | Mod: S$GLB,,, | Performed by: INTERNAL MEDICINE

## 2020-08-12 PROCEDURE — 99214 OFFICE O/P EST MOD 30 MIN: CPT | Mod: S$GLB,,, | Performed by: INTERNAL MEDICINE

## 2020-08-12 PROCEDURE — 3078F DIAST BP <80 MM HG: CPT | Mod: CPTII,S$GLB,, | Performed by: INTERNAL MEDICINE

## 2020-08-12 PROCEDURE — 1159F PR MEDICATION LIST DOCUMENTED IN MEDICAL RECORD: ICD-10-PCS | Mod: S$GLB,,, | Performed by: INTERNAL MEDICINE

## 2020-08-12 PROCEDURE — 99214 PR OFFICE/OUTPT VISIT, EST, LEVL IV, 30-39 MIN: ICD-10-PCS | Mod: S$GLB,,, | Performed by: INTERNAL MEDICINE

## 2020-08-12 PROCEDURE — 1101F PT FALLS ASSESS-DOCD LE1/YR: CPT | Mod: CPTII,S$GLB,, | Performed by: INTERNAL MEDICINE

## 2020-08-12 PROCEDURE — 1125F AMNT PAIN NOTED PAIN PRSNT: CPT | Mod: S$GLB,,, | Performed by: INTERNAL MEDICINE

## 2020-08-12 PROCEDURE — 99499 RISK ADDL DX/OHS AUDIT: ICD-10-PCS | Mod: S$GLB,,, | Performed by: INTERNAL MEDICINE

## 2020-08-12 PROCEDURE — 1101F PR PT FALLS ASSESS DOC 0-1 FALLS W/OUT INJ PAST YR: ICD-10-PCS | Mod: CPTII,S$GLB,, | Performed by: INTERNAL MEDICINE

## 2020-08-12 PROCEDURE — 1159F MED LIST DOCD IN RCRD: CPT | Mod: S$GLB,,, | Performed by: INTERNAL MEDICINE

## 2020-08-12 PROCEDURE — 3078F PR MOST RECENT DIASTOLIC BLOOD PRESSURE < 80 MM HG: ICD-10-PCS | Mod: CPTII,S$GLB,, | Performed by: INTERNAL MEDICINE

## 2020-08-12 PROCEDURE — 3077F PR MOST RECENT SYSTOLIC BLOOD PRESSURE >= 140 MM HG: ICD-10-PCS | Mod: CPTII,S$GLB,, | Performed by: INTERNAL MEDICINE

## 2020-08-12 PROCEDURE — 99999 PR PBB SHADOW E&M-EST. PATIENT-LVL III: CPT | Mod: PBBFAC,,, | Performed by: INTERNAL MEDICINE

## 2020-08-12 PROCEDURE — 99999 PR PBB SHADOW E&M-EST. PATIENT-LVL III: ICD-10-PCS | Mod: PBBFAC,,, | Performed by: INTERNAL MEDICINE

## 2020-08-12 PROCEDURE — 3077F SYST BP >= 140 MM HG: CPT | Mod: CPTII,S$GLB,, | Performed by: INTERNAL MEDICINE

## 2020-08-12 PROCEDURE — 99499 UNLISTED E&M SERVICE: CPT | Mod: S$GLB,,, | Performed by: INTERNAL MEDICINE

## 2020-08-12 NOTE — PROGRESS NOTES
"   SECTION OF HEMATOLOGY AND BONE MARROW TRANSPLANT  Return Patient Visit   08/17/2020    CHIEF COMPLAINT: No chief complaint on file.      HISTORY OF PRESENT ILLNESS:     Mr Hurst is an 79 y.o. 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 He has chronic anemia with Hb in the 12s range for at least 9 years. CBC on 1/6/2020 showed WBC 6.49, Hb 12.5, plt count 367. CMP showed creatinine of 1.6 (chronic), calcium normal 9.9, Protein 8.7, albumin 3.8. workup for the protein gap included an SPEP showing a monoclonal protein peak in gamma 1.07 g/dL. IFX showed IgM lambda monoclonal band. UPEP is negative for paraprotein bands. HIV, hep B and Hep C negative. at that time denied  weight loss, malaise, lightheadedness, dizziness, change in vision, headaches, fatigue. Chronic arthritic pain in hips and knees has recently improved.   2. Labs on 2/11/20 showed IgM 1214. Lambda 13.38, kappa 3.57, L/K ratio 3.75  3. CT C/A/P on 2/18/20 showed "Low-density bladder wall thickening near the dome of the urinary bladder measuring approximately 1.9 x 1.7 x 2.7 cm in size.  Further evaluation with cystoscopy is recommended. Fatty changes of the liver. No adenopathy identified. Prominent coronary atherosclerosis. 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 Patient did not show for BMBx appointment on 2/20/20."    Attempted to contact pt multiple times for telemed appt April 2020 with so success however presents in person today.      No change in clinical status since our last appt.      PAST MEDICAL HISTORY:   Past Medical History:   Diagnosis Date    Hyperlipidemia     Hypertension        PAST SURGICAL HISTORY:   Past Surgical History:   Procedure Laterality Date    LEG SURGERY      PROSTATE SURGERY         PAST SOCIAL HISTORY:   reports that he has never smoked. He has never used smokeless tobacco. He reports that he " does not drink alcohol or use drugs.    FAMILY HISTORY:  Family History   Problem Relation Age of Onset    Diabetes Sister     Heart disease Sister     Aneurysm Brother     Cancer Brother        CURRENT MEDICATIONS:   Current Outpatient Medications   Medication Sig    allopurinoL (ZYLOPRIM) 100 MG tablet TAKE 1 TABLET BY MOUTH EVERY DAY    cholecalciferol, vitamin D3, 50,000 unit capsule Take 1 capsule (50,000 Units total) by mouth once a week.    losartan (COZAAR) 100 MG tablet TAKE 1 TABLET BY MOUTH EVERY DAY    NIFEdipine (PROCARDIA-XL) 60 MG (OSM) 24 hr tablet TAKE 1 TABLET BY MOUTH ONCE DAILY    simvastatin (ZOCOR) 40 MG tablet TAKE 1 TABLET BY MOUTH EVERY DAY IN THE EVENING    sodium bicarbonate 325 MG tablet Take 2 tablets (650 mg total) by mouth 2 (two) times daily.     No current facility-administered medications for this visit.      ALLERGIES:   Review of patient's allergies indicates:  No Known Allergies        REVIEW OF SYSTEMS:   Review of Systems - see HPI    PHYSICAL EXAM:   Vitals:    08/12/20 0706   BP: (!) 161/70   Pulse: 81   Resp: 16   Temp: 98.2 °F (36.8 °C)   TempSrc: Oral   SpO2: 97%   Weight: 107.7 kg (237 lb 6.4 oz)   PainSc:   5   PainLoc: Knee     General - well developed, well nourished, no apparent distress  HEENT - oropharynx clear  Chest and Lung - clear to auscultation bilaterally   Cardiovascular - RRR with no MGR, normal S1 and S2  Abdomen-  soft, nontender, no palpable hepatomegaly or splenomegaly  Lymph - no palpable lymphadenopathy  Heme - no bruising, petechiae, pallor  Skin - no rashes or lesions  Psych - appropriate mood and affect      ECOG Performance Status: (foot note - ECOG PS provided by Eastern Cooperative Oncology Group) 1 - Symptomatic but completely ambulatory    Karnofsky Performance Score:  90%- Able to Carry on Normal Activity: Minor Symptoms of Disease  DATA:   Lab Results   Component Value Date    WBC 8.36 08/11/2020    HGB 11.6 (L) 08/11/2020    HCT  37.0 (L) 08/11/2020    MCV 93 08/11/2020     (H) 08/11/2020       Gran # (ANC)   Date Value Ref Range Status   08/11/2020 4.2 1.8 - 7.7 K/uL Final     Gran%   Date Value Ref Range Status   08/11/2020 49.6 38.0 - 73.0 % Final     CMP  Sodium   Date Value Ref Range Status   08/11/2020 138 136 - 145 mmol/L Final     Potassium   Date Value Ref Range Status   08/11/2020 4.7 3.5 - 5.1 mmol/L Final     Chloride   Date Value Ref Range Status   08/11/2020 107 95 - 110 mmol/L Final     CO2   Date Value Ref Range Status   08/11/2020 21 (L) 23 - 29 mmol/L Final     Glucose   Date Value Ref Range Status   08/11/2020 143 (H) 70 - 110 mg/dL Final     BUN, Bld   Date Value Ref Range Status   08/11/2020 25 (H) 8 - 23 mg/dL Final     Creatinine   Date Value Ref Range Status   08/11/2020 1.8 (H) 0.5 - 1.4 mg/dL Final     Calcium   Date Value Ref Range Status   08/11/2020 10.0 8.7 - 10.5 mg/dL Final     Total Protein   Date Value Ref Range Status   08/11/2020 8.7 (H) 6.0 - 8.4 g/dL Final     Albumin   Date Value Ref Range Status   08/11/2020 4.0 3.5 - 5.2 g/dL Final     Total Bilirubin   Date Value Ref Range Status   08/11/2020 0.5 0.1 - 1.0 mg/dL Final     Comment:     For infants and newborns, interpretation of results should be based  on gestational age, weight and in agreement with clinical  observations.  Premature Infant recommended reference ranges:  Up to 24 hours.............<8.0 mg/dL  Up to 48 hours............<12.0 mg/dL  3-5 days..................<15.0 mg/dL  6-29 days.................<15.0 mg/dL       Alkaline Phosphatase   Date Value Ref Range Status   08/11/2020 136 (H) 55 - 135 U/L Final     AST   Date Value Ref Range Status   08/11/2020 19 10 - 40 U/L Final     ALT   Date Value Ref Range Status   08/11/2020 25 10 - 44 U/L Final     Anion Gap   Date Value Ref Range Status   08/11/2020 10 8 - 16 mmol/L Final     eGFR if    Date Value Ref Range Status   08/11/2020 39.9 (A) >60 mL/min/1.73 m^2 Final      eGFR if non    Date Value Ref Range Status   08/11/2020 34.5 (A) >60 mL/min/1.73 m^2 Final     Comment:     Calculation used to obtain the estimated glomerular filtration  rate (eGFR) is the CKD-EPI equation.        IgG - Serum   Date Value Ref Range Status   08/11/2020 1091 650 - 1600 mg/dL Final     Comment:     IgG Cord Blood Reference Range: 650-1600 mg/dL.     IgA   Date Value Ref Range Status   08/11/2020 305 40 - 350 mg/dL Final     Comment:     IgA Cord Blood Reference Range: <5 mg/dL.     IgM   Date Value Ref Range Status   08/11/2020 1247 (H) 50 - 300 mg/dL Final     Comment:     IgM Cord Blood Reference Range: <25 mg/dL.     Kappa Free Light Chains   Date Value Ref Range Status   08/11/2020 3.67 (H) 0.33 - 1.94 mg/dL Final   04/03/2020 3.39 (H) 0.33 - 1.94 mg/dL Final   02/11/2020 3.57 (H) 0.33 - 1.94 mg/dL Final     Lambda Free Light Chains   Date Value Ref Range Status   08/11/2020 17.54 (H) 0.57 - 2.63 mg/dL Final   04/03/2020 13.09 (H) 0.57 - 2.63 mg/dL Final   02/11/2020 13.38 (H) 0.57 - 2.63 mg/dL Final     Kappa/Lambda FLC Ratio   Date Value Ref Range Status   08/11/2020 0.21 (L) 0.26 - 1.65 Final   04/03/2020 0.26 0.26 - 1.65 Final   02/11/2020 0.27 0.26 - 1.65 Final     Pathologist Interpretation SPE  Pathologist Interpretation SPE  Collected: 08/11/20 0741   Result status: Final   Resulting lab: [facility]   Value: REVIEWED   Comment: Electronically reviewed and signed by:   Jillian Ortiz D.O.   Signed on 08/12/20 at 16:10   Normal total protein. There is a paraprotein band in near-gamma =   0.95 g/dL (previously, 1.07 g/dL)   Interpreted by Jillian Ortiz D.O.    *Additional information available - comment           ASSESSMENT AND PLAN:   Encounter Diagnoses   Name Primary?    Monoclonal gammopathy of undetermined significance Yes    Bladder mass        -ct c/a/p feb 2020 notable for no adenopathy/HSM; noted small bladder mass; ct not done with  contrast so of limited utility in assessing Lymphoproliferative disorder; consider pet if symptoms develop  -no showed to march 2020 bone marrow biopsy;    -asymptomatic; IgM level stable from feb to April 2020  --Normal sflcr and paraprotein <1.5   -Ok to defer more imaging and marrow  In light no clinical or biochemical progression, advanced age and pts wishes   IgM MGUS   ckd from htn, pad  Mild anemnia stable from >10 yrs likely from ckd   In  Light of age, comorbidites and no overt progression can defer marrow and recommend fu in 6 months (vs annualy since no marrow)  24 hr urine studies 2020 with no paraprotein and moderate proteinura   Certainly cuold have low grade Lymphoproliferative disorder but given asmptomatic/stable labs would likely monitor anyway     Bladder mass   -noted on feb 2020 CT; has had urology fu     Anemia  -Baseline stable  -Likely anemia of ckd  -nutritonal eval normal     Follow Up: .cbc, cmp, serum free light chains, quantitative immunoglobulins, serum electropheresis, serum immunofixation, and np appt in 6 months   Tony Holliday MD  Hematology/Oncology/Bone Marrow Transplant

## 2020-08-12 NOTE — Clinical Note
cbc, cmp, serum free light chains, quantitative immunoglobulins, serum electropheresis, serum immunofixation and NP appt in 6 months

## 2021-03-09 ENCOUNTER — OFFICE VISIT (OUTPATIENT)
Dept: HEMATOLOGY/ONCOLOGY | Facility: CLINIC | Age: 81
End: 2021-03-09
Payer: MEDICARE

## 2021-03-09 ENCOUNTER — LAB VISIT (OUTPATIENT)
Dept: LAB | Facility: HOSPITAL | Age: 81
End: 2021-03-09
Payer: MEDICARE

## 2021-03-09 VITALS
HEIGHT: 74 IN | TEMPERATURE: 98 F | BODY MASS INDEX: 31.59 KG/M2 | WEIGHT: 246.13 LBS | SYSTOLIC BLOOD PRESSURE: 192 MMHG | DIASTOLIC BLOOD PRESSURE: 88 MMHG | RESPIRATION RATE: 12 BRPM | HEART RATE: 78 BPM | OXYGEN SATURATION: 97 %

## 2021-03-09 DIAGNOSIS — N32.9 LESION OF BLADDER: ICD-10-CM

## 2021-03-09 DIAGNOSIS — D63.0 ANEMIA IN NEOPLASTIC DISEASE: ICD-10-CM

## 2021-03-09 DIAGNOSIS — D47.2 MGUS (MONOCLONAL GAMMOPATHY OF UNKNOWN SIGNIFICANCE): ICD-10-CM

## 2021-03-09 DIAGNOSIS — D47.2 MGUS (MONOCLONAL GAMMOPATHY OF UNKNOWN SIGNIFICANCE): Primary | ICD-10-CM

## 2021-03-09 LAB
ALBUMIN SERPL BCP-MCNC: 3.9 G/DL (ref 3.5–5.2)
ALP SERPL-CCNC: 147 U/L (ref 55–135)
ALT SERPL W/O P-5'-P-CCNC: 34 U/L (ref 10–44)
ANION GAP SERPL CALC-SCNC: 9 MMOL/L (ref 8–16)
AST SERPL-CCNC: 25 U/L (ref 10–40)
BASOPHILS # BLD AUTO: 0.08 K/UL (ref 0–0.2)
BASOPHILS NFR BLD: 1 % (ref 0–1.9)
BILIRUB SERPL-MCNC: 0.5 MG/DL (ref 0.1–1)
BUN SERPL-MCNC: 17 MG/DL (ref 8–23)
CALCIUM SERPL-MCNC: 10.1 MG/DL (ref 8.7–10.5)
CHLORIDE SERPL-SCNC: 104 MMOL/L (ref 95–110)
CO2 SERPL-SCNC: 25 MMOL/L (ref 23–29)
CREAT SERPL-MCNC: 1.6 MG/DL (ref 0.5–1.4)
DIFFERENTIAL METHOD: ABNORMAL
EOSINOPHIL # BLD AUTO: 0.3 K/UL (ref 0–0.5)
EOSINOPHIL NFR BLD: 3.4 % (ref 0–8)
ERYTHROCYTE [DISTWIDTH] IN BLOOD BY AUTOMATED COUNT: 13.1 % (ref 11.5–14.5)
EST. GFR  (AFRICAN AMERICAN): 45.7 ML/MIN/1.73 M^2
EST. GFR  (NON AFRICAN AMERICAN): 39.5 ML/MIN/1.73 M^2
GLUCOSE SERPL-MCNC: 131 MG/DL (ref 70–110)
HCT VFR BLD AUTO: 38.8 % (ref 40–54)
HGB BLD-MCNC: 12.1 G/DL (ref 14–18)
IGA SERPL-MCNC: 293 MG/DL (ref 40–350)
IGG SERPL-MCNC: 1079 MG/DL (ref 650–1600)
IGM SERPL-MCNC: 1307 MG/DL (ref 50–300)
IMM GRANULOCYTES # BLD AUTO: 0.02 K/UL (ref 0–0.04)
IMM GRANULOCYTES NFR BLD AUTO: 0.3 % (ref 0–0.5)
LYMPHOCYTES # BLD AUTO: 2.8 K/UL (ref 1–4.8)
LYMPHOCYTES NFR BLD: 35.3 % (ref 18–48)
MCH RBC QN AUTO: 28.1 PG (ref 27–31)
MCHC RBC AUTO-ENTMCNC: 31.2 G/DL (ref 32–36)
MCV RBC AUTO: 90 FL (ref 82–98)
MONOCYTES # BLD AUTO: 0.7 K/UL (ref 0.3–1)
MONOCYTES NFR BLD: 8.7 % (ref 4–15)
NEUTROPHILS # BLD AUTO: 4 K/UL (ref 1.8–7.7)
NEUTROPHILS NFR BLD: 51.3 % (ref 38–73)
NRBC BLD-RTO: 0 /100 WBC
PLATELET # BLD AUTO: 359 K/UL (ref 150–350)
PMV BLD AUTO: 9.5 FL (ref 9.2–12.9)
POTASSIUM SERPL-SCNC: 4.6 MMOL/L (ref 3.5–5.1)
PROT SERPL-MCNC: 9 G/DL (ref 6–8.4)
RBC # BLD AUTO: 4.31 M/UL (ref 4.6–6.2)
SODIUM SERPL-SCNC: 138 MMOL/L (ref 136–145)
WBC # BLD AUTO: 7.84 K/UL (ref 3.9–12.7)

## 2021-03-09 PROCEDURE — 1101F PT FALLS ASSESS-DOCD LE1/YR: CPT | Mod: CPTII,S$GLB,, | Performed by: NURSE PRACTITIONER

## 2021-03-09 PROCEDURE — 1126F PR PAIN SEVERITY QUANTIFIED, NO PAIN PRESENT: ICD-10-PCS | Mod: S$GLB,,, | Performed by: NURSE PRACTITIONER

## 2021-03-09 PROCEDURE — 84165 PROTEIN E-PHORESIS SERUM: CPT | Performed by: INTERNAL MEDICINE

## 2021-03-09 PROCEDURE — 84165 PROTEIN E-PHORESIS SERUM: CPT | Mod: 26,,, | Performed by: PATHOLOGY

## 2021-03-09 PROCEDURE — 1159F PR MEDICATION LIST DOCUMENTED IN MEDICAL RECORD: ICD-10-PCS | Mod: S$GLB,,, | Performed by: NURSE PRACTITIONER

## 2021-03-09 PROCEDURE — 99999 PR PBB SHADOW E&M-EST. PATIENT-LVL III: ICD-10-PCS | Mod: PBBFAC,,, | Performed by: NURSE PRACTITIONER

## 2021-03-09 PROCEDURE — 82784 ASSAY IGA/IGD/IGG/IGM EACH: CPT | Mod: 59 | Performed by: INTERNAL MEDICINE

## 2021-03-09 PROCEDURE — 83520 IMMUNOASSAY QUANT NOS NONAB: CPT | Mod: 59 | Performed by: INTERNAL MEDICINE

## 2021-03-09 PROCEDURE — 84165 PATHOLOGIST INTERPRETATION SPE: ICD-10-PCS | Mod: 26,,, | Performed by: PATHOLOGY

## 2021-03-09 PROCEDURE — 1159F MED LIST DOCD IN RCRD: CPT | Mod: S$GLB,,, | Performed by: NURSE PRACTITIONER

## 2021-03-09 PROCEDURE — 85025 COMPLETE CBC W/AUTO DIFF WBC: CPT | Performed by: INTERNAL MEDICINE

## 2021-03-09 PROCEDURE — 3079F PR MOST RECENT DIASTOLIC BLOOD PRESSURE 80-89 MM HG: ICD-10-PCS | Mod: CPTII,S$GLB,, | Performed by: NURSE PRACTITIONER

## 2021-03-09 PROCEDURE — 1126F AMNT PAIN NOTED NONE PRSNT: CPT | Mod: S$GLB,,, | Performed by: NURSE PRACTITIONER

## 2021-03-09 PROCEDURE — 86334 IMMUNOFIX E-PHORESIS SERUM: CPT | Mod: 26,,, | Performed by: PATHOLOGY

## 2021-03-09 PROCEDURE — 1101F PR PT FALLS ASSESS DOC 0-1 FALLS W/OUT INJ PAST YR: ICD-10-PCS | Mod: CPTII,S$GLB,, | Performed by: NURSE PRACTITIONER

## 2021-03-09 PROCEDURE — 3077F PR MOST RECENT SYSTOLIC BLOOD PRESSURE >= 140 MM HG: ICD-10-PCS | Mod: CPTII,S$GLB,, | Performed by: NURSE PRACTITIONER

## 2021-03-09 PROCEDURE — 86334 PATHOLOGIST INTERPRETATION IFE: ICD-10-PCS | Mod: 26,,, | Performed by: PATHOLOGY

## 2021-03-09 PROCEDURE — 99999 PR PBB SHADOW E&M-EST. PATIENT-LVL III: CPT | Mod: PBBFAC,,, | Performed by: NURSE PRACTITIONER

## 2021-03-09 PROCEDURE — 3079F DIAST BP 80-89 MM HG: CPT | Mod: CPTII,S$GLB,, | Performed by: NURSE PRACTITIONER

## 2021-03-09 PROCEDURE — 99499 RISK ADDL DX/OHS AUDIT: ICD-10-PCS | Mod: S$GLB,,, | Performed by: NURSE PRACTITIONER

## 2021-03-09 PROCEDURE — 3077F SYST BP >= 140 MM HG: CPT | Mod: CPTII,S$GLB,, | Performed by: NURSE PRACTITIONER

## 2021-03-09 PROCEDURE — 3288F PR FALLS RISK ASSESSMENT DOCUMENTED: ICD-10-PCS | Mod: CPTII,S$GLB,, | Performed by: NURSE PRACTITIONER

## 2021-03-09 PROCEDURE — 80053 COMPREHEN METABOLIC PANEL: CPT | Performed by: INTERNAL MEDICINE

## 2021-03-09 PROCEDURE — 3288F FALL RISK ASSESSMENT DOCD: CPT | Mod: CPTII,S$GLB,, | Performed by: NURSE PRACTITIONER

## 2021-03-09 PROCEDURE — 36415 COLL VENOUS BLD VENIPUNCTURE: CPT | Performed by: INTERNAL MEDICINE

## 2021-03-09 PROCEDURE — 99214 OFFICE O/P EST MOD 30 MIN: CPT | Mod: S$GLB,,, | Performed by: NURSE PRACTITIONER

## 2021-03-09 PROCEDURE — 86334 IMMUNOFIX E-PHORESIS SERUM: CPT | Performed by: INTERNAL MEDICINE

## 2021-03-09 PROCEDURE — 99214 PR OFFICE/OUTPT VISIT, EST, LEVL IV, 30-39 MIN: ICD-10-PCS | Mod: S$GLB,,, | Performed by: NURSE PRACTITIONER

## 2021-03-09 PROCEDURE — 99499 UNLISTED E&M SERVICE: CPT | Mod: S$GLB,,, | Performed by: NURSE PRACTITIONER

## 2021-03-10 LAB
ALBUMIN SERPL ELPH-MCNC: 4.17 G/DL (ref 3.35–5.55)
ALPHA1 GLOB SERPL ELPH-MCNC: 0.35 G/DL (ref 0.17–0.41)
ALPHA2 GLOB SERPL ELPH-MCNC: 1.13 G/DL (ref 0.43–0.99)
B-GLOBULIN SERPL ELPH-MCNC: 0.99 G/DL (ref 0.5–1.1)
GAMMA GLOB SERPL ELPH-MCNC: 1.86 G/DL (ref 0.67–1.58)
INTERPRETATION SERPL IFE-IMP: NORMAL
KAPPA LC SER QL IA: 4.01 MG/DL (ref 0.33–1.94)
KAPPA LC/LAMBDA SER IA: 0.3 (ref 0.26–1.65)
LAMBDA LC SER QL IA: 13.57 MG/DL (ref 0.57–2.63)
PATHOLOGIST INTERPRETATION IFE: NORMAL
PATHOLOGIST INTERPRETATION SPE: NORMAL
PROT SERPL-MCNC: 8.5 G/DL (ref 6–8.4)

## 2021-12-30 ENCOUNTER — OFFICE VISIT (OUTPATIENT)
Dept: URGENT CARE | Facility: CLINIC | Age: 81
End: 2021-12-30
Payer: MEDICARE

## 2021-12-30 VITALS
BODY MASS INDEX: 30.54 KG/M2 | TEMPERATURE: 99 F | SYSTOLIC BLOOD PRESSURE: 128 MMHG | HEIGHT: 74 IN | WEIGHT: 238 LBS | DIASTOLIC BLOOD PRESSURE: 80 MMHG | HEART RATE: 98 BPM | OXYGEN SATURATION: 99 %

## 2021-12-30 DIAGNOSIS — R10.9 FLANK PAIN: ICD-10-CM

## 2021-12-30 DIAGNOSIS — R05.9 COUGH: ICD-10-CM

## 2021-12-30 DIAGNOSIS — R10.11 RIGHT UPPER QUADRANT ABDOMINAL PAIN: ICD-10-CM

## 2021-12-30 DIAGNOSIS — U07.1 COVID-19 VIRUS INFECTION: Primary | ICD-10-CM

## 2021-12-30 DIAGNOSIS — R31.9 HEMATURIA, UNSPECIFIED TYPE: ICD-10-CM

## 2021-12-30 LAB
BILIRUB UR QL STRIP: NEGATIVE
CTP QC/QA: YES
GLUCOSE UR QL STRIP: NEGATIVE
KETONES UR QL STRIP: NEGATIVE
LEUKOCYTE ESTERASE UR QL STRIP: NEGATIVE
PH, POC UA: 5
POC BLOOD, URINE: POSITIVE
POC NITRATES, URINE: NEGATIVE
PROT UR QL STRIP: POSITIVE
SARS-COV-2 RDRP RESP QL NAA+PROBE: POSITIVE
SP GR UR STRIP: 1.02 (ref 1–1.03)
UROBILINOGEN UR STRIP-ACNC: NORMAL (ref 0.3–2.2)

## 2021-12-30 PROCEDURE — 1160F RVW MEDS BY RX/DR IN RCRD: CPT | Mod: CPTII,S$GLB,, | Performed by: NURSE PRACTITIONER

## 2021-12-30 PROCEDURE — 3079F DIAST BP 80-89 MM HG: CPT | Mod: CPTII,S$GLB,, | Performed by: NURSE PRACTITIONER

## 2021-12-30 PROCEDURE — 1159F PR MEDICATION LIST DOCUMENTED IN MEDICAL RECORD: ICD-10-PCS | Mod: CPTII,S$GLB,, | Performed by: NURSE PRACTITIONER

## 2021-12-30 PROCEDURE — 3079F PR MOST RECENT DIASTOLIC BLOOD PRESSURE 80-89 MM HG: ICD-10-PCS | Mod: CPTII,S$GLB,, | Performed by: NURSE PRACTITIONER

## 2021-12-30 PROCEDURE — U0002 COVID-19 LAB TEST NON-CDC: HCPCS | Mod: QW,S$GLB,, | Performed by: NURSE PRACTITIONER

## 2021-12-30 PROCEDURE — 81003 POCT URINALYSIS, DIPSTICK, AUTOMATED, W/O SCOPE: ICD-10-PCS | Mod: QW,S$GLB,, | Performed by: NURSE PRACTITIONER

## 2021-12-30 PROCEDURE — 81003 URINALYSIS AUTO W/O SCOPE: CPT | Mod: QW,S$GLB,, | Performed by: NURSE PRACTITIONER

## 2021-12-30 PROCEDURE — 1160F PR REVIEW ALL MEDS BY PRESCRIBER/CLIN PHARMACIST DOCUMENTED: ICD-10-PCS | Mod: CPTII,S$GLB,, | Performed by: NURSE PRACTITIONER

## 2021-12-30 PROCEDURE — 99203 PR OFFICE/OUTPT VISIT, NEW, LEVL III, 30-44 MIN: ICD-10-PCS | Mod: 25,S$GLB,, | Performed by: NURSE PRACTITIONER

## 2021-12-30 PROCEDURE — 74019 XR ABDOMEN FLAT AND ERECT: ICD-10-PCS | Mod: S$GLB,,, | Performed by: RADIOLOGY

## 2021-12-30 PROCEDURE — 3074F PR MOST RECENT SYSTOLIC BLOOD PRESSURE < 130 MM HG: ICD-10-PCS | Mod: CPTII,S$GLB,, | Performed by: NURSE PRACTITIONER

## 2021-12-30 PROCEDURE — 87086 URINE CULTURE/COLONY COUNT: CPT | Performed by: NURSE PRACTITIONER

## 2021-12-30 PROCEDURE — 3074F SYST BP LT 130 MM HG: CPT | Mod: CPTII,S$GLB,, | Performed by: NURSE PRACTITIONER

## 2021-12-30 PROCEDURE — U0002: ICD-10-PCS | Mod: QW,S$GLB,, | Performed by: NURSE PRACTITIONER

## 2021-12-30 PROCEDURE — 74019 RADEX ABDOMEN 2 VIEWS: CPT | Mod: S$GLB,,, | Performed by: RADIOLOGY

## 2021-12-30 PROCEDURE — 1159F MED LIST DOCD IN RCRD: CPT | Mod: CPTII,S$GLB,, | Performed by: NURSE PRACTITIONER

## 2021-12-30 PROCEDURE — 99203 OFFICE O/P NEW LOW 30 MIN: CPT | Mod: 25,S$GLB,, | Performed by: NURSE PRACTITIONER

## 2022-01-03 ENCOUNTER — TELEPHONE (OUTPATIENT)
Dept: URGENT CARE | Facility: CLINIC | Age: 82
End: 2022-01-03
Payer: MEDICARE

## 2022-01-03 DIAGNOSIS — U07.1 COVID-19 VIRUS DETECTED: ICD-10-CM

## 2022-01-03 LAB — BACTERIA UR CULT: NO GROWTH

## 2022-01-03 NOTE — TELEPHONE ENCOUNTER
Called and discussed labs with patient.  Urine culture with no growth.  He verified name and date of birth, reports that he feels much better, I would she discussed with him the hematuria and that he should follow-up with PCP regarding this he did agree with plan and verbalized understanding.  I advised him to follow-up with any future questions or concerns and he agreed with the plan.    Results for orders placed or performed in visit on 12/30/21   Culture, Urine    Specimen: Urine, Clean Catch   Result Value Ref Range    Urine Culture, Routine No growth    POCT Urinalysis, Dipstick, Automated, W/O Scope   Result Value Ref Range    POC Blood, Urine Positive (A) Negative    POC Bilirubin, Urine Negative Negative    POC Urobilinogen, Urine normal 0.3 - 2.2    POC Ketones, Urine Negative Negative    POC Protein, Urine Positive (A) Negative    POC Nitrates, Urine Negative Negative    POC Glucose, Urine Negative Negative    pH, UA 5.0     POC Specific Gravity, Urine 1.025 1.003 - 1.029    POC Leukocytes, Urine Negative Negative   POCT COVID-19 Rapid Screening   Result Value Ref Range    POC Rapid COVID Positive (A) Negative     Acceptable Yes

## 2022-01-05 ENCOUNTER — PATIENT OUTREACH (OUTPATIENT)
Dept: ADMINISTRATIVE | Facility: HOSPITAL | Age: 82
End: 2022-01-05
Payer: MEDICARE

## 2022-02-28 ENCOUNTER — TELEPHONE (OUTPATIENT)
Dept: INTERNAL MEDICINE | Facility: CLINIC | Age: 82
End: 2022-02-28
Payer: MEDICARE

## 2022-02-28 NOTE — TELEPHONE ENCOUNTER
Approved Prescriptions     allopurinoL (ZYLOPRIM) 100 MG tablet         Sig: TAKE 1 TABLET BY MOUTH EVERY DAY    Disp:  90 tablet    Refills:  0 (Pharmacy requested: Not specified)    Start: 2/28/2022    Class: Normal    Authorized by: Jeison Edmonds MD    For: Idiopathic gout of right foot, unspecified chronicity        To be filled at: [facility]/pharmacy #[id number] [facility] [address] FOR IN REGARDS TO THE ABOVE RX REQUEST LETTING HIM KNOW THAT IT'S BEEN FILLED

## 2022-04-01 RX ORDER — NIFEDIPINE 60 MG/1
TABLET, EXTENDED RELEASE ORAL
Qty: 90 TABLET | Refills: 0 | Status: SHIPPED | OUTPATIENT
Start: 2022-04-01 | End: 2022-06-28

## 2022-04-01 NOTE — TELEPHONE ENCOUNTER
No new care gaps identified.  Powered by [email or web address]. Reference number: [id number].   4/01/2022 1:09:30 AM CDT

## 2022-04-01 NOTE — TELEPHONE ENCOUNTER
This Rx Request does not qualify for assessment with the OR   Please review protocol details and the Care Due Message for extra clinical information    Reasons Rx Request may be deferred:  Pt due for OV with PCP    Note composed:3:32 PM 04/01/2022

## 2022-04-10 DIAGNOSIS — E78.00 HYPERCHOLESTEROLEMIA: ICD-10-CM

## 2022-04-11 RX ORDER — SIMVASTATIN 40 MG/1
TABLET, FILM COATED ORAL
Qty: 90 TABLET | Refills: 3 | Status: SHIPPED | OUTPATIENT
Start: 2022-04-11 | End: 2023-04-10

## 2022-06-05 DIAGNOSIS — M10.071 IDIOPATHIC GOUT OF RIGHT FOOT, UNSPECIFIED CHRONICITY: ICD-10-CM

## 2022-06-05 NOTE — TELEPHONE ENCOUNTER
Care Due:                  Date            Visit Type   Department     Provider  --------------------------------------------------------------------------------    Last Visit: None Found      None         None Found  Next Visit: None Scheduled  None         None Found                                                            Last  Test          Frequency    Reason                     Performed    Due Date  --------------------------------------------------------------------------------    Office Visit  12 months..  allopurinoL, losartan,     Not Found    Overdue                             simvastatin..............    CBC.........  12 months..  allopurinoL..............  03- 03-    CMP.........  12 months..  allopurinoL, losartan,     03- 03-                             simvastatin..............    Lipid Panel.  12 months..  simvastatin..............  Not Found    Overdue    Uric Acid...  12 months..  allopurinoL..............  Not Found    Overdue    [employer] Embedded Care Gaps. Reference number: [id number]. 6/05/2022   7:05:45 AM CDT

## 2022-06-05 NOTE — TELEPHONE ENCOUNTER
No new care gaps identified.  [facility] Embedded Care Gaps. Reference number: [id number]. 6/05/2022   7:06:06 AM DEANNAT

## 2022-06-06 RX ORDER — ALLOPURINOL 100 MG/1
TABLET ORAL
Qty: 90 TABLET | Refills: 0 | OUTPATIENT
Start: 2022-06-06

## 2022-06-06 RX ORDER — LOSARTAN POTASSIUM 100 MG/1
TABLET ORAL
Qty: 90 TABLET | Refills: 1 | OUTPATIENT
Start: 2022-06-06

## 2022-06-06 NOTE — TELEPHONE ENCOUNTER
Refill Routing Note   Medication(s) are not appropriate for processing by [facility] for the following reason(s):      - Patient has not been seen in over 15 months by PCP    [facility] action(s):  Defer Medication-related problems identified:   Requires labs  Requires appointment     Medication Therapy Plan: Needs an OV with PCP, Last PCP Visit: 1/21/2020; Labs (CBC, CMP, lipid panel, uric acid) overdue  Medication reconciliation completed: No     Appointments  past 12m or future 3m with PCP    Date Provider   Last Visit   1/21/2020 Moe Gallardo MD   Next Visit   6/5/2022 Moe Gallardo MD   ED visits in past 90 days: 0        Note composed:11:07 AM 06/06/2022

## 2022-06-06 NOTE — TELEPHONE ENCOUNTER
Refill Routing Note   Medication(s) are not appropriate for processing by [facility] for the following reason(s):      - Patient has not been seen in over 15 months by PCP  - Required laboratory values are outdated    [facility] action(s):  Defer Medication-related problems identified:   Requires labs  Requires appointment        Medication reconciliation completed: No     Appointments  past 12m or future 3m with PCP    Date Provider   Last Visit   1/21/2020 Moe Gallardo MD   Next Visit   Visit date not found Moe Gallardo MD   ED visits in past 90 days: 0        Note composed:10:30 AM 06/06/2022

## 2022-06-06 NOTE — TELEPHONE ENCOUNTER
Provider Staff:     Action required for this patient.    Please note Refusal of medication.            Requested Prescriptions     Refused Prescriptions Disp Refills    allopurinoL (ZYLOPRIM) 100 MG tablet [Pharmacy Med Name: ALLOPURINOL 100 MG TABLET] 90 tablet 0     Sig: TAKE 1 TABLET BY MOUTH EVERY DAY     Refused By: YESSY MOON     Reason for Refusal: Patient needs an appointment      Thanks!  [facility]   Note composed: 06/06/2022 11:52 AM

## 2022-06-06 NOTE — TELEPHONE ENCOUNTER
Was unsuccessful in contacting pt and pt spouse because their contacts were disconnected to inform them that pt recent refill request was refused below:    Refused Prescriptions     Name from pharmacy: ALLOPURINOL 100 MG TABLET         Will file in chart as: allopurinoL (ZYLOPRIM) 100 MG tablet    Sig: TAKE 1 TABLET BY MOUTH EVERY DAY    Disp:  90 tablet    Refills:  0 (Pharmacy requested: Not specified)    Start: 6/6/2022    Class: Normal    Refused by: Moe Gallardo MD    Refusal reason: Patient needs an appointment    For: Idiopathic gout of right foot, unspecified chronicity        Fill requested from: [facility]/pharmacy #[id number] [place], [place] - [address]

## 2022-06-09 NOTE — TELEPHONE ENCOUNTER
Tried calling both pt and his wife to inform him about medication refusal  and to schedule an appt but both numbers are out of service    Refused Prescriptions     Name from pharmacy: LOSARTAN POTASSIUM 100 MG TAB         Will file in chart as: losartan (COZAAR) 100 MG tablet    Sig: TAKE 1 TABLET BY MOUTH EVERY DAY    Disp:  90 tablet    Refills:  1    Start: 6/6/2022    Class: Normal    Refused by: Moe Gallardo MD    Refusal reason: Patient needs an appointment        Fill requested from: [id number] - [place], [place] - [address]        Patient has not been seen in over 15 months by PCP

## 2022-06-09 NOTE — TELEPHONE ENCOUNTER
Provider Staff:     Action required for this patient.    Please note Refusal of medication.            Requested Prescriptions     Refused Prescriptions Disp Refills    losartan (COZAAR) 100 MG tablet [Pharmacy Med Name: LOSARTAN POTASSIUM 100 MG TAB] 90 tablet 1     Sig: TAKE 1 TABLET BY MOUTH EVERY DAY     Refused By: YESSY MOON     Reason for Refusal: Patient needs an appointment      Thanks!  [facility]   Note composed: 06/09/2022 2:30 PM

## 2022-06-15 RX ORDER — LOSARTAN POTASSIUM 100 MG/1
TABLET ORAL
Qty: 90 TABLET | Refills: 1 | OUTPATIENT
Start: 2022-06-15

## 2022-06-15 NOTE — TELEPHONE ENCOUNTER
No new care gaps identified.  [facility] Embedded Care Gaps. Reference number: [id number]. 6/15/2022   10:37:39 AM DEANNAT

## 2022-06-15 NOTE — TELEPHONE ENCOUNTER
Refill Decision Note   Tip Hurst  is requesting a refill authorization.  Brief Assessment and Rationale for Refill:  Quick Discontinue    -Medication-Related Problems Identified:   Requires labs  Requires appointment  Medication Therapy Plan:       Medication Reconciliation Completed: No   Comments:     No Care Gaps recommended.     Note composed:6:23 PM 06/15/2022

## 2022-06-20 DIAGNOSIS — I10 HTN (HYPERTENSION), BENIGN: Primary | ICD-10-CM

## 2022-06-20 RX ORDER — LOSARTAN POTASSIUM 100 MG/1
100 TABLET ORAL DAILY
Qty: 90 TABLET | Refills: 3 | Status: SHIPPED | OUTPATIENT
Start: 2022-06-20 | End: 2023-06-26

## 2022-06-20 NOTE — TELEPHONE ENCOUNTER
No new care gaps identified.  [facility] Embedded Care Gaps. Reference number: [id number]. 6/20/2022   12:15:34 PM CDT

## 2022-06-20 NOTE — TELEPHONE ENCOUNTER
----- Message from Pilar Lutz sent at 6/20/2022 11:12 AM CDT -----  Contact: ESTEFANIA ROBISON [[id number]]  Type:  RX Refill Request      Who Called:  ESTEFANIA ROBISON [[id number]]      Refill or New Rx: refill       RX Name and Strength: losartan (COZAAR) 100 MG tablet      How is the patient currently taking it? (ex. 1XDay): Sig: TAKE 1 TABLET BY MOUTH EVERY DAY      Is this a 30 day or 90 day RX: 90      Preferred Pharmacy with phone number: [facility]/PHARMACY #[id number] [facility] [address]      Local or Mail Order: Local      Ordering Provider: Moe Gallardo MD      Would the patient rather a call back or a response via [facility]?      Best Call Back Number: [phone] (mobile)      Additional Information:  pt states he is out of mediation.

## 2022-06-20 NOTE — TELEPHONE ENCOUNTER
No new care gaps identified.  [facility] Embedded Care Gaps. Reference number: [id number]. 6/20/2022   1:16:29 PM CDT

## 2022-06-21 RX ORDER — LOSARTAN POTASSIUM 100 MG/1
TABLET ORAL
Qty: 90 TABLET | Refills: 1 | OUTPATIENT
Start: 2022-06-21

## 2022-06-21 NOTE — TELEPHONE ENCOUNTER
Quick DC. Request already responded to by other means (e.g. phone or fax)   Refill Authorization Note   Tip Hurst  is requesting a refill authorization.  Brief Assessment and Rationale for Refill:  Quick Discontinue  Medication Therapy Plan:  Signed 6/20/22 by PCP    Medication Reconciliation Completed:  No      Comments:     Note composed:9:24 AM 06/21/2022

## 2022-06-28 ENCOUNTER — OFFICE VISIT (OUTPATIENT)
Dept: INTERNAL MEDICINE | Facility: CLINIC | Age: 82
End: 2022-06-28
Attending: INTERNAL MEDICINE
Payer: MEDICARE

## 2022-06-28 VITALS
HEIGHT: 74 IN | WEIGHT: 238.75 LBS | HEART RATE: 92 BPM | SYSTOLIC BLOOD PRESSURE: 144 MMHG | DIASTOLIC BLOOD PRESSURE: 62 MMHG | OXYGEN SATURATION: 97 % | BODY MASS INDEX: 30.64 KG/M2

## 2022-06-28 DIAGNOSIS — R74.8 ELEVATED ALKALINE PHOSPHATASE LEVEL: ICD-10-CM

## 2022-06-28 DIAGNOSIS — M79.89 RIGHT LEG SWELLING: ICD-10-CM

## 2022-06-28 DIAGNOSIS — M10.071 IDIOPATHIC GOUT OF RIGHT FOOT, UNSPECIFIED CHRONICITY: ICD-10-CM

## 2022-06-28 DIAGNOSIS — R60.0 LOCALIZED EDEMA: ICD-10-CM

## 2022-06-28 DIAGNOSIS — D89.2 PARAPROTEINEMIA: ICD-10-CM

## 2022-06-28 DIAGNOSIS — R73.03 PRE-DIABETES: ICD-10-CM

## 2022-06-28 DIAGNOSIS — R06.02 SOB (SHORTNESS OF BREATH): Primary | ICD-10-CM

## 2022-06-28 DIAGNOSIS — I10 HTN (HYPERTENSION), BENIGN: ICD-10-CM

## 2022-06-28 DIAGNOSIS — Z85.46 HISTORY OF PROSTATE CANCER: ICD-10-CM

## 2022-06-28 PROCEDURE — 3288F FALL RISK ASSESSMENT DOCD: CPT | Mod: CPTII,S$GLB,, | Performed by: INTERNAL MEDICINE

## 2022-06-28 PROCEDURE — 3077F PR MOST RECENT SYSTOLIC BLOOD PRESSURE >= 140 MM HG: ICD-10-PCS | Mod: CPTII,S$GLB,, | Performed by: INTERNAL MEDICINE

## 2022-06-28 PROCEDURE — 99499 RISK ADDL DX/OHS AUDIT: ICD-10-PCS | Mod: S$GLB,,, | Performed by: INTERNAL MEDICINE

## 2022-06-28 PROCEDURE — 99214 OFFICE O/P EST MOD 30 MIN: CPT | Mod: S$GLB,,, | Performed by: INTERNAL MEDICINE

## 2022-06-28 PROCEDURE — 3078F PR MOST RECENT DIASTOLIC BLOOD PRESSURE < 80 MM HG: ICD-10-PCS | Mod: CPTII,S$GLB,, | Performed by: INTERNAL MEDICINE

## 2022-06-28 PROCEDURE — 99999 PR PBB SHADOW E&M-EST. PATIENT-LVL IV: ICD-10-PCS | Mod: PBBFAC,,, | Performed by: INTERNAL MEDICINE

## 2022-06-28 PROCEDURE — 3077F SYST BP >= 140 MM HG: CPT | Mod: CPTII,S$GLB,, | Performed by: INTERNAL MEDICINE

## 2022-06-28 PROCEDURE — 1126F PR PAIN SEVERITY QUANTIFIED, NO PAIN PRESENT: ICD-10-PCS | Mod: CPTII,S$GLB,, | Performed by: INTERNAL MEDICINE

## 2022-06-28 PROCEDURE — 1126F AMNT PAIN NOTED NONE PRSNT: CPT | Mod: CPTII,S$GLB,, | Performed by: INTERNAL MEDICINE

## 2022-06-28 PROCEDURE — 99214 PR OFFICE/OUTPT VISIT, EST, LEVL IV, 30-39 MIN: ICD-10-PCS | Mod: S$GLB,,, | Performed by: INTERNAL MEDICINE

## 2022-06-28 PROCEDURE — 1101F PR PT FALLS ASSESS DOC 0-1 FALLS W/OUT INJ PAST YR: ICD-10-PCS | Mod: CPTII,S$GLB,, | Performed by: INTERNAL MEDICINE

## 2022-06-28 PROCEDURE — 99999 PR PBB SHADOW E&M-EST. PATIENT-LVL IV: CPT | Mod: PBBFAC,,, | Performed by: INTERNAL MEDICINE

## 2022-06-28 PROCEDURE — 1159F PR MEDICATION LIST DOCUMENTED IN MEDICAL RECORD: ICD-10-PCS | Mod: CPTII,S$GLB,, | Performed by: INTERNAL MEDICINE

## 2022-06-28 PROCEDURE — 3288F PR FALLS RISK ASSESSMENT DOCUMENTED: ICD-10-PCS | Mod: CPTII,S$GLB,, | Performed by: INTERNAL MEDICINE

## 2022-06-28 PROCEDURE — 1101F PT FALLS ASSESS-DOCD LE1/YR: CPT | Mod: CPTII,S$GLB,, | Performed by: INTERNAL MEDICINE

## 2022-06-28 PROCEDURE — 1160F PR REVIEW ALL MEDS BY PRESCRIBER/CLIN PHARMACIST DOCUMENTED: ICD-10-PCS | Mod: CPTII,S$GLB,, | Performed by: INTERNAL MEDICINE

## 2022-06-28 PROCEDURE — 99499 UNLISTED E&M SERVICE: CPT | Mod: S$GLB,,, | Performed by: INTERNAL MEDICINE

## 2022-06-28 PROCEDURE — 1159F MED LIST DOCD IN RCRD: CPT | Mod: CPTII,S$GLB,, | Performed by: INTERNAL MEDICINE

## 2022-06-28 PROCEDURE — 3078F DIAST BP <80 MM HG: CPT | Mod: CPTII,S$GLB,, | Performed by: INTERNAL MEDICINE

## 2022-06-28 PROCEDURE — 1160F RVW MEDS BY RX/DR IN RCRD: CPT | Mod: CPTII,S$GLB,, | Performed by: INTERNAL MEDICINE

## 2022-06-28 RX ORDER — LEVOCETIRIZINE DIHYDROCHLORIDE 5 MG/1
5 TABLET, FILM COATED ORAL NIGHTLY
Qty: 30 TABLET | Refills: 11 | Status: SHIPPED | OUTPATIENT
Start: 2022-06-28 | End: 2023-07-25 | Stop reason: SDUPTHER

## 2022-06-28 RX ORDER — NIFEDIPINE 90 MG/1
90 TABLET, EXTENDED RELEASE ORAL DAILY
Qty: 90 TABLET | Refills: 2 | Status: SHIPPED | OUTPATIENT
Start: 2022-06-28 | End: 2023-05-04 | Stop reason: SDUPTHER

## 2022-06-28 NOTE — PROGRESS NOTES
"Subjective:       Patient ID: Tip Hurst is a 81 y.o. male.    Chief Complaint: Medication Refill, Shortness of Breath, and Hypertension    Here for annual exam  [facility] 01/2020    82 yo with PMHx f prostate CA with biochemical recurrence, HTN, CKD stage 3, Paraproteinemia, gout, elevated alkaline phosphatase.      ### IgM monoclonal gammopathy  ###  24 hr urine studies 2020 with no paraprotein and moderate proteinura   IFX showed IgM lambda monoclonal band. UPEP is negative for paraprotein bands.   3/9/2021 [facility] heme NP Lidia Adler writes "Certainly could have low grade Lymphoproliferative disorder but given asmptomatic/stable labs would likely monitor anywayIn  Light of age, comorbidites and no overt progression can defer marrow and recommend fu in 6 months (vs annualy since no marrow)  Patient did not show for BMBx appointment on 2/20/20       ### Anemia ###  Heme suspects this is AOCD and less likely related to gammopathy.         ### prostate CA ###  Biochemical recurrence with very slow rise in PSA over the last 10 years.  [facility] urology Dr West writes "Again, no need for additional treatment such as ADT at this time (or hopefully ever)-Follow-up 6 months with PSA"  -In 2018 he had incidental finding of a possible bladder lesion on ultrasound. Cystoscopy was normal at that time.     ### CKD ###           ESTGFRAFRICA             45.7 (A)            03/09/2021 08:59 AM        ESTGFRAFRICA             39.9 (A)            08/11/2020 07:41 AM        ESTGFRAFRICA             50 (A)              04/03/2020 08:27 AM        ESTGFRAFRICA             46 (A)              01/16/2020 10:10 AM        ESTGFRAFRICA             46 (A)              01/06/2020 09:35 AM        ESTGFRAFRICA             43 (A)              06/22/2018 02:10 PM        ESTGFRAFRICA             47 (A)              06/19/2018 10:30 AM        ESTGFRAFRICA             56.0 (A)            03/17/2015 09:22 AM        ESTGFRAFRICA             56.0 (A)            " "12/31/2014 12:05 PM        SARITA             52.3 (A)            09/05/2013 09:22 AM       Bilateral knee pain and aching of hips once a month       Review of Systems    Objective:      Vitals:    06/28/22 1046 06/28/22 1128   BP: (!) 148/54 (!) 144/62   Pulse: 92    SpO2: 97%    Weight: 108.3 kg (238 lb 12.1 oz)    Height: 6' 2" (1.88 m)       Physical Exam    Assessment:       1. SOB (shortness of breath)    2. HTN (hypertension), benign    3. Pre-diabetes    4. Idiopathic gout of right foot, unspecified chronicity    5. Paraproteinemia    6. History of prostate cancer    7. Right leg swelling    8. Localized edema     9. Elevated alkaline phosphatase level    10. Body mass index (BMI) 30.0-30.9, adult         Plan:       Tip was seen today for medication refill, shortness of breath and hypertension.    Diagnoses and all orders for this visit:    SOB (shortness of breath)  -     levocetirizine (XYZAL) 5 MG tablet; Take 1 tablet (5 mg total) by mouth every evening.    HTN (hypertension), benign  -     Comprehensive Metabolic Panel; Future  -     Lipid Panel; Future  -     TSH; Future  -     CBC Auto Differential; Future  -     Hemoglobin A1C; Future    Pre-diabetes  -     Hemoglobin A1C; Future    Idiopathic gout of right foot, unspecified chronicity  -     Uric Acid; Future    Paraproteinemia  -     IMMUNOGLOBULIN FREE LT CHAINS BLOOD; Future  -     IMMUNOGLOBULINS (IGG, IGA, IGM) QUANTITATIVE; Future  -     IMMUNOFIXATION ELECTROPHORESIS, SERUM; Future  -     Cancel: Protein electrophoresis, timed urine; Future  -     Protein Electrophoresis, Serum; Future    History of prostate cancer  -     PROSTATE SPECIFIC ANTIGEN, DIAGNOSTIC; Future    Right leg swelling  -     US Lower Extremity Veins Right; Future    Localized edema   -     US Lower Extremity Veins Right; Future    Elevated alkaline phosphatase level  -     Vitamin D; Future    Body mass index (BMI) 30.0-30.9, adult   -     Vitamin D; " Future    Other orders  -     NIFEdipine (PROCARDIA-XL) 90 MG (OSM) 24 hr tablet; Take 1 tablet (90 mg total) by mouth once daily.           Moe Contreras MD  Internal Medicine-[facility]        Side effects of medication(s) were discussed in detail and patient voiced understanding.  Patient will call back for any issues or complications.

## 2022-07-13 RX ORDER — NIFEDIPINE 60 MG/1
TABLET, EXTENDED RELEASE ORAL
Qty: 90 TABLET | Refills: 0 | OUTPATIENT
Start: 2022-07-13

## 2022-07-13 NOTE — TELEPHONE ENCOUNTER
No new care gaps identified.  [facility] Embedded Care Gaps. Reference number: [id number]. 7/13/2022   12:09:27 AM DEANNAT

## 2022-07-14 NOTE — TELEPHONE ENCOUNTER
Refill Decision Note   Tip Hurst  is requesting a refill authorization.  Brief Assessment and Rationale for Refill:  Quick Discontinue    -Medication-Related Problems Identified: Dose adjustment  Medication Therapy Plan:  Dose increased to 90 mg    Medication Reconciliation Completed: No   Comments:     No Care Gaps recommended.     Note composed:7:22 PM 07/13/2022

## 2022-07-18 ENCOUNTER — TELEPHONE (OUTPATIENT)
Dept: INTERNAL MEDICINE | Facility: CLINIC | Age: 82
End: 2022-07-18
Payer: MEDICARE

## 2022-07-18 NOTE — TELEPHONE ENCOUNTER
----- Message from Debora Goel sent at 7/18/2022 10:12 AM CDT -----  Regarding: self [phone]  .Type: Patient Call Back    Who called: self     What is the request in detail: Requesting to reschedule appt for Vein U/S    Can the clinic reply by [facility]? Call back     Would the patient rather a call back or a response via [facility]?  Call back   Best call back number: .[phone]

## 2022-07-18 NOTE — TELEPHONE ENCOUNTER
Unable to LVM for patient to return call to office. Patient is not accepting calls at this time. Please see message below for regards. Thanks.

## 2022-08-04 ENCOUNTER — LAB VISIT (OUTPATIENT)
Dept: LAB | Facility: HOSPITAL | Age: 82
End: 2022-08-04
Attending: NURSE PRACTITIONER
Payer: MEDICARE

## 2022-08-04 ENCOUNTER — OFFICE VISIT (OUTPATIENT)
Dept: HEMATOLOGY/ONCOLOGY | Facility: CLINIC | Age: 82
End: 2022-08-04
Payer: MEDICARE

## 2022-08-04 VITALS
DIASTOLIC BLOOD PRESSURE: 77 MMHG | SYSTOLIC BLOOD PRESSURE: 180 MMHG | HEIGHT: 74 IN | TEMPERATURE: 98 F | WEIGHT: 242.5 LBS | OXYGEN SATURATION: 96 % | RESPIRATION RATE: 17 BRPM | BODY MASS INDEX: 31.12 KG/M2 | HEART RATE: 82 BPM

## 2022-08-04 DIAGNOSIS — D47.2 MGUS (MONOCLONAL GAMMOPATHY OF UNKNOWN SIGNIFICANCE): ICD-10-CM

## 2022-08-04 DIAGNOSIS — D47.2 MGUS (MONOCLONAL GAMMOPATHY OF UNKNOWN SIGNIFICANCE): Primary | ICD-10-CM

## 2022-08-04 LAB
ALBUMIN SERPL BCP-MCNC: 3.8 G/DL (ref 3.5–5.2)
ALP SERPL-CCNC: 138 U/L (ref 55–135)
ALT SERPL W/O P-5'-P-CCNC: 27 U/L (ref 10–44)
ANION GAP SERPL CALC-SCNC: 8 MMOL/L (ref 8–16)
AST SERPL-CCNC: 22 U/L (ref 10–40)
BASOPHILS # BLD AUTO: 0.07 K/UL (ref 0–0.2)
BASOPHILS NFR BLD: 0.8 % (ref 0–1.9)
BILIRUB SERPL-MCNC: 0.5 MG/DL (ref 0.1–1)
BUN SERPL-MCNC: 22 MG/DL (ref 8–23)
CALCIUM SERPL-MCNC: 9.6 MG/DL (ref 8.7–10.5)
CHLORIDE SERPL-SCNC: 107 MMOL/L (ref 95–110)
CO2 SERPL-SCNC: 23 MMOL/L (ref 23–29)
CREAT SERPL-MCNC: 1.6 MG/DL (ref 0.5–1.4)
DIFFERENTIAL METHOD: ABNORMAL
EOSINOPHIL # BLD AUTO: 0.4 K/UL (ref 0–0.5)
EOSINOPHIL NFR BLD: 4.4 % (ref 0–8)
ERYTHROCYTE [DISTWIDTH] IN BLOOD BY AUTOMATED COUNT: 13.3 % (ref 11.5–14.5)
EST. GFR  (NO RACE VARIABLE): 42.5 ML/MIN/1.73 M^2
GLUCOSE SERPL-MCNC: 107 MG/DL (ref 70–110)
HCT VFR BLD AUTO: 35 % (ref 40–54)
HGB BLD-MCNC: 11.3 G/DL (ref 14–18)
IGA SERPL-MCNC: 308 MG/DL (ref 40–350)
IGG SERPL-MCNC: 1104 MG/DL (ref 650–1600)
IGM SERPL-MCNC: 1363 MG/DL (ref 50–300)
IMM GRANULOCYTES # BLD AUTO: 0.04 K/UL (ref 0–0.04)
IMM GRANULOCYTES NFR BLD AUTO: 0.4 % (ref 0–0.5)
LYMPHOCYTES # BLD AUTO: 3.4 K/UL (ref 1–4.8)
LYMPHOCYTES NFR BLD: 36.7 % (ref 18–48)
MCH RBC QN AUTO: 27.9 PG (ref 27–31)
MCHC RBC AUTO-ENTMCNC: 32.3 G/DL (ref 32–36)
MCV RBC AUTO: 86 FL (ref 82–98)
MONOCYTES # BLD AUTO: 0.8 K/UL (ref 0.3–1)
MONOCYTES NFR BLD: 8.8 % (ref 4–15)
NEUTROPHILS # BLD AUTO: 4.6 K/UL (ref 1.8–7.7)
NEUTROPHILS NFR BLD: 48.9 % (ref 38–73)
NRBC BLD-RTO: 0 /100 WBC
PLATELET # BLD AUTO: 370 K/UL (ref 150–450)
PMV BLD AUTO: 9 FL (ref 9.2–12.9)
POTASSIUM SERPL-SCNC: 4.8 MMOL/L (ref 3.5–5.1)
PROT SERPL-MCNC: 8.7 G/DL (ref 6–8.4)
RBC # BLD AUTO: 4.05 M/UL (ref 4.6–6.2)
SODIUM SERPL-SCNC: 138 MMOL/L (ref 136–145)
WBC # BLD AUTO: 9.31 K/UL (ref 3.9–12.7)

## 2022-08-04 PROCEDURE — 1159F PR MEDICATION LIST DOCUMENTED IN MEDICAL RECORD: ICD-10-PCS | Mod: CPTII,S$GLB,, | Performed by: NURSE PRACTITIONER

## 2022-08-04 PROCEDURE — 86334 IMMUNOFIX E-PHORESIS SERUM: CPT | Performed by: NURSE PRACTITIONER

## 2022-08-04 PROCEDURE — 1160F RVW MEDS BY RX/DR IN RCRD: CPT | Mod: CPTII,S$GLB,, | Performed by: NURSE PRACTITIONER

## 2022-08-04 PROCEDURE — 1126F AMNT PAIN NOTED NONE PRSNT: CPT | Mod: CPTII,S$GLB,, | Performed by: NURSE PRACTITIONER

## 2022-08-04 PROCEDURE — 85025 COMPLETE CBC W/AUTO DIFF WBC: CPT | Performed by: NURSE PRACTITIONER

## 2022-08-04 PROCEDURE — 83520 IMMUNOASSAY QUANT NOS NONAB: CPT | Mod: 59 | Performed by: NURSE PRACTITIONER

## 2022-08-04 PROCEDURE — 1159F MED LIST DOCD IN RCRD: CPT | Mod: CPTII,S$GLB,, | Performed by: NURSE PRACTITIONER

## 2022-08-04 PROCEDURE — 99499 RISK ADDL DX/OHS AUDIT: ICD-10-PCS | Mod: S$GLB,,, | Performed by: NURSE PRACTITIONER

## 2022-08-04 PROCEDURE — 1126F PR PAIN SEVERITY QUANTIFIED, NO PAIN PRESENT: ICD-10-PCS | Mod: CPTII,S$GLB,, | Performed by: NURSE PRACTITIONER

## 2022-08-04 PROCEDURE — 99999 PR PBB SHADOW E&M-EST. PATIENT-LVL IV: ICD-10-PCS | Mod: PBBFAC,,, | Performed by: NURSE PRACTITIONER

## 2022-08-04 PROCEDURE — 99214 PR OFFICE/OUTPT VISIT, EST, LEVL IV, 30-39 MIN: ICD-10-PCS | Mod: S$GLB,,, | Performed by: NURSE PRACTITIONER

## 2022-08-04 PROCEDURE — 84165 PROTEIN E-PHORESIS SERUM: CPT | Mod: 26,,, | Performed by: PATHOLOGY

## 2022-08-04 PROCEDURE — 86334 PATHOLOGIST INTERPRETATION IFE: ICD-10-PCS | Mod: 26,,, | Performed by: PATHOLOGY

## 2022-08-04 PROCEDURE — 99214 OFFICE O/P EST MOD 30 MIN: CPT | Mod: S$GLB,,, | Performed by: NURSE PRACTITIONER

## 2022-08-04 PROCEDURE — 36415 COLL VENOUS BLD VENIPUNCTURE: CPT | Performed by: NURSE PRACTITIONER

## 2022-08-04 PROCEDURE — 3078F DIAST BP <80 MM HG: CPT | Mod: CPTII,S$GLB,, | Performed by: NURSE PRACTITIONER

## 2022-08-04 PROCEDURE — 86334 IMMUNOFIX E-PHORESIS SERUM: CPT | Mod: 26,,, | Performed by: PATHOLOGY

## 2022-08-04 PROCEDURE — 80053 COMPREHEN METABOLIC PANEL: CPT | Performed by: NURSE PRACTITIONER

## 2022-08-04 PROCEDURE — 3078F PR MOST RECENT DIASTOLIC BLOOD PRESSURE < 80 MM HG: ICD-10-PCS | Mod: CPTII,S$GLB,, | Performed by: NURSE PRACTITIONER

## 2022-08-04 PROCEDURE — 99999 PR PBB SHADOW E&M-EST. PATIENT-LVL IV: CPT | Mod: PBBFAC,,, | Performed by: NURSE PRACTITIONER

## 2022-08-04 PROCEDURE — 82784 ASSAY IGA/IGD/IGG/IGM EACH: CPT | Performed by: NURSE PRACTITIONER

## 2022-08-04 PROCEDURE — 84165 PATHOLOGIST INTERPRETATION SPE: ICD-10-PCS | Mod: 26,,, | Performed by: PATHOLOGY

## 2022-08-04 PROCEDURE — 3077F PR MOST RECENT SYSTOLIC BLOOD PRESSURE >= 140 MM HG: ICD-10-PCS | Mod: CPTII,S$GLB,, | Performed by: NURSE PRACTITIONER

## 2022-08-04 PROCEDURE — 99499 UNLISTED E&M SERVICE: CPT | Mod: S$GLB,,, | Performed by: NURSE PRACTITIONER

## 2022-08-04 PROCEDURE — 1160F PR REVIEW ALL MEDS BY PRESCRIBER/CLIN PHARMACIST DOCUMENTED: ICD-10-PCS | Mod: CPTII,S$GLB,, | Performed by: NURSE PRACTITIONER

## 2022-08-04 PROCEDURE — 3077F SYST BP >= 140 MM HG: CPT | Mod: CPTII,S$GLB,, | Performed by: NURSE PRACTITIONER

## 2022-08-04 PROCEDURE — 84165 PROTEIN E-PHORESIS SERUM: CPT | Performed by: NURSE PRACTITIONER

## 2022-08-04 NOTE — PROGRESS NOTES
"   SECTION OF HEMATOLOGY AND BONE MARROW TRANSPLANT  Return Patient Visit   08/04/2022    CHIEF COMPLAINT:   Chief Complaint   Patient presents with    Results     MGUS f/u     HISTORY OF PRESENT ILLNESS: Per primary Oncologist     Mr Hurst is an 81 y.o. 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 He has chronic anemia with Hb in the 12s range for at least 9 years. CBC on 1/6/2020 showed WBC 6.49, Hb 12.5, plt count 367. CMP showed creatinine of 1.6 (chronic), calcium normal 9.9, Protein 8.7, albumin 3.8. workup for the protein gap included an SPEP showing a monoclonal protein peak in gamma 1.07 g/dL. IFX showed IgM lambda monoclonal band. UPEP is negative for paraprotein bands. HIV, hep B and Hep C negative. at that time denied  weight loss, malaise, lightheadedness, dizziness, change in vision, headaches, fatigue. Chronic arthritic pain in hips and knees has recently improved.   2. Labs on 2/11/20 showed IgM 1214. Lambda 13.38, kappa 3.57, L/K ratio 3.75  3. CT C/A/P on 2/18/20 showed "Low-density bladder wall thickening near the dome of the urinary bladder measuring approximately 1.9 x 1.7 x 2.7 cm in size.  Further evaluation with cystoscopy is recommended. Fatty changes of the liver. No adenopathy identified. Prominent coronary atherosclerosis. 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 Patient did not show for BMBx appointment on 2/20/20."  Attempted to contact pt multiple times for telemed appt April 2020 with so success however presents in person today.       was seen previously by , he lost follow up. Last seen on 03/2021, today scheduled as a new patient under benign hematology. Overall doing good except fatigue. No change in clinical status since our last appt.  Using cane for short distance and wheel chair for long distance.   Noted elevated BP at clinic, " "asymptomatic, not taken his morning medication. Labs not done prior today's visit, will draw lab following visit.  PAST MEDICAL HISTORY:   Past Medical History:   Diagnosis Date    Hyperlipidemia     Hypertension        PAST SURGICAL HISTORY:   Past Surgical History:   Procedure Laterality Date    LEG SURGERY      PROSTATE SURGERY         PAST SOCIAL HISTORY:   reports that he has never smoked. He has never used smokeless tobacco. He reports that he does not drink alcohol and does not use drugs.    FAMILY HISTORY:  Family History   Problem Relation Age of Onset    Diabetes Sister     Heart disease Sister     Aneurysm Brother     Cancer Brother        CURRENT MEDICATIONS:   Current Outpatient Medications   Medication Sig    allopurinoL (ZYLOPRIM) 100 MG tablet TAKE 1 TABLET BY MOUTH EVERY DAY    cholecalciferol, vitamin D3, 1,250 mcg (50,000 unit) capsule TAKE 1 CAPSULE BY MOUTH ONE TIME PER WEEK    levocetirizine (XYZAL) 5 MG tablet Take 1 tablet (5 mg total) by mouth every evening.    losartan (COZAAR) 100 MG tablet Take 1 tablet (100 mg total) by mouth once daily.    NIFEdipine (PROCARDIA-XL) 90 MG (OSM) 24 hr tablet Take 1 tablet (90 mg total) by mouth once daily.    simvastatin (ZOCOR) 40 MG tablet TAKE 1 TABLET BY MOUTH EVERY DAY IN THE EVENING     No current facility-administered medications for this visit.     ALLERGIES:   Review of patient's allergies indicates:  No Known Allergies        REVIEW OF SYSTEMS:   Review of Systems - see HPI    PHYSICAL EXAM:   Vitals:    08/04/22 1343   BP: (!) 180/77   Pulse: 82   Resp: 17   Temp: 98.2 °F (36.8 °C)   SpO2: 96%   Weight: 110 kg (242 lb 8.1 oz)   Height: 6' 2" (1.88 m)   PainSc: 0-No pain     General - well developed, well nourished, no apparent distress  HEENT - oropharynx clear  Chest and Lung - clear to auscultation bilaterally   Cardiovascular - RRR with no MGR, normal S1 and S2  Abdomen-  soft, nontender, no palpable hepatomegaly or " splenomegaly  Lymph - no palpable lymphadenopathy  Heme - no bruising, petechiae, pallor  Skin - no rashes or lesions  Psych - appropriate mood and affect      ECOG Performance Status: (foot note - ECOG PS provided by [employer]cology [employer]) 1 - Symptomatic but completely ambulatory    Karnofsky Performance Score:  90%- Able to Carry on Normal Activity: Minor Symptoms of Disease  DATA:   Lab Results   Component Value Date    WBC 9.31 08/04/2022    HGB 11.3 (L) 08/04/2022    HCT 35.0 (L) 08/04/2022    MCV 86 08/04/2022     08/04/2022       Gran # (ANC)   Date Value Ref Range Status   08/04/2022 4.6 1.8 - 7.7 K/uL Final     Gran %   Date Value Ref Range Status   08/04/2022 48.9 38.0 - 73.0 % Final     CMP  Sodium   Date Value Ref Range Status   08/04/2022 138 136 - 145 mmol/L Final     Potassium   Date Value Ref Range Status   08/04/2022 4.8 3.5 - 5.1 mmol/L Final     Chloride   Date Value Ref Range Status   08/04/2022 107 95 - 110 mmol/L Final     CO2   Date Value Ref Range Status   08/04/2022 23 23 - 29 mmol/L Final     Glucose   Date Value Ref Range Status   08/04/2022 107 70 - 110 mg/dL Final     BUN   Date Value Ref Range Status   08/04/2022 22 8 - 23 mg/dL Final     Creatinine   Date Value Ref Range Status   08/04/2022 1.6 (H) 0.5 - 1.4 mg/dL Final     Calcium   Date Value Ref Range Status   08/04/2022 9.6 8.7 - 10.5 mg/dL Final     Total Protein   Date Value Ref Range Status   08/04/2022 8.7 (H) 6.0 - 8.4 g/dL Final     Albumin   Date Value Ref Range Status   08/04/2022 3.8 3.5 - 5.2 g/dL Final     Total Bilirubin   Date Value Ref Range Status   08/04/2022 0.5 0.1 - 1.0 mg/dL Final     Comment:     For infants and newborns, interpretation of results should be based  on gestational age, weight and in agreement with clinical  observations.    Premature Infant recommended reference ranges:  Up to 24 hours.............<8.0 mg/dL  Up to 48 hours............<12.0 mg/dL  3-5  days..................<15.0 mg/dL  6-29 days.................<15.0 mg/dL       Alkaline Phosphatase   Date Value Ref Range Status   08/04/2022 138 (H) 55 - 135 U/L Final     AST   Date Value Ref Range Status   08/04/2022 22 10 - 40 U/L Final     ALT   Date Value Ref Range Status   08/04/2022 27 10 - 44 U/L Final     Anion Gap   Date Value Ref Range Status   08/04/2022 8 8 - 16 mmol/L Final     eGFR if    Date Value Ref Range Status   03/09/2021 45.7 (A) >60 mL/min/1.73 m^2 Final     eGFR if non    Date Value Ref Range Status   03/09/2021 39.5 (A) >60 mL/min/1.73 m^2 Final     Comment:     Calculation used to obtain the estimated glomerular filtration  rate (eGFR) is the CKD-EPI equation.        IgG   Date Value Ref Range Status   08/04/2022 1104 650 - 1600 mg/dL Final     Comment:     IgG Cord Blood Reference Range: 650-1600 mg/dL.     IgA   Date Value Ref Range Status   08/04/2022 308 40 - 350 mg/dL Final     Comment:     IgA Cord Blood Reference Range: <5 mg/dL.     IgM   Date Value Ref Range Status   08/04/2022 1363 (H) 50 - 300 mg/dL Final     Comment:     IgM Cord Blood Reference Range: <25 mg/dL.     Kappa Free Light Chains   Date Value Ref Range Status   03/09/2021 4.01 (H) 0.33 - 1.94 mg/dL Final   08/11/2020 3.67 (H) 0.33 - 1.94 mg/dL Final   04/03/2020 3.39 (H) 0.33 - 1.94 mg/dL Final     Lambda Free Light Chains   Date Value Ref Range Status   03/09/2021 13.57 (H) 0.57 - 2.63 mg/dL Final   08/11/2020 17.54 (H) 0.57 - 2.63 mg/dL Final   04/03/2020 13.09 (H) 0.57 - 2.63 mg/dL Final     Kappa/Lambda FLC Ratio   Date Value Ref Range Status   03/09/2021 0.30 0.26 - 1.65 Final     Comment:     Undetected antigen excess is a rare event but cannot   be excluded. If these free light chain results do not   agree with other clinical or laboratory findings or   if the sample is from a patient that has previously   demonstrated antigen excess, discuss with the testing   laboratory.    Results should always be interpreted in conjunction   with other laboratory tests and clinical evidence.     08/11/2020 0.21 (L) 0.26 - 1.65 Final   04/03/2020 0.26 0.26 - 1.65 Final     Pathologist Interpretation SPE  Pathologist Interpretation SPE  Collected: 08/11/20 0741   Result status: Final   Resulting lab: [facility]   Value: REVIEWED   Comment: Electronically reviewed and signed by:   Jillian Ortiz D.O.   Signed on 08/12/20 at 16:10   Normal total protein. There is a paraprotein band in near-gamma =   0.95 g/dL (previously, 1.07 g/dL)   Interpreted by Jillian Ortiz D.O.    *Additional information available - comment           ASSESSMENT AND PLAN:   Encounter Diagnosis   Name Primary?    MGUS (monoclonal gammopathy of unknown significance) Yes       -ct c/a/p feb 2020 notable for no adenopathy/HSM; noted small bladder mass; ct not done with contrast so of limited utility in assessing Lymphoproliferative disorder; consider pet if symptoms develop  -no showed to march 2020 bone marrow biopsy;    -asymptomatic; IgM level stable from feb to April 2020  --Normal sflcr and paraprotein <1.5   -Ok to defer more imaging and marrow  In light no clinical or biochemical progression, advanced age and pts wishes   IgM MGUS   ckd from htn, pad, seen nephrologist on 10/2020  Mild anemnia stable from >10 yrs likely from ckd   In  Light of age, comorbidites and no overt progression can defer marrow and recommend fu in 6 months (vs annualy since no marrow)  24 hr urine studies 2020 with no paraprotein and moderate proteinura   Certainly could have low grade Lymphoproliferative disorder but given asmptomatic/stable labs would likely monitor anyway   Today's labs pending. Will f/u after lab result.  Bladder mass   -noted on feb 2020 CT; has had urology fu     Anemia  -Baseline stable  -Likely anemia of ckd  -nutritonal eval normal     BMT Chart Routing      Follow up with physician . /sara    Follow up with MATIAS 6 months.   Infusion scheduling note    Injection scheduling note    Labs CBC, CMP, free light chains, immunoglobulins and SPEP   Lab interval:     Imaging    Pharmacy appointment    Other referrals        Lidia Adler NP  Hematology/Oncology/BMT

## 2022-08-05 LAB
ALBUMIN SERPL ELPH-MCNC: 4.08 G/DL (ref 3.35–5.55)
ALPHA1 GLOB SERPL ELPH-MCNC: 0.37 G/DL (ref 0.17–0.41)
ALPHA2 GLOB SERPL ELPH-MCNC: 1.22 G/DL (ref 0.43–0.99)
B-GLOBULIN SERPL ELPH-MCNC: 0.95 G/DL (ref 0.5–1.1)
GAMMA GLOB SERPL ELPH-MCNC: 1.87 G/DL (ref 0.67–1.58)
INTERPRETATION SERPL IFE-IMP: NORMAL
KAPPA LC SER QL IA: 4.27 MG/DL (ref 0.33–1.94)
KAPPA LC/LAMBDA SER IA: 0.28 (ref 0.26–1.65)
LAMBDA LC SER QL IA: 15.12 MG/DL (ref 0.57–2.63)
PATHOLOGIST INTERPRETATION IFE: NORMAL
PATHOLOGIST INTERPRETATION SPE: NORMAL
PROT SERPL-MCNC: 8.5 G/DL (ref 6–8.4)

## 2022-08-18 ENCOUNTER — IMMUNIZATION (OUTPATIENT)
Dept: PHARMACY | Facility: CLINIC | Age: 82
End: 2022-08-18
Payer: MEDICARE

## 2022-08-18 DIAGNOSIS — Z23 NEED FOR VACCINATION: Primary | ICD-10-CM

## 2022-09-22 ENCOUNTER — TELEPHONE (OUTPATIENT)
Dept: ADMINISTRATIVE | Facility: HOSPITAL | Age: 82
End: 2022-09-22
Payer: MEDICARE

## 2022-09-22 ENCOUNTER — PATIENT OUTREACH (OUTPATIENT)
Dept: ADMINISTRATIVE | Facility: HOSPITAL | Age: 82
End: 2022-09-22
Payer: MEDICARE

## 2022-09-23 ENCOUNTER — TELEPHONE (OUTPATIENT)
Dept: ADMINISTRATIVE | Facility: HOSPITAL | Age: 82
End: 2022-09-23
Payer: MEDICARE

## 2022-09-23 ENCOUNTER — PATIENT OUTREACH (OUTPATIENT)
Dept: ADMINISTRATIVE | Facility: HOSPITAL | Age: 82
End: 2022-09-23
Payer: MEDICARE

## 2022-09-23 VITALS — DIASTOLIC BLOOD PRESSURE: 68 MMHG | SYSTOLIC BLOOD PRESSURE: 165 MMHG

## 2022-09-26 ENCOUNTER — LAB VISIT (OUTPATIENT)
Dept: LAB | Facility: OTHER | Age: 82
End: 2022-09-26
Attending: UROLOGY
Payer: MEDICARE

## 2022-09-26 ENCOUNTER — OFFICE VISIT (OUTPATIENT)
Dept: UROLOGY | Facility: CLINIC | Age: 82
End: 2022-09-26
Attending: UROLOGY
Payer: MEDICARE

## 2022-09-26 VITALS
SYSTOLIC BLOOD PRESSURE: 162 MMHG | DIASTOLIC BLOOD PRESSURE: 71 MMHG | BODY MASS INDEX: 30.81 KG/M2 | HEART RATE: 79 BPM | WEIGHT: 240 LBS

## 2022-09-26 DIAGNOSIS — C61 PROSTATE CANCER: Primary | ICD-10-CM

## 2022-09-26 DIAGNOSIS — C61 PROSTATE CANCER: ICD-10-CM

## 2022-09-26 LAB — COMPLEXED PSA SERPL-MCNC: 1.1 NG/ML (ref 0–4)

## 2022-09-26 PROCEDURE — 36415 COLL VENOUS BLD VENIPUNCTURE: CPT | Performed by: UROLOGY

## 2022-09-26 PROCEDURE — 1159F PR MEDICATION LIST DOCUMENTED IN MEDICAL RECORD: ICD-10-PCS | Mod: CPTII,S$GLB,, | Performed by: UROLOGY

## 2022-09-26 PROCEDURE — 99204 PR OFFICE/OUTPT VISIT, NEW, LEVL IV, 45-59 MIN: ICD-10-PCS | Mod: S$GLB,,, | Performed by: UROLOGY

## 2022-09-26 PROCEDURE — 84153 ASSAY OF PSA TOTAL: CPT | Performed by: UROLOGY

## 2022-09-26 PROCEDURE — 3077F SYST BP >= 140 MM HG: CPT | Mod: CPTII,S$GLB,, | Performed by: UROLOGY

## 2022-09-26 PROCEDURE — 99204 OFFICE O/P NEW MOD 45 MIN: CPT | Mod: S$GLB,,, | Performed by: UROLOGY

## 2022-09-26 PROCEDURE — 1160F PR REVIEW ALL MEDS BY PRESCRIBER/CLIN PHARMACIST DOCUMENTED: ICD-10-PCS | Mod: CPTII,S$GLB,, | Performed by: UROLOGY

## 2022-09-26 PROCEDURE — 3078F DIAST BP <80 MM HG: CPT | Mod: CPTII,S$GLB,, | Performed by: UROLOGY

## 2022-09-26 PROCEDURE — 3078F PR MOST RECENT DIASTOLIC BLOOD PRESSURE < 80 MM HG: ICD-10-PCS | Mod: CPTII,S$GLB,, | Performed by: UROLOGY

## 2022-09-26 PROCEDURE — 1160F RVW MEDS BY RX/DR IN RCRD: CPT | Mod: CPTII,S$GLB,, | Performed by: UROLOGY

## 2022-09-26 PROCEDURE — 1159F MED LIST DOCD IN RCRD: CPT | Mod: CPTII,S$GLB,, | Performed by: UROLOGY

## 2022-09-26 PROCEDURE — 3077F PR MOST RECENT SYSTOLIC BLOOD PRESSURE >= 140 MM HG: ICD-10-PCS | Mod: CPTII,S$GLB,, | Performed by: UROLOGY

## 2022-09-26 NOTE — PROGRESS NOTES
Subjective:      Tip Hurst is a 81 y.o. male who returns today for a f/u    He has history of prostate cancer s/p prostatectomy in 2006. No known recurrence. No adjuvant treatments reported. Path unknown.  He has had biochemical recurrence per PSA testing beginning in 2010 with a very slow rise in PSA in the interim.      In 2018 he had incidental finding of a possible bladder lesion on ultrasound.  Cystoscopy was normal at that time.    Today he has no c/o. Last PSA was in 2020 and was stable at 0.39. His AUASS is 5.    The following portions of the patient's history were reviewed and updated as appropriate: allergies, current medications, past family history, past medical history, past social history, past surgical history and problem list.    Review of Systems  A comprehensive multipoint review of systems was negative except as otherwise stated in the HPI.     Objective:   Vitals: BP (!) 162/71   Pulse 79   Wt 108.9 kg (240 lb)   BMI 30.81 kg/m²     Physical Exam   General: alert and oriented, no acute distress  Respiratory: Symmetric expansion, non-labored breathing  Neuro: no gross deficits  Psych: normal judgment and insight, normal mood/affect and non-anxious    Lab Review     Lab Results   Component Value Date    WBC 9.31 08/04/2022    HGB 11.3 (L) 08/04/2022    HCT 35.0 (L) 08/04/2022    MCV 86 08/04/2022     08/04/2022     Lab Results   Component Value Date    CREATININE 1.6 (H) 08/04/2022    BUN 22 08/04/2022     Component PSA, SCREEN PSA DIAGNOSTIC   Latest Ref Rng & Units 0.00 - 4.00 ng/mL 0.00 - 4.00 ng/mL   3/18/2020  0.39   1/16/2020  0.41   9/13/2019  0.35   3/14/2019  0.31   8/21/2018  0.25   9/5/2013 0.26    7/23/2013 0.25    8/21/2012 0.18    5/4/2011 0.03    9/21/2010 0.02    9/25/2009 <0.01    8/6/2008 <0.01    8/9/2007 <0.1    7/13/2006 6.6 (H)    4/19/2005 4.2 (H)        Assessment and Plan:   1. Prostate cancer  -- Biochemical recurrence with very slow rise in PSA over the  last 12 years.  -- Previously decision made to not pursue additional treatment such as ADT at this time (or hopefully ever)  -- PSA today; plan for repeat in 12 mos if stable

## 2022-10-06 ENCOUNTER — OFFICE VISIT (OUTPATIENT)
Dept: UROLOGY | Facility: CLINIC | Age: 82
End: 2022-10-06
Attending: UROLOGY
Payer: MEDICARE

## 2022-10-06 VITALS — DIASTOLIC BLOOD PRESSURE: 74 MMHG | HEART RATE: 84 BPM | SYSTOLIC BLOOD PRESSURE: 192 MMHG

## 2022-10-06 DIAGNOSIS — C61 PROSTATE CANCER: Primary | ICD-10-CM

## 2022-10-06 PROCEDURE — 99214 PR OFFICE/OUTPT VISIT, EST, LEVL IV, 30-39 MIN: ICD-10-PCS | Mod: S$GLB,,, | Performed by: UROLOGY

## 2022-10-06 PROCEDURE — 1160F PR REVIEW ALL MEDS BY PRESCRIBER/CLIN PHARMACIST DOCUMENTED: ICD-10-PCS | Mod: CPTII,S$GLB,, | Performed by: UROLOGY

## 2022-10-06 PROCEDURE — 1159F PR MEDICATION LIST DOCUMENTED IN MEDICAL RECORD: ICD-10-PCS | Mod: CPTII,S$GLB,, | Performed by: UROLOGY

## 2022-10-06 PROCEDURE — 3078F DIAST BP <80 MM HG: CPT | Mod: CPTII,S$GLB,, | Performed by: UROLOGY

## 2022-10-06 PROCEDURE — 1101F PR PT FALLS ASSESS DOC 0-1 FALLS W/OUT INJ PAST YR: ICD-10-PCS | Mod: CPTII,S$GLB,, | Performed by: UROLOGY

## 2022-10-06 PROCEDURE — 3288F FALL RISK ASSESSMENT DOCD: CPT | Mod: CPTII,S$GLB,, | Performed by: UROLOGY

## 2022-10-06 PROCEDURE — 3288F PR FALLS RISK ASSESSMENT DOCUMENTED: ICD-10-PCS | Mod: CPTII,S$GLB,, | Performed by: UROLOGY

## 2022-10-06 PROCEDURE — 1126F AMNT PAIN NOTED NONE PRSNT: CPT | Mod: CPTII,S$GLB,, | Performed by: UROLOGY

## 2022-10-06 PROCEDURE — 3077F PR MOST RECENT SYSTOLIC BLOOD PRESSURE >= 140 MM HG: ICD-10-PCS | Mod: CPTII,S$GLB,, | Performed by: UROLOGY

## 2022-10-06 PROCEDURE — 1101F PT FALLS ASSESS-DOCD LE1/YR: CPT | Mod: CPTII,S$GLB,, | Performed by: UROLOGY

## 2022-10-06 PROCEDURE — 99214 OFFICE O/P EST MOD 30 MIN: CPT | Mod: S$GLB,,, | Performed by: UROLOGY

## 2022-10-06 PROCEDURE — 1160F RVW MEDS BY RX/DR IN RCRD: CPT | Mod: CPTII,S$GLB,, | Performed by: UROLOGY

## 2022-10-06 PROCEDURE — 3077F SYST BP >= 140 MM HG: CPT | Mod: CPTII,S$GLB,, | Performed by: UROLOGY

## 2022-10-06 PROCEDURE — 1159F MED LIST DOCD IN RCRD: CPT | Mod: CPTII,S$GLB,, | Performed by: UROLOGY

## 2022-10-06 PROCEDURE — 3078F PR MOST RECENT DIASTOLIC BLOOD PRESSURE < 80 MM HG: ICD-10-PCS | Mod: CPTII,S$GLB,, | Performed by: UROLOGY

## 2022-10-06 PROCEDURE — 1126F PR PAIN SEVERITY QUANTIFIED, NO PAIN PRESENT: ICD-10-PCS | Mod: CPTII,S$GLB,, | Performed by: UROLOGY

## 2022-10-06 NOTE — PROGRESS NOTES
Subjective:      Tip Hurst is a 81 y.o. male who returns today for a f/u    He has history of prostate cancer s/p prostatectomy in 2006. No known recurrence. No adjuvant treatments reported. Path unknown.  He has had biochemical recurrence per PSA testing beginning in 2010 with a very slow rise in PSA in the interim.      In 2018 he had incidental finding of a possible bladder lesion on ultrasound.  Cystoscopy was normal at that time.    Today he has no c/o. Here to review recent PSA.    The following portions of the patient's history were reviewed and updated as appropriate: allergies, current medications, past family history, past medical history, past social history, past surgical history and problem list.    Review of Systems  A comprehensive multipoint review of systems was negative except as otherwise stated in the HPI.     Objective:   Vitals: BP (!) 192/74 Comment: MANUAL  Pulse 84     Physical Exam   General: alert and oriented, no acute distress  Respiratory: Symmetric expansion, non-labored breathing  Neuro: no gross deficits  Psych: normal judgment and insight, normal mood/affect and non-anxious    Lab Review     Lab Results   Component Value Date    WBC 9.31 08/04/2022    HGB 11.3 (L) 08/04/2022    HCT 35.0 (L) 08/04/2022    MCV 86 08/04/2022     08/04/2022     Lab Results   Component Value Date    CREATININE 1.6 (H) 08/04/2022    BUN 22 08/04/2022     Component PSA, SCREEN PSA DIAGNOSTIC   Latest Ref Rng & Units 0.00 - 4.00 ng/mL 0.00 - 4.00 ng/mL   9/26/2022  1.1   3/18/2020  0.39   1/16/2020  0.41   9/13/2019  0.35   3/14/2019  0.31   8/21/2018  0.25   9/5/2013 0.26    7/23/2013 0.25    8/21/2012 0.18    5/4/2011 0.03    9/21/2010 0.02    9/25/2009 <0.01    8/6/2008 <0.01    8/9/2007 <0.1    7/13/2006 6.6 (H)    4/19/2005 4.2 (H)        Assessment and Plan:   1. Prostate cancer  -- Biochemical recurrence with very slow rise in PSA over the last 12 years.  -- Previously decision made to  not pursue additional treatment such as ADT at this time (or hopefully ever)  -- PSA in 6 mos

## 2022-10-11 ENCOUNTER — HOSPITAL ENCOUNTER (OUTPATIENT)
Dept: RADIOLOGY | Facility: HOSPITAL | Age: 82
Discharge: HOME | End: 2022-10-11
Attending: INTERNAL MEDICINE
Payer: MEDICARE

## 2022-10-11 ENCOUNTER — TELEPHONE (OUTPATIENT)
Dept: INTERNAL MEDICINE | Facility: CLINIC | Age: 82
End: 2022-10-11
Payer: MEDICARE

## 2022-10-11 DIAGNOSIS — M79.89 RIGHT LEG SWELLING: ICD-10-CM

## 2022-10-11 DIAGNOSIS — R60.0 LOCALIZED EDEMA: ICD-10-CM

## 2022-10-11 PROCEDURE — 93971 US LOWER EXTREMITY VEINS RIGHT: ICD-10-PCS | Mod: 26,RT,, | Performed by: RADIOLOGY

## 2022-10-11 PROCEDURE — 93971 EXTREMITY STUDY: CPT | Mod: 26,RT,, | Performed by: RADIOLOGY

## 2022-10-11 PROCEDURE — 93971 EXTREMITY STUDY: CPT | Mod: TC,RT

## 2022-10-11 NOTE — LETTER
October 12, 2022    Tip Hurst  [address]   [place] [address]             [facility]  [address]  [facility] [address]  Phone: [phone]  Fax: [phone] [facility] Mr. Tip Hurst    We have made several attempts at contacting you about your recent ultrasound results. Your health is very important to us!    Please review your result note below:    No evidence of deep venous thrombosis in the right lower extremity.     If you have any questions or concerns, please do not hesitate to contact us at [phone].    Thanks for choosing [facility]! [place], [place]

## 2022-10-11 NOTE — TELEPHONE ENCOUNTER
----- Message from Moe Gallardo MD sent at 10/11/2022  3:29 PM CDT -----  Please notify patient of results:  No evidence of deep venous thrombosis in the right lower extremity.

## 2022-10-12 NOTE — TELEPHONE ENCOUNTER
10/12 Second Attempt  to call Mr. Hurst and inform him of the message from Dr. Gallardo. Unable to reach at provided numbers.       From Dr. Gallardo  Please notify patient of results:  No evidence of deep venous thrombosis in the right lower extremity.

## 2022-11-01 ENCOUNTER — PATIENT OUTREACH (OUTPATIENT)
Dept: ADMINISTRATIVE | Facility: HOSPITAL | Age: 82
End: 2022-11-01
Payer: MEDICARE

## 2023-01-17 RX ORDER — ASPIRIN 325 MG
50000 TABLET, DELAYED RELEASE (ENTERIC COATED) ORAL
Qty: 12 CAPSULE | Refills: 3 | Status: SHIPPED | OUTPATIENT
Start: 2023-01-17

## 2023-01-17 NOTE — TELEPHONE ENCOUNTER
----- Message from Ant Dugan sent at 1/13/2023  4:14 PM CST -----  Name of Who is Calling: ESTEFANIA ROBISON [[id number]]            What is the request in detail: Patient is requesting call back bc he needs his viatims d3 but I do not see prescrption              Can the clinic reply by [facility]: no              What Number to Call Back if not in [facility]: [phone]

## 2023-01-19 NOTE — TELEPHONE ENCOUNTER
Called pt. No answer, left VM that his Vit D3 was sent to the pharmacy on 1/17 and if he hasn't picked it up to check with them at [id number] [place] [facility] [address]

## 2023-01-20 ENCOUNTER — OFFICE VISIT (OUTPATIENT)
Dept: INTERNAL MEDICINE | Facility: CLINIC | Age: 83
End: 2023-01-20
Payer: MEDICARE

## 2023-01-20 VITALS
HEIGHT: 74 IN | SYSTOLIC BLOOD PRESSURE: 172 MMHG | BODY MASS INDEX: 31.55 KG/M2 | DIASTOLIC BLOOD PRESSURE: 90 MMHG | OXYGEN SATURATION: 99 % | HEART RATE: 95 BPM | WEIGHT: 245.81 LBS

## 2023-01-20 DIAGNOSIS — I10 HTN (HYPERTENSION), BENIGN: ICD-10-CM

## 2023-01-20 DIAGNOSIS — L98.9 SKIN LESION: Primary | ICD-10-CM

## 2023-01-20 PROCEDURE — 3288F PR FALLS RISK ASSESSMENT DOCUMENTED: ICD-10-PCS | Mod: CPTII,S$GLB,, | Performed by: PHYSICIAN ASSISTANT

## 2023-01-20 PROCEDURE — 1126F PR PAIN SEVERITY QUANTIFIED, NO PAIN PRESENT: ICD-10-PCS | Mod: CPTII,S$GLB,, | Performed by: PHYSICIAN ASSISTANT

## 2023-01-20 PROCEDURE — 3080F DIAST BP >= 90 MM HG: CPT | Mod: CPTII,S$GLB,, | Performed by: PHYSICIAN ASSISTANT

## 2023-01-20 PROCEDURE — 99999 PR PBB SHADOW E&M-EST. PATIENT-LVL III: ICD-10-PCS | Mod: PBBFAC,,, | Performed by: PHYSICIAN ASSISTANT

## 2023-01-20 PROCEDURE — 3288F FALL RISK ASSESSMENT DOCD: CPT | Mod: CPTII,S$GLB,, | Performed by: PHYSICIAN ASSISTANT

## 2023-01-20 PROCEDURE — 99213 OFFICE O/P EST LOW 20 MIN: CPT | Mod: S$GLB,,, | Performed by: PHYSICIAN ASSISTANT

## 2023-01-20 PROCEDURE — 1159F MED LIST DOCD IN RCRD: CPT | Mod: CPTII,S$GLB,, | Performed by: PHYSICIAN ASSISTANT

## 2023-01-20 PROCEDURE — 1101F PT FALLS ASSESS-DOCD LE1/YR: CPT | Mod: CPTII,S$GLB,, | Performed by: PHYSICIAN ASSISTANT

## 2023-01-20 PROCEDURE — 3080F PR MOST RECENT DIASTOLIC BLOOD PRESSURE >= 90 MM HG: ICD-10-PCS | Mod: CPTII,S$GLB,, | Performed by: PHYSICIAN ASSISTANT

## 2023-01-20 PROCEDURE — 1101F PR PT FALLS ASSESS DOC 0-1 FALLS W/OUT INJ PAST YR: ICD-10-PCS | Mod: CPTII,S$GLB,, | Performed by: PHYSICIAN ASSISTANT

## 2023-01-20 PROCEDURE — 99213 PR OFFICE/OUTPT VISIT, EST, LEVL III, 20-29 MIN: ICD-10-PCS | Mod: S$GLB,,, | Performed by: PHYSICIAN ASSISTANT

## 2023-01-20 PROCEDURE — 3077F PR MOST RECENT SYSTOLIC BLOOD PRESSURE >= 140 MM HG: ICD-10-PCS | Mod: CPTII,S$GLB,, | Performed by: PHYSICIAN ASSISTANT

## 2023-01-20 PROCEDURE — 99999 PR PBB SHADOW E&M-EST. PATIENT-LVL III: CPT | Mod: PBBFAC,,, | Performed by: PHYSICIAN ASSISTANT

## 2023-01-20 PROCEDURE — 3077F SYST BP >= 140 MM HG: CPT | Mod: CPTII,S$GLB,, | Performed by: PHYSICIAN ASSISTANT

## 2023-01-20 PROCEDURE — 1159F PR MEDICATION LIST DOCUMENTED IN MEDICAL RECORD: ICD-10-PCS | Mod: CPTII,S$GLB,, | Performed by: PHYSICIAN ASSISTANT

## 2023-01-20 PROCEDURE — 1126F AMNT PAIN NOTED NONE PRSNT: CPT | Mod: CPTII,S$GLB,, | Performed by: PHYSICIAN ASSISTANT

## 2023-01-20 NOTE — PROGRESS NOTES
INTERNAL MEDICINE URGENT VISIT NOTE    CHIEF COMPLAINT     Chief Complaint   Patient presents with    Skin Problem     Boil on chest       HPI     Tip Hurst is a 82 y.o. male who presents for an urgent visit today.    PCP is Moe Gallardo MD, patient is new to me.     Patient presents with complaints of skin lesion to the left upper abdomen. This skin lesion has been present for over one year. His family noticed it for the first time and told him to come get it looked at by his provider. He has no pain. Sometimes it is itchy. He denies bleeding, purulence or other lesions. He has never seen a dermatologist that he is aware of.     He admits to [facility] and admits that he is compliant with his BP meds.     Past Medical History:  Past Medical History:   Diagnosis Date    Hyperlipidemia     Hypertension        Home Medications:  Prior to Admission medications    Medication Sig Start Date End Date Taking? Authorizing Provider   allopurinoL (ZYLOPRIM) 100 MG tablet TAKE 1 TABLET BY MOUTH EVERY DAY 9/12/22  Yes Moe Gallardo MD   cholecalciferol, vitamin D3, 1,250 mcg (50,000 unit) capsule Take 1 capsule (50,000 Units total) by mouth every 7 days. 1/17/23  Yes Moe Gallardo MD   levocetirizine (XYZAL) 5 MG tablet Take 1 tablet (5 mg total) by mouth every evening. 6/28/22 6/28/23 Yes Moe Gallardo MD   losartan (COZAAR) 100 MG tablet Take 1 tablet (100 mg total) by mouth once daily. 6/20/22  Yes Moe Gallardo MD   NIFEdipine (PROCARDIA-XL) 90 MG (OSM) 24 hr tablet Take 1 tablet (90 mg total) by mouth once daily. 6/28/22  Yes Moe Gallardo MD   simvastatin (ZOCOR) 40 MG tablet TAKE 1 TABLET BY MOUTH EVERY DAY IN THE EVENING 4/11/22  Yes Moe Gallardo MD   sars-cov-2, covid-19, (MODERNA COVID-19) 50 mcg/0.25 ml injection (BOOSTER) Inject into the muscle. 8/18/22          Review of Systems:  Review of Systems    Health Maintainence:   Immunizations:  Health Maintenance         Date  "Due Completion Date    Hemoglobin A1c (Prediabetes) 01/06/2021 1/6/2020    Influenza Vaccine (1) 09/01/2022 11/19/2019    Shingles Vaccine (2 of 2) 10/13/2022 8/18/2022    COVID-19 Vaccine (4 - Booster for Pfizer series) 10/13/2022 8/18/2022    Lipid Panel 01/06/2025 1/6/2020    TETANUS VACCINE 06/22/2028 6/22/2018             PHYSICAL EXAM     BP (!) 172/90 (BP Location: Left arm, Patient Position: Sitting, BP Method: Large (Manual))   Pulse 95   Ht 6' 2" (1.88 m)   Wt 111.5 kg (245 lb 13 oz)   SpO2 99%   BMI 31.56 kg/m²     Physical Exam    LABS     Lab Results   Component Value Date    HGBA1C 6.5 (H) 01/06/2020     CMP  Sodium   Date Value Ref Range Status   08/04/2022 138 136 - 145 mmol/L Final     Potassium   Date Value Ref Range Status   08/04/2022 4.8 3.5 - 5.1 mmol/L Final     Chloride   Date Value Ref Range Status   08/04/2022 107 95 - 110 mmol/L Final     CO2   Date Value Ref Range Status   08/04/2022 23 23 - 29 mmol/L Final     Glucose   Date Value Ref Range Status   08/04/2022 107 70 - 110 mg/dL Final     BUN   Date Value Ref Range Status   08/04/2022 22 8 - 23 mg/dL Final     Creatinine   Date Value Ref Range Status   08/04/2022 1.6 (H) 0.5 - 1.4 mg/dL Final     Calcium   Date Value Ref Range Status   08/04/2022 9.6 8.7 - 10.5 mg/dL Final     Total Protein   Date Value Ref Range Status   08/04/2022 8.7 (H) 6.0 - 8.4 g/dL Final     Albumin   Date Value Ref Range Status   08/04/2022 3.8 3.5 - 5.2 g/dL Final     Total Bilirubin   Date Value Ref Range Status   08/04/2022 0.5 0.1 - 1.0 mg/dL Final     Comment:     For infants and newborns, interpretation of results should be based  on gestational age, weight and in agreement with clinical  observations.    Premature Infant recommended reference ranges:  Up to 24 hours.............<8.0 mg/dL  Up to 48 hours............<12.0 mg/dL  3-5 days..................<15.0 mg/dL  6-29 days.................<15.0 mg/dL       Alkaline Phosphatase   Date Value Ref " Range Status   08/04/2022 138 (H) 55 - 135 U/L Final     AST   Date Value Ref Range Status   08/04/2022 22 10 - 40 U/L Final     ALT   Date Value Ref Range Status   08/04/2022 27 10 - 44 U/L Final     Anion Gap   Date Value Ref Range Status   08/04/2022 8 8 - 16 mmol/L Final     eGFR if    Date Value Ref Range Status   03/09/2021 45.7 (A) >60 mL/min/1.73 m^2 Final     eGFR if non    Date Value Ref Range Status   03/09/2021 39.5 (A) >60 mL/min/1.73 m^2 Final     Comment:     Calculation used to obtain the estimated glomerular filtration  rate (eGFR) is the CKD-EPI equation.        Lab Results   Component Value Date    WBC 9.31 08/04/2022    HGB 11.3 (L) 08/04/2022    HCT 35.0 (L) 08/04/2022    MCV 86 08/04/2022     08/04/2022     Lab Results   Component Value Date    CHOL 145 01/06/2020    CHOL 144 06/19/2018    CHOL 145 09/05/2013     Lab Results   Component Value Date    HDL 33 (L) 01/06/2020    HDL 33 (L) 06/19/2018    HDL 34 (L) 09/05/2013     Lab Results   Component Value Date    LDLCALC 94.0 01/06/2020    LDLCALC 90.8 06/19/2018    LDLCALC 92.0 09/05/2013     Lab Results   Component Value Date    TRIG 90 01/06/2020    TRIG 101 06/19/2018    TRIG 95 09/05/2013     Lab Results   Component Value Date    CHOLHDL 22.8 01/06/2020    CHOLHDL 22.9 06/19/2018    CHOLHDL 23.4 09/05/2013     Lab Results   Component Value Date    TSH 3.269 01/06/2020       ASSESSMENT/PLAN     Tip Hurst is a 82 y.o. male     Tip was seen today for skin problem. He has skin lesion that is consistent with seborrheic keratoses -because it is bothering the pt, will refer to derm. Will have him report home BP to ensure his htn is not out of control. He is aware of and amenable to plan.     Diagnoses and all orders for this visit:    Skin lesion  -     Ambulatory referral/consult to Dermatology; Future    HTN (hypertension), benign    Patient was counseled on when and how to seek emergent care.        Lisseth Mena PA-C   Department of Internal Medicine - [facility]   3:20 PM

## 2023-01-24 ENCOUNTER — TELEPHONE (OUTPATIENT)
Dept: DERMATOLOGY | Facility: CLINIC | Age: 83
End: 2023-01-24
Payer: MEDICARE

## 2023-01-24 NOTE — TELEPHONE ENCOUNTER
Rescheduled pt appt for later time. Mr. Lenz verbalized understanding and confirmed.    RD          ----- Message from Avi Baca LPN sent at 1/23/2023  4:44 PM CST -----  Regarding: FW: rescheduling needed  Contact: [phone] Delfin    ----- Message -----  From: Marianne Pimentel  Sent: 1/23/2023   4:14 PM CST  To: Nelda [facility]avides Staff  Subject: rescheduling needed                              ESTEFANIA ROBISON son mona Lenz calling regarding Appointment Access  (message) for rescheduling of time for his appt on 01/27/23.  He want to know if you can change the time to a later time to any time after 10:30.  Mr. Lenz will have to leave [place] to pick pt up from [facility] to bring him to his appt.  call back [phone]

## 2023-01-27 ENCOUNTER — OFFICE VISIT (OUTPATIENT)
Dept: DERMATOLOGY | Facility: CLINIC | Age: 83
End: 2023-01-27
Payer: MEDICARE

## 2023-01-27 DIAGNOSIS — L98.9 SKIN LESION: ICD-10-CM

## 2023-01-27 DIAGNOSIS — D49.9 NEOPLASM: Primary | ICD-10-CM

## 2023-01-27 PROCEDURE — 1126F PR PAIN SEVERITY QUANTIFIED, NO PAIN PRESENT: ICD-10-PCS | Mod: CPTII,S$GLB,, | Performed by: DERMATOLOGY

## 2023-01-27 PROCEDURE — 88305 TISSUE EXAM BY PATHOLOGIST: CPT | Performed by: PATHOLOGY

## 2023-01-27 PROCEDURE — 88341 IMHCHEM/IMCYTCHM EA ADD ANTB: CPT | Mod: 26,,, | Performed by: PATHOLOGY

## 2023-01-27 PROCEDURE — 88342 CHG IMMUNOCYTOCHEMISTRY: ICD-10-PCS | Mod: 26,,, | Performed by: PATHOLOGY

## 2023-01-27 PROCEDURE — 11302 PR SHAV SKIN LES 1.1-2.0 CM TRUNK,ARM,LEG: ICD-10-PCS | Mod: S$GLB,,, | Performed by: DERMATOLOGY

## 2023-01-27 PROCEDURE — 3288F PR FALLS RISK ASSESSMENT DOCUMENTED: ICD-10-PCS | Mod: CPTII,S$GLB,, | Performed by: DERMATOLOGY

## 2023-01-27 PROCEDURE — 88305 TISSUE EXAM BY PATHOLOGIST: ICD-10-PCS | Mod: 26,,, | Performed by: PATHOLOGY

## 2023-01-27 PROCEDURE — 99999 PR PBB SHADOW E&M-EST. PATIENT-LVL III: CPT | Mod: PBBFAC,,, | Performed by: DERMATOLOGY

## 2023-01-27 PROCEDURE — 88342 IMHCHEM/IMCYTCHM 1ST ANTB: CPT | Mod: 26,,, | Performed by: PATHOLOGY

## 2023-01-27 PROCEDURE — 3288F FALL RISK ASSESSMENT DOCD: CPT | Mod: CPTII,S$GLB,, | Performed by: DERMATOLOGY

## 2023-01-27 PROCEDURE — 88341 IMHCHEM/IMCYTCHM EA ADD ANTB: CPT | Mod: 59 | Performed by: PATHOLOGY

## 2023-01-27 PROCEDURE — 88305 TISSUE EXAM BY PATHOLOGIST: CPT | Mod: 26,,, | Performed by: PATHOLOGY

## 2023-01-27 PROCEDURE — 1101F PT FALLS ASSESS-DOCD LE1/YR: CPT | Mod: CPTII,S$GLB,, | Performed by: DERMATOLOGY

## 2023-01-27 PROCEDURE — 99202 PR OFFICE/OUTPT VISIT, NEW, LEVL II, 15-29 MIN: ICD-10-PCS | Mod: 25,S$GLB,, | Performed by: DERMATOLOGY

## 2023-01-27 PROCEDURE — 88342 IMHCHEM/IMCYTCHM 1ST ANTB: CPT | Performed by: PATHOLOGY

## 2023-01-27 PROCEDURE — 1159F MED LIST DOCD IN RCRD: CPT | Mod: CPTII,S$GLB,, | Performed by: DERMATOLOGY

## 2023-01-27 PROCEDURE — 1101F PR PT FALLS ASSESS DOC 0-1 FALLS W/OUT INJ PAST YR: ICD-10-PCS | Mod: CPTII,S$GLB,, | Performed by: DERMATOLOGY

## 2023-01-27 PROCEDURE — 99999 PR PBB SHADOW E&M-EST. PATIENT-LVL III: ICD-10-PCS | Mod: PBBFAC,,, | Performed by: DERMATOLOGY

## 2023-01-27 PROCEDURE — 88341 PR IHC OR ICC EACH ADD'L SINGLE ANTIBODY  STAINPR: ICD-10-PCS | Mod: 26,,, | Performed by: PATHOLOGY

## 2023-01-27 PROCEDURE — 99202 OFFICE O/P NEW SF 15 MIN: CPT | Mod: 25,S$GLB,, | Performed by: DERMATOLOGY

## 2023-01-27 PROCEDURE — 1160F RVW MEDS BY RX/DR IN RCRD: CPT | Mod: CPTII,S$GLB,, | Performed by: DERMATOLOGY

## 2023-01-27 PROCEDURE — 1126F AMNT PAIN NOTED NONE PRSNT: CPT | Mod: CPTII,S$GLB,, | Performed by: DERMATOLOGY

## 2023-01-27 PROCEDURE — 1159F PR MEDICATION LIST DOCUMENTED IN MEDICAL RECORD: ICD-10-PCS | Mod: CPTII,S$GLB,, | Performed by: DERMATOLOGY

## 2023-01-27 PROCEDURE — 1160F PR REVIEW ALL MEDS BY PRESCRIBER/CLIN PHARMACIST DOCUMENTED: ICD-10-PCS | Mod: CPTII,S$GLB,, | Performed by: DERMATOLOGY

## 2023-01-27 PROCEDURE — 11302 SHAVE SKIN LESION 1.1-2.0 CM: CPT | Mod: S$GLB,,, | Performed by: DERMATOLOGY

## 2023-01-27 NOTE — PATIENT INSTRUCTIONS
Shave Biopsy Wound Care    Your doctor has performed a shave biopsy today.  A band aid and vaseline ointment has been placed over the site.  This should remain in place for NO LONGER THAN 48 hours.  It is fine to remove the bandaid after 24 hours, if the area is no longer bleeding. It is recommended that you keep the area dry (do not wet)) for the first 24 hours.  After 24 hours, wash the area with warm soap and water and apply Vaseline jelly.  Many patients prefer to use Neosporin or Bacitracin ointment.  This is acceptable; however, know that you can develop an allergy to this medication even if you have used it safely for years.  It is important to keep the area moist.  Letting it dry out and get air slows healing time, and will worsen the scar.        If you notice increasing redness, tenderness, pain, or yellow drainage at the biopsy site, please notify your doctor.  These are signs of an infection.    If your biopsy site is bleeding, apply firm pressure for 15 minutes straight.  Repeat for another 15 minutes, if it is still bleeding.   If the surgical site continues to bleed, then please contact your doctor.      For [facility] users:   You will receive your biopsy results in [facility] as soon as they are available. Please be assured that your physician/provider will review your results and will then determine what further treatment, evaluation, or planning is required. You should be contacted by your physician's/provider's office within 5 business days of receiving your results; If not, please reach out to directly. This is one more way [facility] is putting you first.     [facility] [place] [address]/ [phone] FAX/ [email or web address]

## 2023-01-27 NOTE — PROGRESS NOTES
Subjective:       Patient ID:  Tip Hurst is a 82 y.o. male who presents for   Chief Complaint   Patient presents with    Lesion     Abdomen     Lesion - Initial  Affected locations: abdomen  Signs / symptoms: pain  Severity: mild  Timing: constant  Aggravated by: friction  Relieving factors/Treatments tried: nothing  Improvement on treatment: no relief    Review of Systems   Constitutional: Negative.    HENT: Negative.     Respiratory: Negative.        Objective:    Physical Exam   Constitutional: He appears well-developed and well-nourished.   Eyes: No conjunctival no injection.   Neurological: He is alert and oriented to person, place, and time.   Psychiatric: He has a normal mood and affect.        Diagram Legend     Erythematous scaling macule/papule c/w actinic keratosis       Vascular papule c/w angioma      Pigmented verrucoid papule/plaque c/w seborrheic keratosis      Yellow umbilicated papule c/w sebaceous hyperplasia      Irregularly shaped tan macule c/w lentigo     1-2 mm smooth white papules consistent with Milia      Movable subcutaneous cyst with punctum c/w epidermal inclusion cyst      Subcutaneous movable cyst c/w pilar cyst      Firm pink to brown papule c/w dermatofibroma      Pedunculated fleshy papule(s) c/w skin tag(s)      Evenly pigmented macule c/w junctional nevus     Mildly variegated pigmented, slightly irregular-bordered macule c/w mildly atypical nevus      Flesh colored to evenly pigmented papule c/w intradermal nevus       Pink pearly papule/plaque c/w basal cell carcinoma      Erythematous hyperkeratotic cursted plaque c/w SCC      Surgical scar with no sign of skin cancer recurrence      Open and closed comedones      Inflammatory papules and pustules      Verrucoid papule consistent consistent with wart     Erythematous eczematous patches and plaques     Dystrophic onycholytic nail with subungual debris c/w onychomycosis     Umbilicated papule    Erythematous-base  heme-crusted tan verrucoid plaque consistent with inflamed seborrheic keratosis     Erythematous Silvery Scaling Plaque c/w Psoriasis     See annotation        Assessment / Plan:      Pathology Orders:       Normal Orders This Visit    Specimen to Pathology, Dermatology     Questions:    Procedure Type: Dermatology and skin neoplasms    Number of Specimens: 1    ------------------------: -------------------------    Spec 1 Procedure: Other (Specify) Comment - shave    Spec 1 Clinical Impression: sk vs scc vs pyogenic gran    Spec 1 Source: abd    Release to patient:           Skin lesion  -     Ambulatory referral/consult to Dermatology  Previous [facility] labs and or records and notes reviewed and considered for their impact on our clinical decision making today.  Independent historian was in exam room or on virtual today to provide information and assist in delivering therapy and treatment at home.  Discussed with patient the importance of not picking at the skin due to risks of infection, non healing, and scarring.  Discussed habit substitution with gadgets, widgets, rosary beads, jewelry, keys, et cetera.  Warned of poss sk and can regrow.      Neoplasm  Shave removal procedure note:    Lesion size 1.5 cm    Shave performed after verbal consent including risk of infection, scar, recurrence, need for additional treatment of site. Area prepped with alcohol, anesthetized with approximately 1.0cc of 1% lidocaine with epinephrine. Lesional tissue shaved with razor blade. Hemostasis achieved with application of aluminum chloride followed by hyfrecation as needed. No complications. Dressing applied. Wound care explained.  Plan excision with stitches and scar if biopsy is positive for skin ca.  Or if patient wants removal.                 Follow up if symptoms worsen or fail to improve.

## 2023-01-31 ENCOUNTER — OFFICE VISIT (OUTPATIENT)
Dept: INTERNAL MEDICINE | Facility: CLINIC | Age: 83
End: 2023-01-31
Attending: INTERNAL MEDICINE
Payer: MEDICARE

## 2023-01-31 VITALS
BODY MASS INDEX: 30.56 KG/M2 | WEIGHT: 238.13 LBS | OXYGEN SATURATION: 97 % | HEIGHT: 74 IN | DIASTOLIC BLOOD PRESSURE: 72 MMHG | SYSTOLIC BLOOD PRESSURE: 140 MMHG | HEART RATE: 80 BPM

## 2023-01-31 DIAGNOSIS — I10 HTN (HYPERTENSION), BENIGN: Primary | ICD-10-CM

## 2023-01-31 DIAGNOSIS — I70.0 ATHEROSCLEROSIS OF AORTA: ICD-10-CM

## 2023-01-31 DIAGNOSIS — D47.3 ESSENTIAL (HEMORRHAGIC) THROMBOCYTHEMIA: ICD-10-CM

## 2023-01-31 DIAGNOSIS — C61 PROSTATE CANCER: ICD-10-CM

## 2023-01-31 DIAGNOSIS — N18.31 CHRONIC KIDNEY DISEASE, STAGE 3A: ICD-10-CM

## 2023-01-31 DIAGNOSIS — M10.9 GOUT, ARTHRITIS: ICD-10-CM

## 2023-01-31 DIAGNOSIS — E03.9 HYPOTHYROIDISM, UNSPECIFIED TYPE: ICD-10-CM

## 2023-01-31 DIAGNOSIS — N40.0 BENIGN PROSTATIC HYPERPLASIA, UNSPECIFIED WHETHER LOWER URINARY TRACT SYMPTOMS PRESENT: ICD-10-CM

## 2023-01-31 DIAGNOSIS — E11.59 TYPE 2 DIABETES MELLITUS WITH OTHER CIRCULATORY COMPLICATIONS: ICD-10-CM

## 2023-01-31 DIAGNOSIS — N25.81 SECONDARY HYPERPARATHYROIDISM OF RENAL ORIGIN: ICD-10-CM

## 2023-01-31 DIAGNOSIS — E78.00 HYPERCHOLESTEROLEMIA: ICD-10-CM

## 2023-01-31 DIAGNOSIS — M79.18 MYALGIA, OTHER SITE: ICD-10-CM

## 2023-01-31 PROBLEM — R73.03 PRE-DIABETES: Status: RESOLVED | Noted: 2018-06-19 | Resolved: 2023-01-31

## 2023-01-31 PROBLEM — N32.9 LESION OF BLADDER: Status: RESOLVED | Noted: 2018-07-17 | Resolved: 2023-01-31

## 2023-01-31 PROCEDURE — 1101F PT FALLS ASSESS-DOCD LE1/YR: CPT | Mod: CPTII,S$GLB,, | Performed by: INTERNAL MEDICINE

## 2023-01-31 PROCEDURE — 99999 PR PBB SHADOW E&M-EST. PATIENT-LVL III: ICD-10-PCS | Mod: PBBFAC,,, | Performed by: INTERNAL MEDICINE

## 2023-01-31 PROCEDURE — 1101F PR PT FALLS ASSESS DOC 0-1 FALLS W/OUT INJ PAST YR: ICD-10-PCS | Mod: CPTII,S$GLB,, | Performed by: INTERNAL MEDICINE

## 2023-01-31 PROCEDURE — 3288F FALL RISK ASSESSMENT DOCD: CPT | Mod: CPTII,S$GLB,, | Performed by: INTERNAL MEDICINE

## 2023-01-31 PROCEDURE — 99214 OFFICE O/P EST MOD 30 MIN: CPT | Mod: S$GLB,,, | Performed by: INTERNAL MEDICINE

## 2023-01-31 PROCEDURE — 99214 PR OFFICE/OUTPT VISIT, EST, LEVL IV, 30-39 MIN: ICD-10-PCS | Mod: S$GLB,,, | Performed by: INTERNAL MEDICINE

## 2023-01-31 PROCEDURE — 3288F PR FALLS RISK ASSESSMENT DOCUMENTED: ICD-10-PCS | Mod: CPTII,S$GLB,, | Performed by: INTERNAL MEDICINE

## 2023-01-31 PROCEDURE — 1159F MED LIST DOCD IN RCRD: CPT | Mod: CPTII,S$GLB,, | Performed by: INTERNAL MEDICINE

## 2023-01-31 PROCEDURE — 3077F SYST BP >= 140 MM HG: CPT | Mod: CPTII,S$GLB,, | Performed by: INTERNAL MEDICINE

## 2023-01-31 PROCEDURE — 1159F PR MEDICATION LIST DOCUMENTED IN MEDICAL RECORD: ICD-10-PCS | Mod: CPTII,S$GLB,, | Performed by: INTERNAL MEDICINE

## 2023-01-31 PROCEDURE — 3078F PR MOST RECENT DIASTOLIC BLOOD PRESSURE < 80 MM HG: ICD-10-PCS | Mod: CPTII,S$GLB,, | Performed by: INTERNAL MEDICINE

## 2023-01-31 PROCEDURE — 99999 PR PBB SHADOW E&M-EST. PATIENT-LVL III: CPT | Mod: PBBFAC,,, | Performed by: INTERNAL MEDICINE

## 2023-01-31 PROCEDURE — 3077F PR MOST RECENT SYSTOLIC BLOOD PRESSURE >= 140 MM HG: ICD-10-PCS | Mod: CPTII,S$GLB,, | Performed by: INTERNAL MEDICINE

## 2023-01-31 PROCEDURE — 3078F DIAST BP <80 MM HG: CPT | Mod: CPTII,S$GLB,, | Performed by: INTERNAL MEDICINE

## 2023-01-31 NOTE — PROGRESS NOTES
"Subjective:       Patient ID: Tip Hurst is a 82 y.o. male.    Chief Complaint: Follow-up (Soreness in chest where lanced procedure occurred)    Here for 6 month f/u    81 yo with PMHx f prostate CA with biochemical recurrence, HTN, CKD stage 3, Paraproteinemia, gout, elevated alkaline phosphatase.      Had lesion taken from chest. Path awaiting. From pics and derm note likely SK. His pain is better than prior to Bx.     PSA due in 2 months this is scheduled we will get our labs then    Patient presents today for routine evaluation, physical, and labs. Patient has no major concerns or complaints today.     ### DM###  Diet controlled                HGBA1C                   6.5 (H)             01/06/2020 09:35 AM        HGBA1C                   6.3 (H)             06/19/2018 10:30 AM        HGBA1C                   6.1                 05/27/2011 08:24 AM        HGBA1C                   6.1                 09/21/2010 09:13 AM   BG on chemistries have frederick reassuring.     ### IgM monoclonal gammopathy  ###  -24 hr urine studies 2020 with no paraprotein and moderate proteinura   -IFX showed IgM lambda monoclonal band. UPEP is negative for paraprotein bands.   -Patient did not show for [facility]x appointment on 2/20/20 8/2022 [facility] NP Lidia Adler writes "Certainly could have low grade Lymphoproliferative disorder but given asmptomatic/stable labs would likely monitor anyway. In  Light of age, comorbidites and no overt progression can defer marrow and recommend fu in 6 months (vs annualy since no marrow)    ### Anemia ###  Heme suspects this is AOCD and less likely related to gammopathy.        ### prostate CA ###  Biochemical recurrence with very slow rise in PSA over the last 10 years.  -[facility] (10/2022) Dr West writes "Again, no need for additional treatment such as ADT at this time (or hopefully ever)-Follow-up 6 months with PSA"  -In 2018 he had incidental finding of a possible bladder lesion on ultrasound. " "Cystoscopy was normal at that time.      ### CKD ###           ESTGFRAFRICA             45.7 (A)            03/09/2021 08:59 AM        ESTGFRAFRICA             39.9 (A)            08/11/2020 07:41 AM        ESTGFRAFRICA             50 (A)              04/03/2020 08:27 AM        ESTGFRAFRICA             46 (A)              01/16/2020 10:10 AM        ESTGFRAFRICA             46 (A)              01/06/2020 09:35 AM        ESTGFRAFRICA             43 (A)              06/22/2018 02:10 PM        ESTGFRAFRICA             47 (A)              06/19/2018 10:30 AM        ESTGFRAFRICA             56.0 (A)            03/17/2015 09:22 AM        ESTGFRAFRICA             56.0 (A)            12/31/2014 12:05 PM        ESTGFRAFRICA             52.3 (A)            09/05/2013 09:22 AM         Bilateral knee pain and aching of hips once a month                  Review of Systems   Constitutional:  Negative for appetite change, chills, fever and unexpected weight change.   HENT:  Negative for hearing loss, sore throat and trouble swallowing.    Eyes:  Negative for visual disturbance.   Respiratory:  Negative for cough, chest tightness and shortness of breath.    Cardiovascular:  Negative for chest pain and leg swelling.   Gastrointestinal:  Negative for abdominal pain, blood in stool, constipation, diarrhea, nausea and vomiting.   Endocrine: Negative for polydipsia and polyuria.   Genitourinary:  Negative for decreased urine volume, difficulty urinating, dysuria, frequency and urgency.   Musculoskeletal:  Negative for gait problem.   Skin:  Negative for rash.   Neurological:  Negative for dizziness and numbness.   Psychiatric/Behavioral:  The patient is not nervous/anxious.      Objective:      Vitals:    01/31/23 1436   BP: (!) 140/72   BP Location: Left arm   Patient Position: Sitting   BP Method: Large (Manual)   Pulse: 80   SpO2: 97%   Weight: 108 kg (238 lb 1.6 oz)   Height: 6' 2" (1.88 m)      Physical Exam  Vitals and nursing note " reviewed.   Constitutional:       General: He is not in acute distress.     Appearance: Normal appearance. He is well-developed.   HENT:      Head: Normocephalic and atraumatic.      Mouth/Throat:      Pharynx: No oropharyngeal exudate.   Eyes:      General: No scleral icterus.     Conjunctiva/sclera: Conjunctivae normal.      Pupils: Pupils are equal, round, and reactive to light.   Neck:      Thyroid: No thyromegaly.   Cardiovascular:      Rate and Rhythm: Normal rate and regular rhythm.      Heart sounds: Normal heart sounds. No murmur heard.  Pulmonary:      Effort: Pulmonary effort is normal.      Breath sounds: Normal breath sounds. No wheezing or rales.   Abdominal:      General: There is no distension.   Musculoskeletal:         General: No tenderness.   Lymphadenopathy:      Cervical: No cervical adenopathy.   Skin:     General: Skin is warm and dry.   Neurological:      Mental Status: He is alert and oriented to person, place, and time.   Psychiatric:         Behavior: Behavior normal.       Assessment:       1. Pre-diabetes    2. HTN (hypertension), benign    3. Prostate cancer    4. Lesion of bladder    5. Hypothyroidism, unspecified type    6. Gout, arthritis    7. Benign prostatic hyperplasia, unspecified whether lower urinary tract symptoms present    8. Myalgia, other site    9. Type 2 diabetes mellitus with other circulatory complications    10. Secondary hyperparathyroidism of renal origin    11. Essential (hemorrhagic) thrombocythemia    12. Atherosclerosis of aorta    13. Chronic kidney disease, stage 3a    14. Hypercholesterolemia          Plan:       Tip was seen today for follow-up.    Diagnoses and all orders for this visit:    HTN (hypertension), benign   Best ways to lower your blood pressure: Consume less than 2 grams of sodium in a day. Count it. Regular exercise: 30-45 minutes a day, 4-5 times a week, moderate paced walking. A diet that is predominately fruits and vegetables, low fat  dairy, minimal saturated fat, whole grains (emphasis on whole), and weight loss. The more weight you lose the more benefit you get from each pound lost going forward.  -     Comprehensive Metabolic Panel; Future    Prostate cancer   F/u PS[facility] in 2 months for Dr yao    Lesion of bladder    Hypothyroidism, unspecified type  -     TSH; Future    Gout, arthritis  -     URIC ACID; Future    Benign prostatic hyperplasia, unspecified whether lower urinary tract symptoms present  -     Vitamin D; Future    Myalgia, other site  -     Vitamin D; Future    Type 2 diabetes mellitus with other circulatory complications  -     Hemoglobin A1C; Future    Secondary hyperparathyroidism of renal origin  -     Vitamin D; Future    Essential (hemorrhagic) thrombocythemia  -     TSH; Future    Atherosclerosis of aorta  -     Comprehensive Metabolic Panel; Future  -     TSH; Future    Chronic kidney disease, stage 3a  -     Comprehensive Metabolic Panel; Future    Hypercholesterolemia           Moe Contreras MD  Internal Medicine-[facility]        Side effects of medication(s) were discussed in detail and patient voiced understanding.  Patient will call back for any issues or complications.

## 2023-02-07 LAB
FINAL PATHOLOGIC DIAGNOSIS: NORMAL
GROSS: NORMAL
Lab: NORMAL
MICROSCOPIC EXAM: NORMAL

## 2023-02-20 ENCOUNTER — TELEPHONE (OUTPATIENT)
Dept: DERMATOLOGY | Facility: CLINIC | Age: 83
End: 2023-02-20
Payer: MEDICARE

## 2023-02-27 ENCOUNTER — PATIENT OUTREACH (OUTPATIENT)
Dept: ADMINISTRATIVE | Facility: HOSPITAL | Age: 83
End: 2023-02-27
Payer: MEDICARE

## 2023-03-11 ENCOUNTER — TELEPHONE (OUTPATIENT)
Dept: INTERNAL MEDICINE | Facility: CLINIC | Age: 83
End: 2023-03-11
Payer: MEDICARE

## 2023-03-11 NOTE — TELEPHONE ENCOUNTER
Please contact patient and see if they have home BP, most recent and an average. If they do not have home BP then please schedule f/u within 7-10 days for nurse visit for BP check. Please make sure patient has all meds and is taking all medications prior to nurse visit.

## 2023-03-13 NOTE — TELEPHONE ENCOUNTER
LVM   Please contact patient and see if they have home BP, most recent and an average. If they do not have home BP then please schedule f/u within 7-10 days for nurse visit for BP check. Please make sure patient has all meds and is taking all medications prior to nurse visit.     REROUTING FOR 2ND ATTEMPT

## 2023-03-14 NOTE — TELEPHONE ENCOUNTER
LVM   Please contact patient and see if they have home BP, most recent and an average. If they do not have home BP then please schedule f/u within 7-10 days for nurse visit for BP check. Please make sure patient has all meds and is taking all medications prior to nurse visit.     REROUTING FOR 3RD ATTEMPT

## 2023-03-15 NOTE — TELEPHONE ENCOUNTER
LVM   Please contact patient and see if they have home BP, most recent and an average. If they do not have home BP then please schedule f/u within 7-10 days for nurse visit for BP check. Please make sure patient has all meds and is taking all medications prior to nurse visit.     3RD ATTEMPT

## 2023-03-20 ENCOUNTER — PATIENT OUTREACH (OUTPATIENT)
Dept: ADMINISTRATIVE | Facility: HOSPITAL | Age: 83
End: 2023-03-20
Payer: MEDICARE

## 2023-04-05 ENCOUNTER — LAB VISIT (OUTPATIENT)
Dept: LAB | Facility: HOSPITAL | Age: 83
End: 2023-04-05
Payer: MEDICARE

## 2023-04-05 DIAGNOSIS — D47.2 MGUS (MONOCLONAL GAMMOPATHY OF UNKNOWN SIGNIFICANCE): ICD-10-CM

## 2023-04-05 LAB
ALBUMIN SERPL BCP-MCNC: 3.7 G/DL (ref 3.5–5.2)
ALP SERPL-CCNC: 161 U/L (ref 55–135)
ALT SERPL W/O P-5'-P-CCNC: 19 U/L (ref 10–44)
ANION GAP SERPL CALC-SCNC: 11 MMOL/L (ref 8–16)
AST SERPL-CCNC: 19 U/L (ref 10–40)
BASOPHILS # BLD AUTO: 0.11 K/UL (ref 0–0.2)
BASOPHILS NFR BLD: 1.1 % (ref 0–1.9)
BILIRUB SERPL-MCNC: 0.6 MG/DL (ref 0.1–1)
BUN SERPL-MCNC: 18 MG/DL (ref 8–23)
CALCIUM SERPL-MCNC: 10.2 MG/DL (ref 8.7–10.5)
CHLORIDE SERPL-SCNC: 105 MMOL/L (ref 95–110)
CO2 SERPL-SCNC: 22 MMOL/L (ref 23–29)
CREAT SERPL-MCNC: 1.5 MG/DL (ref 0.5–1.4)
DIFFERENTIAL METHOD: ABNORMAL
EOSINOPHIL # BLD AUTO: 0.4 K/UL (ref 0–0.5)
EOSINOPHIL NFR BLD: 4 % (ref 0–8)
ERYTHROCYTE [DISTWIDTH] IN BLOOD BY AUTOMATED COUNT: 13.2 % (ref 11.5–14.5)
EST. GFR  (NO RACE VARIABLE): 45.6 ML/MIN/1.73 M^2
GLUCOSE SERPL-MCNC: 115 MG/DL (ref 70–110)
HCT VFR BLD AUTO: 37.3 % (ref 40–54)
HGB BLD-MCNC: 11.8 G/DL (ref 14–18)
IGA SERPL-MCNC: 304 MG/DL (ref 40–350)
IGG SERPL-MCNC: 1248 MG/DL (ref 650–1600)
IGM SERPL-MCNC: 1446 MG/DL (ref 50–300)
IMM GRANULOCYTES # BLD AUTO: 0.03 K/UL (ref 0–0.04)
IMM GRANULOCYTES NFR BLD AUTO: 0.3 % (ref 0–0.5)
LYMPHOCYTES # BLD AUTO: 3.8 K/UL (ref 1–4.8)
LYMPHOCYTES NFR BLD: 39.5 % (ref 18–48)
MCH RBC QN AUTO: 27.1 PG (ref 27–31)
MCHC RBC AUTO-ENTMCNC: 31.6 G/DL (ref 32–36)
MCV RBC AUTO: 86 FL (ref 82–98)
MONOCYTES # BLD AUTO: 0.8 K/UL (ref 0.3–1)
MONOCYTES NFR BLD: 8.5 % (ref 4–15)
NEUTROPHILS # BLD AUTO: 4.5 K/UL (ref 1.8–7.7)
NEUTROPHILS NFR BLD: 46.6 % (ref 38–73)
NRBC BLD-RTO: 0 /100 WBC
PLATELET # BLD AUTO: 385 K/UL (ref 150–450)
PMV BLD AUTO: 8.7 FL (ref 9.2–12.9)
POTASSIUM SERPL-SCNC: 4.6 MMOL/L (ref 3.5–5.1)
PROT SERPL-MCNC: 9 G/DL (ref 6–8.4)
RBC # BLD AUTO: 4.36 M/UL (ref 4.6–6.2)
SODIUM SERPL-SCNC: 138 MMOL/L (ref 136–145)
WBC # BLD AUTO: 9.72 K/UL (ref 3.9–12.7)

## 2023-04-05 PROCEDURE — 84165 PROTEIN E-PHORESIS SERUM: CPT | Performed by: NURSE PRACTITIONER

## 2023-04-05 PROCEDURE — 36415 COLL VENOUS BLD VENIPUNCTURE: CPT | Performed by: NURSE PRACTITIONER

## 2023-04-05 PROCEDURE — 80053 COMPREHEN METABOLIC PANEL: CPT | Performed by: NURSE PRACTITIONER

## 2023-04-05 PROCEDURE — 84165 PATHOLOGIST INTERPRETATION SPE: ICD-10-PCS | Mod: 26,,, | Performed by: PATHOLOGY

## 2023-04-05 PROCEDURE — 84165 PROTEIN E-PHORESIS SERUM: CPT | Mod: 26,,, | Performed by: PATHOLOGY

## 2023-04-05 PROCEDURE — 83521 IG LIGHT CHAINS FREE EACH: CPT | Mod: 59 | Performed by: NURSE PRACTITIONER

## 2023-04-05 PROCEDURE — 85025 COMPLETE CBC W/AUTO DIFF WBC: CPT | Performed by: NURSE PRACTITIONER

## 2023-04-05 PROCEDURE — 82784 ASSAY IGA/IGD/IGG/IGM EACH: CPT | Performed by: NURSE PRACTITIONER

## 2023-04-06 ENCOUNTER — OFFICE VISIT (OUTPATIENT)
Dept: UROLOGY | Facility: CLINIC | Age: 83
End: 2023-04-06
Attending: UROLOGY
Payer: MEDICARE

## 2023-04-06 VITALS
SYSTOLIC BLOOD PRESSURE: 148 MMHG | DIASTOLIC BLOOD PRESSURE: 76 MMHG | HEART RATE: 82 BPM | HEIGHT: 74 IN | OXYGEN SATURATION: 97 % | RESPIRATION RATE: 16 BRPM | WEIGHT: 238 LBS | BODY MASS INDEX: 30.54 KG/M2

## 2023-04-06 DIAGNOSIS — C61 PROSTATE CANCER: Primary | ICD-10-CM

## 2023-04-06 LAB
ALBUMIN SERPL ELPH-MCNC: 3.96 G/DL (ref 3.35–5.55)
ALPHA1 GLOB SERPL ELPH-MCNC: 0.36 G/DL (ref 0.17–0.41)
ALPHA2 GLOB SERPL ELPH-MCNC: 1.2 G/DL (ref 0.43–0.99)
B-GLOBULIN SERPL ELPH-MCNC: 1.01 G/DL (ref 0.5–1.1)
GAMMA GLOB SERPL ELPH-MCNC: 2.06 G/DL (ref 0.67–1.58)
KAPPA LC SER QL IA: 5.48 MG/DL (ref 0.33–1.94)
KAPPA LC/LAMBDA SER IA: 0.32 (ref 0.26–1.65)
LAMBDA LC SER QL IA: 17.32 MG/DL (ref 0.57–2.63)
PATHOLOGIST INTERPRETATION SPE: NORMAL
PROT SERPL-MCNC: 8.6 G/DL (ref 6–8.4)

## 2023-04-06 PROCEDURE — 3288F FALL RISK ASSESSMENT DOCD: CPT | Mod: CPTII,S$GLB,, | Performed by: UROLOGY

## 2023-04-06 PROCEDURE — 1126F PR PAIN SEVERITY QUANTIFIED, NO PAIN PRESENT: ICD-10-PCS | Mod: CPTII,S$GLB,, | Performed by: UROLOGY

## 2023-04-06 PROCEDURE — 1126F AMNT PAIN NOTED NONE PRSNT: CPT | Mod: CPTII,S$GLB,, | Performed by: UROLOGY

## 2023-04-06 PROCEDURE — 3078F PR MOST RECENT DIASTOLIC BLOOD PRESSURE < 80 MM HG: ICD-10-PCS | Mod: CPTII,S$GLB,, | Performed by: UROLOGY

## 2023-04-06 PROCEDURE — 1159F MED LIST DOCD IN RCRD: CPT | Mod: CPTII,S$GLB,, | Performed by: UROLOGY

## 2023-04-06 PROCEDURE — 1160F PR REVIEW ALL MEDS BY PRESCRIBER/CLIN PHARMACIST DOCUMENTED: ICD-10-PCS | Mod: CPTII,S$GLB,, | Performed by: UROLOGY

## 2023-04-06 PROCEDURE — 99214 PR OFFICE/OUTPT VISIT, EST, LEVL IV, 30-39 MIN: ICD-10-PCS | Mod: S$GLB,,, | Performed by: UROLOGY

## 2023-04-06 PROCEDURE — 3077F SYST BP >= 140 MM HG: CPT | Mod: CPTII,S$GLB,, | Performed by: UROLOGY

## 2023-04-06 PROCEDURE — 3288F PR FALLS RISK ASSESSMENT DOCUMENTED: ICD-10-PCS | Mod: CPTII,S$GLB,, | Performed by: UROLOGY

## 2023-04-06 PROCEDURE — 3077F PR MOST RECENT SYSTOLIC BLOOD PRESSURE >= 140 MM HG: ICD-10-PCS | Mod: CPTII,S$GLB,, | Performed by: UROLOGY

## 2023-04-06 PROCEDURE — 1101F PR PT FALLS ASSESS DOC 0-1 FALLS W/OUT INJ PAST YR: ICD-10-PCS | Mod: CPTII,S$GLB,, | Performed by: UROLOGY

## 2023-04-06 PROCEDURE — 1101F PT FALLS ASSESS-DOCD LE1/YR: CPT | Mod: CPTII,S$GLB,, | Performed by: UROLOGY

## 2023-04-06 PROCEDURE — 1160F RVW MEDS BY RX/DR IN RCRD: CPT | Mod: CPTII,S$GLB,, | Performed by: UROLOGY

## 2023-04-06 PROCEDURE — 3078F DIAST BP <80 MM HG: CPT | Mod: CPTII,S$GLB,, | Performed by: UROLOGY

## 2023-04-06 PROCEDURE — 99214 OFFICE O/P EST MOD 30 MIN: CPT | Mod: S$GLB,,, | Performed by: UROLOGY

## 2023-04-06 PROCEDURE — 1159F PR MEDICATION LIST DOCUMENTED IN MEDICAL RECORD: ICD-10-PCS | Mod: CPTII,S$GLB,, | Performed by: UROLOGY

## 2023-04-06 NOTE — PROGRESS NOTES
"Subjective:      Tip Hurst is a 82 y.o. male who returns today for a f/u    He has history of prostate cancer s/p prostatectomy in 2006. No known recurrence. No adjuvant treatments reported. Path unknown.  He has had biochemical recurrence per PSA testing beginning in 2010 with a very slow rise in PSA in the interim.      In 2018 he had incidental finding of a possible bladder lesion on ultrasound.  Cystoscopy was normal at that time.    Today he has no c/o. Here to review recent PSA.    The following portions of the patient's history were reviewed and updated as appropriate: allergies, current medications, past family history, past medical history, past social history, past surgical history and problem list.    Review of Systems  A comprehensive multipoint review of systems was negative except as otherwise stated in the HPI.     Objective:   Vitals: BP (!) 148/76 (BP Location: Right arm, Patient Position: Sitting, BP Method: Large (Automatic))   Pulse 82   Resp 16   Ht 6' 2" (1.88 m)   Wt 108 kg (238 lb)   SpO2 97%   BMI 30.56 kg/m²     Physical Exam   General: alert and oriented, no acute distress  Respiratory: Symmetric expansion, non-labored breathing  Neuro: no gross deficits  Psych: normal judgment and insight, normal mood/affect and non-anxious    Lab Review     Lab Results   Component Value Date    WBC 9.72 04/05/2023    HGB 11.8 (L) 04/05/2023    HCT 37.3 (L) 04/05/2023    MCV 86 04/05/2023     04/05/2023     Lab Results   Component Value Date    CREATININE 1.5 (H) 04/05/2023    BUN 18 04/05/2023     Component PSA, SCREEN PSA DIAGNOSTIC   Latest Ref Rng & Units 0.00 - 4.00 ng/mL 0.00 - 4.00 ng/mL   9/26/2022  1.1   3/18/2020  0.39   1/16/2020  0.41   9/13/2019  0.35   3/14/2019  0.31   8/21/2018  0.25   9/5/2013 0.26    7/23/2013 0.25    8/21/2012 0.18    5/4/2011 0.03    9/21/2010 0.02    9/25/2009 <0.01    8/6/2008 <0.01    8/9/2007 <0.1    7/13/2006 6.6 (H)    4/19/2005 4.2 (H)  "       Assessment and Plan:   1. Prostate cancer  -- Biochemical recurrence with very slow rise in PSA over the last 12 years.  -- Previously decision made to not pursue additional treatment such as ADT at this time (or hopefully ever)  -- PSA today  -- PSA in 6 mos

## 2023-04-10 DIAGNOSIS — C61 PROSTATE CANCER: Primary | ICD-10-CM

## 2023-04-14 ENCOUNTER — TELEPHONE (OUTPATIENT)
Dept: DERMATOLOGY | Facility: CLINIC | Age: 83
End: 2023-04-14
Payer: MEDICARE

## 2023-04-14 NOTE — TELEPHONE ENCOUNTER
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

## 2023-05-03 ENCOUNTER — TELEPHONE (OUTPATIENT)
Dept: INTERNAL MEDICINE | Facility: CLINIC | Age: 83
End: 2023-05-03
Payer: MEDICARE

## 2023-05-03 NOTE — TELEPHONE ENCOUNTER
----- Message from Ant Dugan sent at 5/3/2023  1:02 PM CDT -----  Name of Who is Calling: ESTEFANIA ROBISON [[id number]]    Need asap        What is the request in detail: Patient is requesting call back to get refill for medicine of unknown name used for high blood pressure              Can the clinic reply by [facility]: no              What Number to Call Back if not in [facility]: [phone]

## 2023-05-03 NOTE — TELEPHONE ENCOUNTER
Lvm  3rd attempt  Returned call to Mr. Hurst. Left a voice message. Need to know the name of BP medication and advised to check his bottles. Need to know home BP as well. Advised to return call to

## 2023-05-03 NOTE — TELEPHONE ENCOUNTER
Returned call to Mr. Hurst. Left a voice message. Need to know the name of BP medication and advised to check his bottles. Need to know home BP as well. Advised to return call to

## 2023-05-03 NOTE — TELEPHONE ENCOUNTER
Lvm 2nd attempt  Returned call to Mr. Hurst. Left a voice message. Need to know the name of BP medication and advised to check his bottles. Need to know home BP as well. Advised to return call to   Rerouting for 3rd attempt

## 2023-05-03 NOTE — TELEPHONE ENCOUNTER
----- Message from [facility] sent at 5/3/2023  3:34 PM CDT -----  Type:  Needs Medical Advice    Who Called: Pt   Would the patient rather a call back or a response via [facility]? Callback   Best Call Back Number: [phone]  Additional Information: pt missed call from office, requesting callback from office. Pt states the name of the of the medication is nifedipine 90 mg

## 2023-05-04 DIAGNOSIS — I10 HTN (HYPERTENSION), BENIGN: Primary | ICD-10-CM

## 2023-05-04 RX ORDER — NIFEDIPINE 90 MG/1
90 TABLET, EXTENDED RELEASE ORAL DAILY
Qty: 90 TABLET | Refills: 2 | Status: SHIPPED | OUTPATIENT
Start: 2023-05-04 | End: 2023-07-24 | Stop reason: SDUPTHER

## 2023-05-04 NOTE — TELEPHONE ENCOUNTER
----- Message from Sophia Frankel sent at 5/4/2023  2:40 PM CDT -----  Who Called: Patient    What is the request in detail: Requesting call back to discuss requesting his BP medication refill. Please note that patient still does not know which of the medication he needs and only describes it as his BP medication. Patient is asking if his Dr can review patient's medication list and refill those of which patient needs to continue. Please advise.     Can the clinic reply by [facility]? No    Best Call Back Number: [phone]      Additional Information:     Symptom Screen outcome:

## 2023-06-26 DIAGNOSIS — I10 HTN (HYPERTENSION), BENIGN: ICD-10-CM

## 2023-06-26 RX ORDER — LOSARTAN POTASSIUM 100 MG/1
TABLET ORAL
Qty: 90 TABLET | Refills: 3 | Status: SHIPPED | OUTPATIENT
Start: 2023-06-26 | End: 2023-07-26 | Stop reason: SDUPTHER

## 2023-06-26 NOTE — TELEPHONE ENCOUNTER
Refill Routing Note   Medication(s) are not appropriate for processing by [facility] for the following reason(s):      Required vitals abnormal:  BP (!) 148/76    [facility] action(s):  Defer Care Due:  Labs due   Medication Therapy Plan: Labs (lipid panel, uric acid) outdated      Appointments  past 12m or future 3m with PCP    Date Provider   Last Visit   1/31/2023 Moe Gallardo MD   Next Visit   Visit date not found Moe Gallardo MD   ED visits in past 90 days: 0        Note composed:11:36 AM 06/26/2023

## 2023-06-26 NOTE — TELEPHONE ENCOUNTER
Care Due:                  Date            Visit Type   Department     Provider  --------------------------------------------------------------------------------                                EP -                              PRIMARY      [facility]  Last Visit: 01-      CARE ([facility])   MEDICINE       Moe Gallardo  Next Visit: None Scheduled  None         None Found                                                            Last  Test          Frequency    Reason                     Performed    Due Date  --------------------------------------------------------------------------------    Lipid Panel.  12 months..  simvastatin..............  Not Found    Overdue    Uric Acid...  12 months..  allopurinoL..............  Not Found    Overdue    [employer] Embedded Care Due Messages. Reference number: [id number].   6/26/2023 12:10:32 AM CDT

## 2023-07-24 DIAGNOSIS — I10 HTN (HYPERTENSION), BENIGN: ICD-10-CM

## 2023-07-24 DIAGNOSIS — M10.071 IDIOPATHIC GOUT OF RIGHT FOOT, UNSPECIFIED CHRONICITY: ICD-10-CM

## 2023-07-24 RX ORDER — LOSARTAN POTASSIUM 100 MG/1
100 TABLET ORAL DAILY
Qty: 90 TABLET | Refills: 3 | Status: CANCELLED | OUTPATIENT
Start: 2023-07-24

## 2023-07-24 RX ORDER — ALLOPURINOL 100 MG/1
100 TABLET ORAL DAILY
Qty: 90 TABLET | Refills: 2 | Status: SHIPPED | OUTPATIENT
Start: 2023-07-24

## 2023-07-24 RX ORDER — NIFEDIPINE 90 MG/1
90 TABLET, EXTENDED RELEASE ORAL DAILY
Qty: 90 TABLET | Refills: 2 | Status: SHIPPED | OUTPATIENT
Start: 2023-07-24

## 2023-07-24 NOTE — TELEPHONE ENCOUNTER
----- Message from Gabriela Dunham sent at 7/21/2023  1:33 PM CDT -----  Regarding: self [phone]  Type: RX Refill Request       Who Called: self        Refill or New Rx: refill       RX Name and Strength: NIFEdipine (PROCARDIA-XL) 90 MG & allopurinoL (ZYLOPRIM) 100 MG        Preferred Pharmacy with phone number:   [facility]/pharmacy #[id number] - [facility] [address] [address] [address] [email or web address] [place] [address] [email or web address] [address]  Phone: [phone] Fax: [phone]    Local or Mail Order: local        Would the patient rather a call back or a response via [facility]? Call back [phone]

## 2023-07-24 NOTE — TELEPHONE ENCOUNTER
Refill Routing Note   Medication(s) are not appropriate for processing by [facility] for the following reason(s):      Required labs outdated: Allopurinol  Required vitals abnormal: Nifedipine    [facility] action(s):  Defer Care Due:  None identified              Appointments  past 12m or future 3m with PCP    Date Provider   Last Visit   1/31/2023 Moe Glalardo MD   Next Visit   Visit date not found Moe Gallardo MD   ED visits in past 90 days: 0        Note composed:12:53 PM 07/24/2023

## 2023-07-24 NOTE — TELEPHONE ENCOUNTER
No care due was identified.  [facility] Embedded Care Due Messages. Reference number: [id number].   7/24/2023 10:19:27 AM CDT

## 2023-07-24 NOTE — TELEPHONE ENCOUNTER
Spoke with pt stating messages below:    Refill Routing Note   Medication(s) are not appropriate for processing by [facility] for the following reason(s):      Required labs outdated: Allopurinol  Required vitals abnormal: Nifedipine   MD HORACIO Mcclure Staff  Caller: Unspecified (Today, 10:16 AM)  Please schedule f/u appt       Pt states he'll call us back tomorrow to schedule the following below as his children have to bring him to get this lab done:    Overdue          JAN 6 2021 Hemoglobin A1c (Prediabetes) (Yearly)   Scheduled for: Jul 26, 2023       Pt also has to give me a normal bp

## 2023-07-25 DIAGNOSIS — R06.02 SOB (SHORTNESS OF BREATH): ICD-10-CM

## 2023-07-25 RX ORDER — LEVOCETIRIZINE DIHYDROCHLORIDE 5 MG/1
5 TABLET, FILM COATED ORAL NIGHTLY
Qty: 90 TABLET | Refills: 1 | Status: SHIPPED | OUTPATIENT
Start: 2023-07-25 | End: 2024-01-17

## 2023-07-25 NOTE — TELEPHONE ENCOUNTER
Refill Routing Note   Medication(s) are not appropriate for processing by [facility] for the following reason(s):      Drug-disease interaction    [facility] action(s):  Defer Care Due:  None identified     Medication Therapy Plan: ALLOPURINOL SENT AND CONFIRMED RECEIVED ON 07/24/2023;levocetirizine and Chronic kidney disease, stage 3a    Pharmacist review requested: Yes     Appointments  past 12m or future 3m with PCP    Date Provider   Last Visit   1/31/2023 Moe Gallardo MD   Next Visit   Visit date not found Moe Gallardo MD   ED visits in past 90 days: 0        Note composed:10:08 AM 07/25/2023

## 2023-07-25 NOTE — TELEPHONE ENCOUNTER
Refill Decision Note   Tip Hurst  is requesting a refill authorization.  Brief Assessment and Rationale for Refill:  Approve     Medication Therapy Plan:  ALLOPURINOL SENT AND CONFIRMED RECEIVED ON 07/24/2023;      Pharmacist review requested: Yes   Extended chart review required: Yes   Comments:     Note composed:10:11 AM 07/25/2023             Appointments     Last Visit   1/31/2023 Moe Gallardo MD   Next Visit   Visit date not found Moe Gallardo MD

## 2023-07-25 NOTE — TELEPHONE ENCOUNTER
No care due was identified.  [facility] Embedded Care Due Messages. Reference number: [id number].   7/25/2023 9:09:49 AM CDT

## 2023-07-25 NOTE — TELEPHONE ENCOUNTER
Spoke with pt stating messages below:    Refill Routing Note   Medication(s) are not appropriate for processing by [facility] for the following reason(s):      Required labs outdated: Allopurinol  Required vitals abnormal: Nifedipine   MD HORACIO Mcclure Staff  Caller: Unspecified (Today, 10:16 AM)  Please schedule f/u appt       Pt states he'll call us back tomorrow to schedule the following below as his children have to bring him to get this lab done:    Overdue          JAN 6 2021 Hemoglobin A1c (Prediabetes) (Yearly)   Scheduled for: Jul 26, 2023       Pt will give me a call tomorrow with a normal bp

## 2023-07-25 NOTE — TELEPHONE ENCOUNTER
----- Message from Nicolás Osborne sent at 7/24/2023  4:05 PM CDT -----  Type: RX Refill Request    Who Called: Self    Have you contacted your pharmacy:No    Refill or New Rx:Refill    RX Name and Strength:allopurinoL (ZYLOPRIM) 100 MG tablet, levocetirizine (XYZAL) 5 MG tablet    Preferred Pharmacy with phone number:[facility]/pharmacy #[id number] [address]   Phone:  [phone]          Local or Mail Order:Local    Would the patient rather a call back or a response via [facility]? Call    Best Call Back Number:.[phone] (home)       Additional Information:

## 2023-07-26 ENCOUNTER — LAB VISIT (OUTPATIENT)
Dept: LAB | Facility: OTHER | Age: 83
End: 2023-07-26
Attending: INTERNAL MEDICINE
Payer: MEDICARE

## 2023-07-26 ENCOUNTER — TELEPHONE (OUTPATIENT)
Dept: INTERNAL MEDICINE | Facility: CLINIC | Age: 83
End: 2023-07-26
Payer: MEDICARE

## 2023-07-26 VITALS — SYSTOLIC BLOOD PRESSURE: 135 MMHG | DIASTOLIC BLOOD PRESSURE: 61 MMHG

## 2023-07-26 DIAGNOSIS — M10.9 GOUT, ARTHRITIS: ICD-10-CM

## 2023-07-26 DIAGNOSIS — D47.3 ESSENTIAL (HEMORRHAGIC) THROMBOCYTHEMIA: ICD-10-CM

## 2023-07-26 DIAGNOSIS — N18.31 CHRONIC KIDNEY DISEASE, STAGE 3A: ICD-10-CM

## 2023-07-26 DIAGNOSIS — E11.59 TYPE 2 DIABETES MELLITUS WITH OTHER CIRCULATORY COMPLICATIONS: ICD-10-CM

## 2023-07-26 DIAGNOSIS — E03.9 HYPOTHYROIDISM, UNSPECIFIED TYPE: ICD-10-CM

## 2023-07-26 DIAGNOSIS — I10 HTN (HYPERTENSION), BENIGN: ICD-10-CM

## 2023-07-26 DIAGNOSIS — N25.81 SECONDARY HYPERPARATHYROIDISM OF RENAL ORIGIN: ICD-10-CM

## 2023-07-26 DIAGNOSIS — I70.0 ATHEROSCLEROSIS OF AORTA: ICD-10-CM

## 2023-07-26 DIAGNOSIS — N40.0 BENIGN PROSTATIC HYPERPLASIA, UNSPECIFIED WHETHER LOWER URINARY TRACT SYMPTOMS PRESENT: ICD-10-CM

## 2023-07-26 DIAGNOSIS — M79.18 MYALGIA, OTHER SITE: ICD-10-CM

## 2023-07-26 LAB
25(OH)D3+25(OH)D2 SERPL-MCNC: 72 NG/ML (ref 30–96)
ALBUMIN SERPL BCP-MCNC: 3.7 G/DL (ref 3.5–5.2)
ALP SERPL-CCNC: 150 U/L (ref 55–135)
ALT SERPL W/O P-5'-P-CCNC: 15 U/L (ref 10–44)
ANION GAP SERPL CALC-SCNC: 9 MMOL/L (ref 8–16)
AST SERPL-CCNC: 15 U/L (ref 10–40)
BILIRUB SERPL-MCNC: 0.4 MG/DL (ref 0.1–1)
BUN SERPL-MCNC: 22 MG/DL (ref 8–23)
CALCIUM SERPL-MCNC: 10 MG/DL (ref 8.7–10.5)
CHLORIDE SERPL-SCNC: 110 MMOL/L (ref 95–110)
CO2 SERPL-SCNC: 20 MMOL/L (ref 23–29)
CREAT SERPL-MCNC: 2.1 MG/DL (ref 0.5–1.4)
EST. GFR  (NO RACE VARIABLE): 30 ML/MIN/1.73 M^2
ESTIMATED AVG GLUCOSE: 111 MG/DL (ref 68–131)
GLUCOSE SERPL-MCNC: 125 MG/DL (ref 70–110)
HBA1C MFR BLD: 5.5 % (ref 4–5.6)
POTASSIUM SERPL-SCNC: 5.1 MMOL/L (ref 3.5–5.1)
PROT SERPL-MCNC: 8.3 G/DL (ref 6–8.4)
SODIUM SERPL-SCNC: 139 MMOL/L (ref 136–145)
T4 FREE SERPL-MCNC: 0.89 NG/DL (ref 0.71–1.51)
TSH SERPL DL<=0.005 MIU/L-ACNC: 4 UIU/ML (ref 0.4–4)
URATE SERPL-MCNC: 8.3 MG/DL (ref 3.4–7)

## 2023-07-26 PROCEDURE — 84439 ASSAY OF FREE THYROXINE: CPT | Performed by: INTERNAL MEDICINE

## 2023-07-26 PROCEDURE — 36415 COLL VENOUS BLD VENIPUNCTURE: CPT | Performed by: INTERNAL MEDICINE

## 2023-07-26 PROCEDURE — 80053 COMPREHEN METABOLIC PANEL: CPT | Performed by: INTERNAL MEDICINE

## 2023-07-26 PROCEDURE — 82306 VITAMIN D 25 HYDROXY: CPT | Performed by: INTERNAL MEDICINE

## 2023-07-26 PROCEDURE — 83036 HEMOGLOBIN GLYCOSYLATED A1C: CPT | Performed by: INTERNAL MEDICINE

## 2023-07-26 PROCEDURE — 84443 ASSAY THYROID STIM HORMONE: CPT | Performed by: INTERNAL MEDICINE

## 2023-07-26 PROCEDURE — 84550 ASSAY OF BLOOD/URIC ACID: CPT | Performed by: INTERNAL MEDICINE

## 2023-07-26 RX ORDER — NIFEDIPINE 90 MG/1
90 TABLET, EXTENDED RELEASE ORAL DAILY
Qty: 90 TABLET | Refills: 2 | OUTPATIENT
Start: 2023-07-26

## 2023-07-26 RX ORDER — LOSARTAN POTASSIUM 100 MG/1
100 TABLET ORAL DAILY
Qty: 90 TABLET | Refills: 1 | Status: SHIPPED | OUTPATIENT
Start: 2023-07-26 | End: 2024-03-27

## 2023-07-26 RX ORDER — ALLOPURINOL 100 MG/1
100 TABLET ORAL DAILY
Qty: 90 TABLET | Refills: 2 | OUTPATIENT
Start: 2023-07-26

## 2023-07-26 NOTE — TELEPHONE ENCOUNTER
Refill Routing Note   Medication(s) are not appropriate for processing by [facility] for the following reason(s):      Required labs abnormal    [facility] action(s):  Defer Care Due:  None identified            Pharmacist review requested: Yes     Appointments  past 12m or future 3m with PCP    Date Provider   Last Visit   1/31/2023 Moe Gallardo MD   Next Visit   8/25/2023 Moe Gallardo MD   ED visits in past 90 days: 0        Note composed:1:52 PM 07/26/2023

## 2023-07-26 NOTE — TELEPHONE ENCOUNTER
Refill Decision Note   Tip Hurst  is requesting a refill authorization.  Brief Assessment and Rationale for Refill:  Approve     Medication Therapy Plan:  Dose appropriate per renal fxn.      Pharmacist review requested: Yes   Comments:     Note composed:5:00 PM 07/26/2023

## 2023-07-26 NOTE — TELEPHONE ENCOUNTER
----- Message from Dayne Cruz MA sent at 7/26/2023 10:11 AM CDT -----  Type: Patient Call Back    Who called:Self    What is the request in detail:pt. States he has his blood pressures from last night and this morning .. last night it was 176/81 and this morning 153/62 & 135/61..    Can the clinic reply by [facility]?No    Would the patient rather a call back or a response via [facility]? yes    Best call back number: [phone] (home)

## 2023-07-26 NOTE — TELEPHONE ENCOUNTER
Refill Decision Note   Tip Hurst  is requesting a refill authorization.  Brief Assessment and Rationale for Refill:  Defer  Quick Discontinue     Medication Therapy Plan:  E-Prescribing Status: Receipt confirmed by pharmacy (7/24/2023  3:08 PM CDT)-Will pend losartan in a new encounter. QDC Nifedipine and allopurinol      Comments:     Note composed:1:50 PM 07/26/2023

## 2023-07-26 NOTE — TELEPHONE ENCOUNTER
No care due was identified.  [facility] Embedded Care Due Messages. Reference number: [id number].   7/26/2023 1:51:42 PM CDT

## 2023-07-27 DIAGNOSIS — N17.9 ACUTE KIDNEY INJURY SUPERIMPOSED ON CKD: Primary | ICD-10-CM

## 2023-07-27 DIAGNOSIS — N18.9 ACUTE KIDNEY INJURY SUPERIMPOSED ON CKD: Primary | ICD-10-CM

## 2023-07-27 NOTE — PROGRESS NOTES
Please notify patient of results:  Your kidney function has decreased. Very often this is due to dehydration. I would like for you to increase your daily water consumption to at least  ounces daily until we repeat labs in 2 weeks. For the time being avoid NSAIDS such as Advil, ibuprofen, motrin, goody's, aspirin (other than 81mg if taking). Please let me know when you would like to schedule this by responding to this message and it will go to my support staff. If you have additional questions or concerns they will forward those on to me. If kidney function does not rebound then we may need to schedule an appointment to talk further and get some additional test done. We will decide this after repeat blood test.

## 2023-07-28 ENCOUNTER — TELEPHONE (OUTPATIENT)
Dept: INTERNAL MEDICINE | Facility: CLINIC | Age: 83
End: 2023-07-28
Payer: MEDICARE

## 2023-07-28 NOTE — TELEPHONE ENCOUNTER
----- Message from Moe Gallardo MD sent at 7/27/2023  3:05 PM CDT -----  Please notify patient of results:  Your kidney function has decreased. Very often this is due to dehydration. I would like for you to increase your daily water consumption to at least  ounces daily until we repeat labs in 2 weeks. For the time being avoid NSAIDS such as Advil, ibuprofen, motrin, goody's, aspirin (other than 81mg if taking). Please let me know when you would like to schedule this by responding to this message and it will go to my support staff. If you have additional questions or concerns they will forward those on to me. If kidney function does not rebound then we may need to schedule an appointment to talk further and get some additional test done. We will decide this after repeat blood test.

## 2023-07-28 NOTE — TELEPHONE ENCOUNTER
[facility] for Mr. Hurst to call . Need to discuss the listed message with him, and discuss scheduling lab work as ordered.

## 2023-07-28 NOTE — TELEPHONE ENCOUNTER
Result note read to pt below:    ----- Message from Moe Gallardo MD sent at 7/27/2023  3:05 PM CDT -----  Please notify patient of results:  Your kidney function has decreased. Very often this is due to dehydration. I would like for you to increase your daily water consumption to at least  ounces daily until we repeat labs in 2 weeks. For the time being avoid NSAIDS such as Advil, ibuprofen, motrin, goody's, aspirin (other than 81mg if taking). Please let me know when you would like to schedule this by responding to this message and it will go to my support staff. If you have additional questions or concerns they will forward those on to me. If kidney function does not rebound then we may need to schedule an appointment to talk further and get some additional test done. We will decide this after repeat blood test.    Pt states he drinks 6-8 bottles of water daily.  He adds flavor packets to them and has been doing this over the years since you first informed him about his kidneys. Pt states he drinks 1 soda weekly but feels his side moving(kidney) when drinking soda but not sure if it's the flavor packet causing the movements. Lab appt scheduled in 2wks. Sent appt reminder via mail.

## 2023-08-11 ENCOUNTER — LAB VISIT (OUTPATIENT)
Dept: LAB | Facility: OTHER | Age: 83
End: 2023-08-11
Attending: INTERNAL MEDICINE
Payer: MEDICARE

## 2023-08-11 ENCOUNTER — TELEPHONE (OUTPATIENT)
Dept: INTERNAL MEDICINE | Facility: CLINIC | Age: 83
End: 2023-08-11
Payer: MEDICARE

## 2023-08-11 DIAGNOSIS — R94.4 DECREASED GFR: Primary | ICD-10-CM

## 2023-08-11 DIAGNOSIS — N18.9 ACUTE KIDNEY INJURY SUPERIMPOSED ON CKD: ICD-10-CM

## 2023-08-11 DIAGNOSIS — N17.9 ACUTE KIDNEY INJURY SUPERIMPOSED ON CKD: ICD-10-CM

## 2023-08-11 LAB
ANION GAP SERPL CALC-SCNC: 10 MMOL/L (ref 8–16)
BUN SERPL-MCNC: 25 MG/DL (ref 8–23)
CALCIUM SERPL-MCNC: 9.7 MG/DL (ref 8.7–10.5)
CHLORIDE SERPL-SCNC: 107 MMOL/L (ref 95–110)
CO2 SERPL-SCNC: 20 MMOL/L (ref 23–29)
CREAT SERPL-MCNC: 2.3 MG/DL (ref 0.5–1.4)
EST. GFR  (NO RACE VARIABLE): 27 ML/MIN/1.73 M^2
GLUCOSE SERPL-MCNC: 126 MG/DL (ref 70–110)
POTASSIUM SERPL-SCNC: 4.6 MMOL/L (ref 3.5–5.1)
SODIUM SERPL-SCNC: 137 MMOL/L (ref 136–145)

## 2023-08-11 PROCEDURE — 36415 COLL VENOUS BLD VENIPUNCTURE: CPT | Performed by: INTERNAL MEDICINE

## 2023-08-11 PROCEDURE — 80048 BASIC METABOLIC PNL TOTAL CA: CPT | Performed by: INTERNAL MEDICINE

## 2023-08-11 NOTE — TELEPHONE ENCOUNTER
Keep appt with Dr. Gallardo on 8/25 but I recommend since kidney numbers slightly worse, we get additional test with urine and an ultrasound of kidney.

## 2023-08-11 NOTE — TELEPHONE ENCOUNTER
3RD ATTEMPT  Message left for patient to return my call.    Keep appt with Dr. Gallardo on 8/25 but I recommend since kidney numbers slightly worse, we get additional test with urine and an ultrasound of kidney.

## 2023-08-11 NOTE — TELEPHONE ENCOUNTER
Message left for patient to return my call.    Keep appt with Dr. Gallardo on 8/25 but I recommend since kidney numbers slightly worse, we get additional test with urine and an ultrasound of kidney.

## 2023-08-11 NOTE — TELEPHONE ENCOUNTER
2ND ATTEMPT  Message left for patient to return my call.    Keep appt with Dr. Gallardo on 8/25 but I recommend since kidney numbers slightly worse, we get additional test with urine and an ultrasound of kidney.

## 2023-08-22 ENCOUNTER — HOSPITAL ENCOUNTER (OUTPATIENT)
Dept: RADIOLOGY | Facility: OTHER | Age: 83
Discharge: HOME | End: 2023-08-22
Attending: FAMILY MEDICINE
Payer: MEDICARE

## 2023-08-22 DIAGNOSIS — R94.4 DECREASED GFR: ICD-10-CM

## 2023-08-22 PROCEDURE — 76770 US EXAM ABDO BACK WALL COMP: CPT | Mod: TC

## 2023-08-22 PROCEDURE — 76770 US EXAM ABDO BACK WALL COMP: CPT | Mod: 26,,, | Performed by: RADIOLOGY

## 2023-08-22 PROCEDURE — 76770 US RETROPERITONEAL COMPLETE: ICD-10-PCS | Mod: 26,,, | Performed by: RADIOLOGY

## 2023-08-25 ENCOUNTER — TELEPHONE (OUTPATIENT)
Dept: INTERNAL MEDICINE | Facility: CLINIC | Age: 83
End: 2023-08-25
Payer: MEDICARE

## 2023-08-25 ENCOUNTER — OFFICE VISIT (OUTPATIENT)
Dept: INTERNAL MEDICINE | Facility: CLINIC | Age: 83
End: 2023-08-25
Attending: INTERNAL MEDICINE
Payer: MEDICARE

## 2023-08-25 VITALS
OXYGEN SATURATION: 96 % | SYSTOLIC BLOOD PRESSURE: 178 MMHG | WEIGHT: 235.44 LBS | BODY MASS INDEX: 30.21 KG/M2 | HEART RATE: 79 BPM | DIASTOLIC BLOOD PRESSURE: 70 MMHG | HEIGHT: 74 IN

## 2023-08-25 DIAGNOSIS — N18.31 CHRONIC KIDNEY DISEASE, STAGE 3A: Primary | ICD-10-CM

## 2023-08-25 DIAGNOSIS — R06.09 OTHER FORMS OF DYSPNEA: ICD-10-CM

## 2023-08-25 DIAGNOSIS — E11.59 TYPE 2 DIABETES MELLITUS WITH OTHER CIRCULATORY COMPLICATIONS: ICD-10-CM

## 2023-08-25 DIAGNOSIS — N18.4 CHRONIC KIDNEY DISEASE (CKD), STAGE IV (SEVERE): ICD-10-CM

## 2023-08-25 PROCEDURE — 3288F PR FALLS RISK ASSESSMENT DOCUMENTED: ICD-10-PCS | Mod: CPTII,S$GLB,, | Performed by: INTERNAL MEDICINE

## 2023-08-25 PROCEDURE — 3077F PR MOST RECENT SYSTOLIC BLOOD PRESSURE >= 140 MM HG: ICD-10-PCS | Mod: CPTII,S$GLB,, | Performed by: INTERNAL MEDICINE

## 2023-08-25 PROCEDURE — 1101F PR PT FALLS ASSESS DOC 0-1 FALLS W/OUT INJ PAST YR: ICD-10-PCS | Mod: CPTII,S$GLB,, | Performed by: INTERNAL MEDICINE

## 2023-08-25 PROCEDURE — 3077F SYST BP >= 140 MM HG: CPT | Mod: CPTII,S$GLB,, | Performed by: INTERNAL MEDICINE

## 2023-08-25 PROCEDURE — 1126F PR PAIN SEVERITY QUANTIFIED, NO PAIN PRESENT: ICD-10-PCS | Mod: CPTII,S$GLB,, | Performed by: INTERNAL MEDICINE

## 2023-08-25 PROCEDURE — 1159F MED LIST DOCD IN RCRD: CPT | Mod: CPTII,S$GLB,, | Performed by: INTERNAL MEDICINE

## 2023-08-25 PROCEDURE — 1101F PT FALLS ASSESS-DOCD LE1/YR: CPT | Mod: CPTII,S$GLB,, | Performed by: INTERNAL MEDICINE

## 2023-08-25 PROCEDURE — 99214 OFFICE O/P EST MOD 30 MIN: CPT | Mod: S$GLB,,, | Performed by: INTERNAL MEDICINE

## 2023-08-25 PROCEDURE — 3078F DIAST BP <80 MM HG: CPT | Mod: CPTII,S$GLB,, | Performed by: INTERNAL MEDICINE

## 2023-08-25 PROCEDURE — 3288F FALL RISK ASSESSMENT DOCD: CPT | Mod: CPTII,S$GLB,, | Performed by: INTERNAL MEDICINE

## 2023-08-25 PROCEDURE — 1159F PR MEDICATION LIST DOCUMENTED IN MEDICAL RECORD: ICD-10-PCS | Mod: CPTII,S$GLB,, | Performed by: INTERNAL MEDICINE

## 2023-08-25 PROCEDURE — 99999 PR PBB SHADOW E&M-EST. PATIENT-LVL IV: ICD-10-PCS | Mod: PBBFAC,,, | Performed by: INTERNAL MEDICINE

## 2023-08-25 PROCEDURE — 1160F RVW MEDS BY RX/DR IN RCRD: CPT | Mod: CPTII,S$GLB,, | Performed by: INTERNAL MEDICINE

## 2023-08-25 PROCEDURE — 3078F PR MOST RECENT DIASTOLIC BLOOD PRESSURE < 80 MM HG: ICD-10-PCS | Mod: CPTII,S$GLB,, | Performed by: INTERNAL MEDICINE

## 2023-08-25 PROCEDURE — 1126F AMNT PAIN NOTED NONE PRSNT: CPT | Mod: CPTII,S$GLB,, | Performed by: INTERNAL MEDICINE

## 2023-08-25 PROCEDURE — 1160F PR REVIEW ALL MEDS BY PRESCRIBER/CLIN PHARMACIST DOCUMENTED: ICD-10-PCS | Mod: CPTII,S$GLB,, | Performed by: INTERNAL MEDICINE

## 2023-08-25 PROCEDURE — 99999 PR PBB SHADOW E&M-EST. PATIENT-LVL IV: CPT | Mod: PBBFAC,,, | Performed by: INTERNAL MEDICINE

## 2023-08-25 PROCEDURE — 99214 PR OFFICE/OUTPT VISIT, EST, LEVL IV, 30-39 MIN: ICD-10-PCS | Mod: S$GLB,,, | Performed by: INTERNAL MEDICINE

## 2023-08-25 RX ORDER — BNT162B2 ORIGINAL AND OMICRON BA.4/BA.5 .1125; .1125 MG/2.25ML; MG/2.25ML
INJECTION, SUSPENSION INTRAMUSCULAR
COMMUNITY
Start: 2023-03-09

## 2023-08-25 RX ORDER — HYDROCHLOROTHIAZIDE 12.5 MG/1
12.5 TABLET ORAL DAILY
Qty: 90 TABLET | Refills: 1 | Status: SHIPPED | OUTPATIENT
Start: 2023-08-25 | End: 2023-12-18

## 2023-08-25 NOTE — PROGRESS NOTES
"Subjective:       Patient ID: Tip Hurst is a 82 y.o. male.    Chief Complaint: Hypertension    Here for f/u    83 yo with PMHx f prostate CA with biochemical recurrence, HTN, CKD stage 3, Paraproteinemia, gout, elevated alkaline phosphatase.     GFR worsening. Stressed better BP control and needs to f/u with nephrology ASAP. Recent [place]./[place] medical renal disease.    He has no complaints today  PSA due in 2 months this is scheduled we will get our labs then     Patient presents today for routine evaluation, physical, and labs. Patient has no major concerns or complaints today.      ### DM###  Diet controlled                        HGBA1C                   5.5                 07/26/2023 10:34 AM        HGBA1C                   6.5 (H)             01/06/2020 09:35 AM        HGBA1C                   6.3 (H)             06/19/2018 10:30 AM        HGBA1C                   6.1                 05/27/2011 08:24 AM        HGBA1C                   6.1                 09/21/2010 09:13 AM     BG on chemistries have frederick reassuring.      ### IgM monoclonal gammopathy  ###  -24 hr urine studies 2020 with no paraprotein and moderate proteinura   -IFX showed IgM lambda monoclonal band. UPEP is negative for paraprotein bands.   -Patient did not show for BMBx appointment on 2/20/20 8/2022 [facility] heme NP Lidia Adler writes "Certainly could have low grade Lymphoproliferative disorder but given asmptomatic/stable labs would likely monitor anyway. In  Light of age, comorbidites and no overt progression can defer marrow and recommend fu in 6 months (vs annualy since no marrow)     ### Anemia ###  Heme suspects this is AOCD and less likely related to gammopathy.        ### prostate CA ###  Biochemical recurrence with very slow rise in PSA over the last 10 years.  -[facility] (10/2022) Dr West writes "Again, no need for additional treatment such as ADT at this time (or hopefully ever)-Follow-up 6 months with PSA"  -In 2018 he had incidental " finding of a possible bladder lesion on ultrasound. Cystoscopy was normal at that time.      ### CKD ###           ESTGFRAFRICA             45.7 (A)            03/09/2021 08:59 AM        ESTGFRAFRICA             39.9 (A)            08/11/2020 07:41 AM        ESTGFRAFRICA             50 (A)              04/03/2020 08:27 AM        ESTGFRAFRICA             46 (A)              01/16/2020 10:10 AM        ESTGFRAFRICA             46 (A)              01/06/2020 09:35 AM        ESTGFRAFRICA             43 (A)              06/22/2018 02:10 PM        ESTGFRAFRICA             47 (A)              06/19/2018 10:30 AM        ESTGFRAFRICA             56.0 (A)            03/17/2015 09:22 AM        ESTGFRAFRICA             56.0 (A)            12/31/2014 12:05 PM        ESTGFRAFRICA             52.3 (A)            09/05/2013 09:22 AM         Bilateral knee pain and aching of hips once a month      Lidia Adler, NP at 8/4/20   In Light of age, comorbidites and no overt progression can defer marrow and recommend fu in 6 months (vs annualy since no marrow)         Review of Systems   Constitutional:  Negative for appetite change, chills, fever and unexpected weight change.   HENT:  Negative for hearing loss, sore throat and trouble swallowing.    Eyes:  Negative for visual disturbance.   Respiratory:  Negative for cough, chest tightness and shortness of breath.    Cardiovascular:  Negative for chest pain and leg swelling.   Gastrointestinal:  Negative for abdominal pain, blood in stool, constipation, diarrhea, nausea and vomiting.   Endocrine: Negative for polydipsia and polyuria.   Genitourinary:  Negative for decreased urine volume, difficulty urinating, dysuria, frequency and urgency.   Musculoskeletal:  Negative for gait problem.   Skin:  Negative for rash.   Neurological:  Negative for dizziness and numbness.   Psychiatric/Behavioral:  The patient is not nervous/anxious.        Objective:      Vitals:    08/25/23 1146   BP: (!)  "178/70   Pulse: 79   SpO2: 96%   Weight: 106.8 kg (235 lb 7.2 oz)   Height: 6' 2" (1.88 m)      Physical Exam  Vitals and nursing note reviewed.   Constitutional:       General: He is not in acute distress.     Appearance: Normal appearance. He is well-developed.   HENT:      Head: Normocephalic and atraumatic.      Mouth/Throat:      Pharynx: No oropharyngeal exudate.   Eyes:      General: No scleral icterus.     Conjunctiva/sclera: Conjunctivae normal.      Pupils: Pupils are equal, round, and reactive to light.   Neck:      Thyroid: No thyromegaly.   Cardiovascular:      Rate and Rhythm: Normal rate and regular rhythm.      Heart sounds: Normal heart sounds. No murmur heard.  Pulmonary:      Effort: Pulmonary effort is normal.      Breath sounds: Normal breath sounds. No wheezing or rales.   Abdominal:      General: There is no distension.   Musculoskeletal:         General: No tenderness.   Lymphadenopathy:      Cervical: No cervical adenopathy.   Skin:     General: Skin is warm and dry.   Neurological:      Mental Status: He is alert and oriented to person, place, and time.   Psychiatric:         Behavior: Behavior normal.       Assessment:       1. Chronic kidney disease, stage 3a    2. Type 2 diabetes mellitus with other circulatory complications    3. Other forms of dyspnea    4. Chronic kidney disease (CKD), stage IV (severe)        Plan:       Tip was seen today for hypertension.    Diagnoses and all orders for this visit:    Chronic kidney disease, stage 3a  -     Ambulatory referral/consult to Nephrology; Future  -     Basic Metabolic Panel; Future  -     TSH; Future    Type 2 diabetes mellitus with other circulatory complications    Other forms of dyspnea  -     TSH; Future    Chronic kidney disease (CKD), stage IV (severe)   Repeat BMP in 2-4 weeks after starting diuretic.     HTN   Uncontrolled   -    START  hydroCHLOROthiazide (HYDRODIURIL) 12.5 MG Tab; Take 1 tablet (12.5 mg total) by mouth once " daily.           Moe Contreras MD  Internal Medicine-[facility]        Side effects of medication(s) were discussed in detail and patient voiced understanding.  Patient will call back for any issues or complications.

## 2023-08-25 NOTE — TELEPHONE ENCOUNTER
Spoke with pt stating message below:    Dr. Contreras's schedule today allows him to offer you to come in early if you would like. He is in no way asking you to come in early, especially if your schedule does not allow. We just wanted to let you know you are welcome to come in anytime between now and your scheduled time if this works out better for you. We will see you when you get here. If you are unable to come to today's appointment please notify us as soon as possible.

## 2023-08-30 ENCOUNTER — TELEPHONE (OUTPATIENT)
Dept: NEPHROLOGY | Facility: CLINIC | Age: 83
End: 2023-08-30
Payer: MEDICARE

## 2023-08-30 DIAGNOSIS — N18.31 CHRONIC KIDNEY DISEASE, STAGE 3A: Primary | ICD-10-CM

## 2023-08-30 NOTE — TELEPHONE ENCOUNTER
----- Message from Anum Pierre sent at 8/30/2023 11:29 AM CDT -----  Regarding: Change appt time  Contact: [phone]  Hi, pt called to ask for the appt time on 10/05/23 be changed. Pt is asking for an appt at 11am. Pls call the  pt to confirm a yes or no for the appt time change at [phone].

## 2023-08-31 ENCOUNTER — PATIENT OUTREACH (OUTPATIENT)
Dept: ADMINISTRATIVE | Facility: HOSPITAL | Age: 83
End: 2023-08-31
Payer: MEDICARE

## 2023-09-15 ENCOUNTER — TELEPHONE (OUTPATIENT)
Dept: INTERNAL MEDICINE | Facility: CLINIC | Age: 83
End: 2023-09-15
Payer: MEDICARE

## 2023-09-15 ENCOUNTER — LAB VISIT (OUTPATIENT)
Dept: LAB | Facility: HOSPITAL | Age: 83
End: 2023-09-15
Attending: NURSE PRACTITIONER
Payer: MEDICARE

## 2023-09-15 DIAGNOSIS — N18.31 CHRONIC KIDNEY DISEASE, STAGE 3A: ICD-10-CM

## 2023-09-15 DIAGNOSIS — I10 HTN (HYPERTENSION), BENIGN: ICD-10-CM

## 2023-09-15 LAB
ALBUMIN SERPL BCP-MCNC: 3.7 G/DL (ref 3.5–5.2)
ANION GAP SERPL CALC-SCNC: 9 MMOL/L (ref 8–16)
BASOPHILS # BLD AUTO: 0.08 K/UL (ref 0–0.2)
BASOPHILS NFR BLD: 0.9 % (ref 0–1.9)
BUN SERPL-MCNC: 24 MG/DL (ref 8–23)
CALCIUM SERPL-MCNC: 9.7 MG/DL (ref 8.7–10.5)
CHLORIDE SERPL-SCNC: 108 MMOL/L (ref 95–110)
CHOLEST SERPL-MCNC: 148 MG/DL (ref 120–199)
CHOLEST/HDLC SERPL: 4.6 {RATIO} (ref 2–5)
CO2 SERPL-SCNC: 21 MMOL/L (ref 23–29)
CREAT SERPL-MCNC: 1.8 MG/DL (ref 0.5–1.4)
DIFFERENTIAL METHOD: ABNORMAL
EOSINOPHIL # BLD AUTO: 0.4 K/UL (ref 0–0.5)
EOSINOPHIL NFR BLD: 4.4 % (ref 0–8)
ERYTHROCYTE [DISTWIDTH] IN BLOOD BY AUTOMATED COUNT: 14.1 % (ref 11.5–14.5)
EST. GFR  (NO RACE VARIABLE): 37 ML/MIN/1.73 M^2
GLUCOSE SERPL-MCNC: 121 MG/DL (ref 70–110)
HCT VFR BLD AUTO: 33.7 % (ref 40–54)
HDLC SERPL-MCNC: 32 MG/DL (ref 40–75)
HDLC SERPL: 21.6 % (ref 20–50)
HGB BLD-MCNC: 10.7 G/DL (ref 14–18)
IMM GRANULOCYTES # BLD AUTO: 0.02 K/UL (ref 0–0.04)
IMM GRANULOCYTES NFR BLD AUTO: 0.2 % (ref 0–0.5)
LDLC SERPL CALC-MCNC: 97 MG/DL (ref 63–159)
LYMPHOCYTES # BLD AUTO: 2.9 K/UL (ref 1–4.8)
LYMPHOCYTES NFR BLD: 33.1 % (ref 18–48)
MCH RBC QN AUTO: 27.4 PG (ref 27–31)
MCHC RBC AUTO-ENTMCNC: 31.8 G/DL (ref 32–36)
MCV RBC AUTO: 86 FL (ref 82–98)
MONOCYTES # BLD AUTO: 0.7 K/UL (ref 0.3–1)
MONOCYTES NFR BLD: 7.6 % (ref 4–15)
NEUTROPHILS # BLD AUTO: 4.7 K/UL (ref 1.8–7.7)
NEUTROPHILS NFR BLD: 53.8 % (ref 38–73)
NONHDLC SERPL-MCNC: 116 MG/DL
NRBC BLD-RTO: 0 /100 WBC
PHOSPHATE SERPL-MCNC: 3.3 MG/DL (ref 2.7–4.5)
PLATELET # BLD AUTO: 356 K/UL (ref 150–450)
PMV BLD AUTO: 9 FL (ref 9.2–12.9)
POTASSIUM SERPL-SCNC: 5 MMOL/L (ref 3.5–5.1)
RBC # BLD AUTO: 3.91 M/UL (ref 4.6–6.2)
SODIUM SERPL-SCNC: 138 MMOL/L (ref 136–145)
TRIGL SERPL-MCNC: 95 MG/DL (ref 30–150)
WBC # BLD AUTO: 8.65 K/UL (ref 3.9–12.7)

## 2023-09-15 PROCEDURE — 80069 RENAL FUNCTION PANEL: CPT | Performed by: NURSE PRACTITIONER

## 2023-09-15 PROCEDURE — 85025 COMPLETE CBC W/AUTO DIFF WBC: CPT | Performed by: NURSE PRACTITIONER

## 2023-09-15 PROCEDURE — 36415 COLL VENOUS BLD VENIPUNCTURE: CPT | Performed by: INTERNAL MEDICINE

## 2023-09-15 PROCEDURE — 80061 LIPID PANEL: CPT | Performed by: INTERNAL MEDICINE

## 2023-09-15 NOTE — TELEPHONE ENCOUNTER
Informed patient Dr. Gallardo would like to increase his simvastatin dosage from 40 to 80. Pt. ok dosage increase. Pt. scheduled for a nurse visit next Friday, September 22, 2023 at 11:00AM for F/U BP recheck. Patient verbalized understanding.

## 2023-09-15 NOTE — TELEPHONE ENCOUNTER
----- Message from Moe Gallardo MD sent at 9/15/2023  4:27 PM CDT -----  Please notify patient of results:  May I increase your simvastatin to 80mg or change you to lipitor 40mg. LDL cholesterol is too high.  Also  f/u in 7 days for nurse visit for BP check

## 2023-09-15 NOTE — PROGRESS NOTES
Please notify patient of results:  May I increase your simvastatin to 80mg or change you to lipitor 40mg. LDL cholesterol is too high.  Also  f/u in 7 days for nurse visit for BP check

## 2023-09-20 ENCOUNTER — OFFICE VISIT (OUTPATIENT)
Dept: NEPHROLOGY | Facility: CLINIC | Age: 83
End: 2023-09-20
Payer: MEDICARE

## 2023-09-20 DIAGNOSIS — N18.31 CHRONIC KIDNEY DISEASE, STAGE 3A: ICD-10-CM

## 2023-09-20 PROCEDURE — 99499 UNLISTED E&M SERVICE: CPT | Mod: S$GLB,,, | Performed by: NURSE PRACTITIONER

## 2023-09-20 PROCEDURE — 99499 NO LOS: ICD-10-PCS | Mod: S$GLB,,, | Performed by: NURSE PRACTITIONER

## 2023-09-22 ENCOUNTER — CLINICAL SUPPORT (OUTPATIENT)
Dept: FAMILY MEDICINE | Facility: CLINIC | Age: 83
End: 2023-09-22
Payer: MEDICARE

## 2023-09-22 VITALS — DIASTOLIC BLOOD PRESSURE: 60 MMHG | SYSTOLIC BLOOD PRESSURE: 144 MMHG

## 2023-09-22 DIAGNOSIS — I10 HTN (HYPERTENSION), BENIGN: Primary | ICD-10-CM

## 2023-09-22 PROCEDURE — 99999 PR PBB SHADOW E&M-EST. PATIENT-LVL I: CPT | Mod: PBBFAC,,,

## 2023-09-22 PROCEDURE — 99999 PR PBB SHADOW E&M-EST. PATIENT-LVL I: ICD-10-PCS | Mod: PBBFAC,,,

## 2023-09-22 NOTE — PROGRESS NOTES
Tip Hurst 82 y.o. male is here today for Blood Pressure check.   History of HTN yes.    Review of patient's allergies indicates:  No Known Allergies  Creatinine   Date Value Ref Range Status   09/15/2023 1.8 (H) 0.5 - 1.4 mg/dL Final     Sodium   Date Value Ref Range Status   09/15/2023 138 136 - 145 mmol/L Final     Potassium   Date Value Ref Range Status   09/15/2023 5.0 3.5 - 5.1 mmol/L Final   ]  Patient verifies taking blood pressure medications on a regular basis at the same time of the day.     Current Outpatient Medications:     allopurinoL (ZYLOPRIM) 100 MG tablet, Take 1 tablet (100 mg total) by mouth once daily., Disp: 90 tablet, Rfl: 2    cholecalciferol, vitamin D3, 1,250 mcg (50,000 unit) capsule, Take 1 capsule (50,000 Units total) by mouth every 7 days., Disp: 12 capsule, Rfl: 3    hydroCHLOROthiazide (HYDRODIURIL) 12.5 MG Tab, Take 1 tablet (12.5 mg total) by mouth once daily., Disp: 90 tablet, Rfl: 1    levocetirizine (XYZAL) 5 MG tablet, Take 1 tablet (5 mg total) by mouth every evening., Disp: 90 tablet, Rfl: 1    losartan (COZAAR) 100 MG tablet, Take 1 tablet (100 mg total) by mouth once daily., Disp: 90 tablet, Rfl: 1    NIFEdipine (PROCARDIA-XL) 90 MG (OSM) 24 hr tablet, Take 1 tablet (90 mg total) by mouth once daily., Disp: 90 tablet, Rfl: 2    PFIZER COVID BIVAL,12Y UP,,PF, 30 mcg/0.3 mL injection, , Disp: , Rfl:     simvastatin (ZOCOR) 40 MG tablet, TAKE 1 TABLET BY MOUTH EVERY DAY IN THE EVENING, Disp: 90 tablet, Rfl: 3  Does patient have record of home blood pressure readings no. Readings have been averaging did not bring readings.   Last dose of blood pressure medication was taken at yesterday, he didn't take medications this morning, thought he had to fast.  Patient is asymptomatic.   Complains of no complaints.    Vitals:    09/22/23 1038 09/22/23 1055   BP: (!) 160/60 (!) 144/60   BP Location: Right arm Right arm   Patient Position: Sitting Sitting   BP Method: Large (Manual)  Large (Manual)         Dr. Gallardo informed of nurse visit.

## 2023-09-27 ENCOUNTER — OFFICE VISIT (OUTPATIENT)
Dept: NEPHROLOGY | Facility: CLINIC | Age: 83
End: 2023-09-27
Payer: MEDICARE

## 2023-09-27 VITALS
OXYGEN SATURATION: 97 % | HEART RATE: 103 BPM | WEIGHT: 236.13 LBS | BODY MASS INDEX: 30.32 KG/M2 | DIASTOLIC BLOOD PRESSURE: 64 MMHG | SYSTOLIC BLOOD PRESSURE: 191 MMHG

## 2023-09-27 DIAGNOSIS — N18.32 ANEMIA IN STAGE 3B CHRONIC KIDNEY DISEASE: ICD-10-CM

## 2023-09-27 DIAGNOSIS — M10.00 IDIOPATHIC GOUT, UNSPECIFIED CHRONICITY, UNSPECIFIED SITE: ICD-10-CM

## 2023-09-27 DIAGNOSIS — N18.32 STAGE 3B CHRONIC KIDNEY DISEASE: Primary | ICD-10-CM

## 2023-09-27 DIAGNOSIS — R80.9 PROTEINURIA, UNSPECIFIED TYPE: ICD-10-CM

## 2023-09-27 DIAGNOSIS — D47.2 MGUS (MONOCLONAL GAMMOPATHY OF UNKNOWN SIGNIFICANCE): ICD-10-CM

## 2023-09-27 DIAGNOSIS — E11.22 TYPE 2 DM WITH CKD STAGE 3 AND HYPERTENSION: ICD-10-CM

## 2023-09-27 DIAGNOSIS — D63.1 ANEMIA IN STAGE 3B CHRONIC KIDNEY DISEASE: ICD-10-CM

## 2023-09-27 DIAGNOSIS — N18.30 TYPE 2 DM WITH CKD STAGE 3 AND HYPERTENSION: ICD-10-CM

## 2023-09-27 DIAGNOSIS — I12.9 TYPE 2 DM WITH CKD STAGE 3 AND HYPERTENSION: ICD-10-CM

## 2023-09-27 PROCEDURE — 1159F MED LIST DOCD IN RCRD: CPT | Mod: CPTII,S$GLB,, | Performed by: NURSE PRACTITIONER

## 2023-09-27 PROCEDURE — 1159F PR MEDICATION LIST DOCUMENTED IN MEDICAL RECORD: ICD-10-PCS | Mod: CPTII,S$GLB,, | Performed by: NURSE PRACTITIONER

## 2023-09-27 PROCEDURE — 1101F PT FALLS ASSESS-DOCD LE1/YR: CPT | Mod: CPTII,S$GLB,, | Performed by: NURSE PRACTITIONER

## 2023-09-27 PROCEDURE — 99999 PR PBB SHADOW E&M-EST. PATIENT-LVL III: ICD-10-PCS | Mod: PBBFAC,,, | Performed by: NURSE PRACTITIONER

## 2023-09-27 PROCEDURE — 1126F PR PAIN SEVERITY QUANTIFIED, NO PAIN PRESENT: ICD-10-PCS | Mod: CPTII,S$GLB,, | Performed by: NURSE PRACTITIONER

## 2023-09-27 PROCEDURE — 3078F PR MOST RECENT DIASTOLIC BLOOD PRESSURE < 80 MM HG: ICD-10-PCS | Mod: CPTII,S$GLB,, | Performed by: NURSE PRACTITIONER

## 2023-09-27 PROCEDURE — 1101F PR PT FALLS ASSESS DOC 0-1 FALLS W/OUT INJ PAST YR: ICD-10-PCS | Mod: CPTII,S$GLB,, | Performed by: NURSE PRACTITIONER

## 2023-09-27 PROCEDURE — 3288F PR FALLS RISK ASSESSMENT DOCUMENTED: ICD-10-PCS | Mod: CPTII,S$GLB,, | Performed by: NURSE PRACTITIONER

## 2023-09-27 PROCEDURE — 3078F DIAST BP <80 MM HG: CPT | Mod: CPTII,S$GLB,, | Performed by: NURSE PRACTITIONER

## 2023-09-27 PROCEDURE — 99204 OFFICE O/P NEW MOD 45 MIN: CPT | Mod: S$GLB,,, | Performed by: NURSE PRACTITIONER

## 2023-09-27 PROCEDURE — 3288F FALL RISK ASSESSMENT DOCD: CPT | Mod: CPTII,S$GLB,, | Performed by: NURSE PRACTITIONER

## 2023-09-27 PROCEDURE — 1126F AMNT PAIN NOTED NONE PRSNT: CPT | Mod: CPTII,S$GLB,, | Performed by: NURSE PRACTITIONER

## 2023-09-27 PROCEDURE — 99204 PR OFFICE/OUTPT VISIT, NEW, LEVL IV, 45-59 MIN: ICD-10-PCS | Mod: S$GLB,,, | Performed by: NURSE PRACTITIONER

## 2023-09-27 PROCEDURE — 99999 PR PBB SHADOW E&M-EST. PATIENT-LVL III: CPT | Mod: PBBFAC,,, | Performed by: NURSE PRACTITIONER

## 2023-09-27 PROCEDURE — 3077F SYST BP >= 140 MM HG: CPT | Mod: CPTII,S$GLB,, | Performed by: NURSE PRACTITIONER

## 2023-09-27 PROCEDURE — 3077F PR MOST RECENT SYSTOLIC BLOOD PRESSURE >= 140 MM HG: ICD-10-PCS | Mod: CPTII,S$GLB,, | Performed by: NURSE PRACTITIONER

## 2023-09-27 RX ORDER — SPIRONOLACTONE 25 MG/1
25 TABLET ORAL DAILY
Qty: 30 TABLET | Refills: 11 | Status: SHIPPED | OUTPATIENT
Start: 2023-09-27 | End: 2024-03-06 | Stop reason: ALTCHOICE

## 2023-09-27 NOTE — PROGRESS NOTES
Subjective:       Patient ID: Tip Hurst is a 82 y.o. Black or  male who presents for re-evaluation of CKD 3.      HPI     Patient is new to me. Last seen by Dr. Morales in July 2020.  Prior pertinent chart reviewed since this is patient's first appointment with me.    Patient presents for re-evaluation of CKD 3.  Baseline creatinine of 1.5-1.8 since 2018.    Significant other medical problems include T2DM (controlled), HTN, MGUS, BPH, HLD, gout, h/o prostate cancer, atherosclerosis of aorta.      The patient denies taking NSAIDs, herbal supplements, or new antibiotics, recreational drugs, recent episode of dehydration, diarrhea, nausea or vomiting, acute illness, hospitalization or exposure to IV radiocontrast. Denies recent gout flares. Has chronic swelling of RLE. Reports compliance with antihypertensives. SBP ranges 160-170s.     Significant family hx includes: No reported history of renal disease    Last renal US: 8/22/23, reviewed.  FINDINGS:  Right kidney: The right kidney measures 10.3 cm. There is cortical thinning. No loss of corticomedullary distinction. Resistive index measures 0.92.  No mass. No renal stone. No hydronephrosis.     Left kidney: The left kidney measures 10.7 cm. There is cortical thinning. No loss of corticomedullary distinction. Resistive index measures 0.86.  No mass. No renal stone. No hydronephrosis.     The bladder is partially distended at the time of scanning and has an unremarkable appearance.     Limited evaluation of the liver suggests steatosis.     Impression:     Medical-renal disease.     Limited evaluation of the liver in the right upper quadrant suggests hepatic steatosis.      Review of Systems   Respiratory:  Positive for shortness of breath (with exertion).    Cardiovascular:  Positive for leg swelling (RLE). Negative for chest pain.   Gastrointestinal:  Negative for diarrhea, nausea and vomiting.   Genitourinary:  Negative for difficulty urinating,  dysuria and hematuria.   Musculoskeletal:  Negative for arthralgias and joint swelling.   All other systems reviewed and are negative.      Objective:       Blood pressure (!) 191/64, pulse 103, weight 107.1 kg (236 lb 1.8 oz), SpO2 97 %. /72 on repeat.   Physical Exam  Vitals reviewed.   Constitutional:       General: He is not in acute distress.     Appearance: Normal appearance.   HENT:      Head: Normocephalic and atraumatic.   Eyes:      General: No scleral icterus.  Cardiovascular:      Rate and Rhythm: Normal rate.   Pulmonary:      Effort: Pulmonary effort is normal. No respiratory distress.   Musculoskeletal:      Right lower leg: Edema (trace) present.      Left lower leg: No edema.   Skin:     General: Skin is warm and dry.   Neurological:      Mental Status: He is alert and oriented to person, place, and time.   Psychiatric:         Mood and Affect: Mood normal.         Behavior: Behavior normal.           Lab Results   Component Value Date    CREATININE 1.8 (H) 09/15/2023    URICACID 8.3 (H) 07/26/2023     Prot/Creat Ratio, Urine   Date Value Ref Range Status   09/16/2023 0.95 (H) 0.00 - 0.20 Final     Lab Results   Component Value Date     09/15/2023    K 5.0 09/15/2023    CO2 21 (L) 09/15/2023     09/15/2023     Lab Results   Component Value Date    CALCIUM 9.7 09/15/2023    PHOS 3.3 09/15/2023     Lab Results   Component Value Date    HGB 10.7 (L) 09/15/2023    WBC 8.65 09/15/2023    HCT 33.7 (L) 09/15/2023      Lab Results   Component Value Date    HGBA1C 5.5 07/26/2023     09/15/2023    BUN 24 (H) 09/15/2023     Lab Results   Component Value Date    LDLCALC 97.0 09/15/2023         Assessment:       1. Stage 3b chronic kidney disease    2. Proteinuria, unspecified type    3. 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 Type 2 DM with CKD stage 3 and hypertension    5. Idiopathic gout, unspecified chronicity, unspecified site    6. MGUS (monoclonal gammopathy of unknown  significance)        Plan:   CKD stage 3b c eGFR 37 mL/min -  - Baseline sCr ~1.5-1.8 since 2018. 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   - Following with hem/onc for IgM monoclonal gammopathy. Ratio has remained WNL. Less likely contributing to renal dysfunction.   - Avoid NSAIDs, maintain hydration, recommend further BP control with changes as below     UPCR 950mg; continue max ARB. Initiate MRA as below.    Acid-base Bicarb 21   Secondary hyperparathyroidism Ca and phos stable. Monitor PTH.    Anemia Hgb at CKD goal   DM A1C controlled    Lipid Management On statin   ESRD planning Provided education about the stages of CKD, usual progression, and need to monitor for and treat CKD-related disease in an effort to delay progression of CKD.        HTN - Elevated. Above goal of < 130/80.   - Discussed low sodium diet (eating high sodium foods).  - Continue losartan 100, HCTZ 12.5, nifedipine 90  - Rx spironolactone 25mg  - Repeat BMP in 3-4 weeks     Gout - No recent flares. Maintained on allopurinol 100mg qd. Continue. Monitor uric acid.      MGUS - defer to hem/onc. Appears stable.     All questions patient had were answered.  Asked if further questions. None. F/u in clinic 3 months  with labs and urine prior to next visit or sooner if needed.  ER for emergency concerns.    Summary of Plan:  - Rx spironolactone 25 mg qd. Monitor BP at home.  - BMP in 3-4 weeks  - RTC 3 months with standard labs and urine.

## 2023-09-28 DIAGNOSIS — E78.00 HYPERCHOLESTEROLEMIA: ICD-10-CM

## 2023-09-28 RX ORDER — SIMVASTATIN 40 MG/1
40 TABLET, FILM COATED ORAL NIGHTLY
Qty: 90 TABLET | Refills: 3 | Status: SHIPPED | OUTPATIENT
Start: 2023-09-28

## 2023-09-28 NOTE — TELEPHONE ENCOUNTER
No care due was identified.  [facility] Embedded Care Due Messages. Reference number: [id number].   9/28/2023 7:29:25 AM CDT

## 2023-09-28 NOTE — TELEPHONE ENCOUNTER
Spoke to patient he went to pharmacy to  his simvastatin, he didn't get it because it wasn't increased as Dr. Gallardo said. Called pharmacy to make sure they had the new rx for simvastatin 80mg they will fill that rx for the patient

## 2023-09-28 NOTE — TELEPHONE ENCOUNTER
----- Message from Rama Trejo sent at 9/28/2023  2:47 PM CDT -----  Contact: [phone]  [email or web address]     Patient is calling for Medical Advice regarding pt states medication was suppose to be increased     How long has patient had these symptoms: he went today to pharmacy     Pharmacy name and phone#:  [id number] [address] [employer] [facility]  [address] [address]  Phone: [phone] Fax: [phone]       Would like response via [place]:  no     Comments:pt states this medication was supposed to be increased simvastatin (ZOCOR) 40 MG tablet he states he went today and it is not increased please give return call

## 2023-09-28 NOTE — TELEPHONE ENCOUNTER
Refill Decision Note   Tip Hurst  is requesting a refill authorization.  Brief Assessment and Rationale for Refill:  Approve     Medication Therapy Plan:         Comments:     Note composed:9:41 AM 09/28/2023

## 2023-10-10 ENCOUNTER — LAB VISIT (OUTPATIENT)
Dept: LAB | Facility: HOSPITAL | Age: 83
End: 2023-10-10
Attending: UROLOGY
Payer: MEDICARE

## 2023-10-10 DIAGNOSIS — R06.09 OTHER FORMS OF DYSPNEA: ICD-10-CM

## 2023-10-10 DIAGNOSIS — C61 PROSTATE CANCER: ICD-10-CM

## 2023-10-10 DIAGNOSIS — N18.31 CHRONIC KIDNEY DISEASE, STAGE 3A: ICD-10-CM

## 2023-10-10 DIAGNOSIS — N18.32 STAGE 3B CHRONIC KIDNEY DISEASE: ICD-10-CM

## 2023-10-10 LAB
PROSTATE SPECIFIC ANTIGEN, TOTAL: 0.58 NG/ML (ref 0–4)
PSA FREE MFR SERPL: 17.24 %
PSA FREE SERPL-MCNC: 0.1 NG/ML (ref 0–1.5)

## 2023-10-10 PROCEDURE — 84443 ASSAY THYROID STIM HORMONE: CPT | Performed by: INTERNAL MEDICINE

## 2023-10-10 PROCEDURE — 84154 ASSAY OF PSA FREE: CPT | Performed by: UROLOGY

## 2023-10-10 PROCEDURE — 84439 ASSAY OF FREE THYROXINE: CPT | Performed by: INTERNAL MEDICINE

## 2023-10-10 PROCEDURE — 80048 BASIC METABOLIC PNL TOTAL CA: CPT | Performed by: NURSE PRACTITIONER

## 2023-10-10 PROCEDURE — 36415 COLL VENOUS BLD VENIPUNCTURE: CPT | Mod: PO | Performed by: INTERNAL MEDICINE

## 2023-10-10 NOTE — PROGRESS NOTES
Patient accidentally scheduled for [facility] evaluation. Not seen today. Appointment rescheduled.

## 2023-10-11 ENCOUNTER — TELEPHONE (OUTPATIENT)
Dept: NEPHROLOGY | Facility: CLINIC | Age: 83
End: 2023-10-11
Payer: MEDICARE

## 2023-10-11 LAB
ANION GAP SERPL CALC-SCNC: 15 MMOL/L (ref 8–16)
BUN SERPL-MCNC: 27 MG/DL (ref 8–23)
CALCIUM SERPL-MCNC: 10 MG/DL (ref 8.7–10.5)
CHLORIDE SERPL-SCNC: 108 MMOL/L (ref 95–110)
CO2 SERPL-SCNC: 15 MMOL/L (ref 23–29)
CREAT SERPL-MCNC: 1.9 MG/DL (ref 0.5–1.4)
EST. GFR  (NO RACE VARIABLE): 34.4 ML/MIN/1.73 M^2
GLUCOSE SERPL-MCNC: 156 MG/DL (ref 70–110)
POTASSIUM SERPL-SCNC: 4.9 MMOL/L (ref 3.5–5.1)
SODIUM SERPL-SCNC: 138 MMOL/L (ref 136–145)
T4 FREE SERPL-MCNC: 0.84 NG/DL (ref 0.71–1.51)
TSH SERPL DL<=0.005 MIU/L-ACNC: 4.21 UIU/ML (ref 0.4–4)

## 2023-10-12 ENCOUNTER — OFFICE VISIT (OUTPATIENT)
Dept: UROLOGY | Facility: CLINIC | Age: 83
End: 2023-10-12
Attending: UROLOGY
Payer: MEDICARE

## 2023-10-12 VITALS
BODY MASS INDEX: 30.64 KG/M2 | WEIGHT: 238.75 LBS | HEIGHT: 74 IN | HEART RATE: 84 BPM | DIASTOLIC BLOOD PRESSURE: 79 MMHG | SYSTOLIC BLOOD PRESSURE: 191 MMHG | OXYGEN SATURATION: 98 %

## 2023-10-12 DIAGNOSIS — C61 PROSTATE CANCER: Primary | ICD-10-CM

## 2023-10-12 PROCEDURE — 99214 OFFICE O/P EST MOD 30 MIN: CPT | Mod: S$GLB,,, | Performed by: UROLOGY

## 2023-10-12 PROCEDURE — 99214 PR OFFICE/OUTPT VISIT, EST, LEVL IV, 30-39 MIN: ICD-10-PCS | Mod: S$GLB,,, | Performed by: UROLOGY

## 2023-10-12 PROCEDURE — 1126F AMNT PAIN NOTED NONE PRSNT: CPT | Mod: CPTII,S$GLB,, | Performed by: UROLOGY

## 2023-10-12 PROCEDURE — 1126F PR PAIN SEVERITY QUANTIFIED, NO PAIN PRESENT: ICD-10-PCS | Mod: CPTII,S$GLB,, | Performed by: UROLOGY

## 2023-10-12 PROCEDURE — 1160F PR REVIEW ALL MEDS BY PRESCRIBER/CLIN PHARMACIST DOCUMENTED: ICD-10-PCS | Mod: CPTII,S$GLB,, | Performed by: UROLOGY

## 2023-10-12 PROCEDURE — 1159F PR MEDICATION LIST DOCUMENTED IN MEDICAL RECORD: ICD-10-PCS | Mod: CPTII,S$GLB,, | Performed by: UROLOGY

## 2023-10-12 PROCEDURE — 3077F SYST BP >= 140 MM HG: CPT | Mod: CPTII,S$GLB,, | Performed by: UROLOGY

## 2023-10-12 PROCEDURE — 1160F RVW MEDS BY RX/DR IN RCRD: CPT | Mod: CPTII,S$GLB,, | Performed by: UROLOGY

## 2023-10-12 PROCEDURE — 3077F PR MOST RECENT SYSTOLIC BLOOD PRESSURE >= 140 MM HG: ICD-10-PCS | Mod: CPTII,S$GLB,, | Performed by: UROLOGY

## 2023-10-12 PROCEDURE — 3078F PR MOST RECENT DIASTOLIC BLOOD PRESSURE < 80 MM HG: ICD-10-PCS | Mod: CPTII,S$GLB,, | Performed by: UROLOGY

## 2023-10-12 PROCEDURE — 1159F MED LIST DOCD IN RCRD: CPT | Mod: CPTII,S$GLB,, | Performed by: UROLOGY

## 2023-10-12 PROCEDURE — 3078F DIAST BP <80 MM HG: CPT | Mod: CPTII,S$GLB,, | Performed by: UROLOGY

## 2023-10-12 RX ORDER — SODIUM BICARBONATE 650 MG/1
650 TABLET ORAL 2 TIMES DAILY
Qty: 60 TABLET | Refills: 11 | Status: SHIPPED | OUTPATIENT
Start: 2023-10-12 | End: 2024-10-11

## 2023-10-12 NOTE — PROGRESS NOTES
"Subjective:      Tip Hurst is a 82 y.o. male who returns today for a f/u    He has history of prostate cancer s/p prostatectomy in 2006. No known recurrence. No adjuvant treatments reported. Path unknown.  He has had biochemical recurrence per PSA testing beginning in 2010 with a very slow rise in PSA in the interim.      In 2018 he had incidental finding of a possible bladder lesion on ultrasound.  Cystoscopy was normal at that time.    Today he has no c/o. Here to review recent PSA.    The following portions of the patient's history were reviewed and updated as appropriate: allergies, current medications, past family history, past medical history, past social history, past surgical history and problem list.    Review of Systems  A comprehensive multipoint review of systems was negative except as otherwise stated in the HPI.     Objective:   Vitals: BP (!) 191/79 (BP Location: Right arm, Patient Position: Sitting, BP Method: Large (Automatic))   Pulse 84   Ht 6' 2" (1.88 m)   Wt 108.3 kg (238 lb 12.1 oz)   SpO2 98%   BMI 30.65 kg/m²     Physical Exam   General: alert and oriented, no acute distress  Respiratory: Symmetric expansion, non-labored breathing  Neuro: no gross deficits  Psych: normal judgment and insight, normal mood/affect and non-anxious    Lab Review     Lab Results   Component Value Date    WBC 8.65 09/15/2023    HGB 10.7 (L) 09/15/2023    HCT 33.7 (L) 09/15/2023    MCV 86 09/15/2023     09/15/2023     Lab Results   Component Value Date    CREATININE 1.9 (H) 10/10/2023    BUN 27 (H) 10/10/2023     Component PSA, SCREEN PSA DIAGNOSTIC   Latest Ref Rng & Units 0.00 - 4.00 ng/mL 0.00 - 4.00 ng/mL   10/10/2023  0.58   4/6/2023  1.6   9/26/2022  1.1   3/18/2020  0.39   1/16/2020  0.41   9/13/2019  0.35   3/14/2019  0.31   8/21/2018  0.25   9/5/2013 0.26    7/23/2013 0.25    8/21/2012 0.18    5/4/2011 0.03    9/21/2010 0.02    9/25/2009 <0.01    8/6/2008 <0.01    8/9/2007 <0.1    7/13/2006 " 6.6 (H)    4/19/2005 4.2 (H)        Assessment and Plan:   1. Prostate cancer  -- Biochemical recurrence with very slow rise in PSA over the last 12 years.  -- Previously decision made to not pursue additional treatment such as ADT at this time (or hopefully ever)  -- PSA today  -- PSA in 6 mos

## 2023-10-23 NOTE — PROGRESS NOTES
Please notify patient of results:  Thyroid studies, while not entirely normal, are not consistent with an underactive thyroid and is of no concern at this time. No need for additional evaluation or treatment at this time. I will continue to keep an eye on this.    Respectfully,  Moe Contreras

## 2023-10-24 ENCOUNTER — TELEPHONE (OUTPATIENT)
Dept: INTERNAL MEDICINE | Facility: CLINIC | Age: 83
End: 2023-10-24
Payer: MEDICARE

## 2023-10-24 NOTE — TELEPHONE ENCOUNTER
----- Message from Moe Contreras MD sent at 10/23/2023 10:12 AM CDT -----  Please notify patient of results:  Thyroid studies, while not entirely normal, are not consistent with an underactive thyroid and is of no concern at this time. No need for additional evaluation or treatment at this time. I will continue to keep an eye on this.    Respectfully,  Moe Contreras

## 2023-10-24 NOTE — TELEPHONE ENCOUNTER
[facility] for Mr. Hurst to call . Need to discuss the listed message from Dr. Gallardo with him.    From Dr. Gallardo    Please notify patient of results:   Thyroid studies, while not entirely normal, are not consistent with an underactive thyroid and is of no concern at this time. No need for additional evaluation or treatment at this time. I will continue to keep an eye on this.

## 2023-10-25 NOTE — TELEPHONE ENCOUNTER
Second attempt. [facility] for Mr. Hurst to call . Need to give message from Dr. Gallardo regarding Thyroid Studies.

## 2023-12-14 ENCOUNTER — PATIENT OUTREACH (OUTPATIENT)
Dept: ADMINISTRATIVE | Facility: HOSPITAL | Age: 83
End: 2023-12-14
Payer: MEDICARE

## 2023-12-17 NOTE — TELEPHONE ENCOUNTER
Refill Routing Note   Medication(s) are not appropriate for processing by [facility] for the following reason(s):        Required labs abnormal  Required vitals abnormal    [facility] action(s):  Defer        Medication Therapy Plan: BP 10/12/23 (!) 191/79      Appointments  past 12m or future 3m with PCP    Date Provider   Last Visit   8/25/2023 Moe Gallardo MD   Next Visit   Visit date not found Moe Gallardo MD   ED visits in past 90 days: 0        Note composed:5:24 PM 12/17/2023

## 2023-12-18 VITALS — DIASTOLIC BLOOD PRESSURE: 85 MMHG | SYSTOLIC BLOOD PRESSURE: 196 MMHG

## 2023-12-18 RX ORDER — HYDROCHLOROTHIAZIDE 12.5 MG/1
12.5 TABLET ORAL
Qty: 90 TABLET | Refills: 1 | Status: SHIPPED | OUTPATIENT
Start: 2023-12-18

## 2023-12-18 NOTE — TELEPHONE ENCOUNTER
Called and spoke with Mr. Whelan to obtain a recent home blood pressure reading. Per patient, remote blood pressure reading is 220/88. Repeated blood pressure 196/85. Inquired if patient took his blood pressure medication, he reports No. Instructed patient to take blood pressure medication and to recheck blood pressure again in an hour and to call office with new reading. RN will call patient back in an hour to obtain updated remote blood pressure reading.

## 2023-12-18 NOTE — TELEPHONE ENCOUNTER
Requested refill has been sent to pharmacy. If patient is due for visit then please schedule appointment. If not then please contact patient and see if they have home blood pressure readings or the ability to check their blood pressure at home. Please obtain their most recent blood pressure along with an average. If they do not have home BP then please schedule follow up within 7-10 days for nurse visit for BP check. Please make sure patient has all meds prescribed and is taking all medications prior to their nurse visit.     Respectfully,  Moe Contreras

## 2024-01-17 ENCOUNTER — TELEPHONE (OUTPATIENT)
Dept: UROLOGY | Facility: CLINIC | Age: 84
End: 2024-01-17
Payer: MEDICARE

## 2024-01-17 DIAGNOSIS — R06.02 SOB (SHORTNESS OF BREATH): ICD-10-CM

## 2024-01-17 RX ORDER — LEVOCETIRIZINE DIHYDROCHLORIDE 5 MG/1
5 TABLET, FILM COATED ORAL NIGHTLY
Qty: 90 TABLET | Refills: 2 | Status: SHIPPED | OUTPATIENT
Start: 2024-01-17

## 2024-01-17 NOTE — TELEPHONE ENCOUNTER
No care due was identified.  [facility] Embedded Care Due Messages. Reference number: [id number].   1/17/2024 12:08:02 AM CST

## 2024-01-17 NOTE — TELEPHONE ENCOUNTER
Refill Routing Note   Medication(s) are not appropriate for processing by [facility] for the following reason(s):        Drug-disease interaction: levocetirizine and Chronic kidney disease (CKD), stage IV (severe)  [provider has not previously overridden]    [facility] action(s):  Defer        Medication Therapy Plan:       Pharmacist review requested: Yes     Appointments  past 12m or future 3m with PCP    Date Provider   Last Visit   8/25/2023 Moe Gallardo MD   Next Visit   Visit date not found Moe Gallardo MD   ED visits in past 90 days: 0        Note composed:2:23 PM 01/17/2024

## 2024-01-17 NOTE — TELEPHONE ENCOUNTER
Refill Decision Note   Tip Hurst  is requesting a refill authorization.  Brief Assessment and Rationale for Refill:  Approve     Medication Therapy Plan:         Pharmacist review requested: Yes   Comments:     Note composed:2:58 PM 01/17/2024

## 2024-02-23 DIAGNOSIS — N18.32 STAGE 3B CHRONIC KIDNEY DISEASE: Primary | ICD-10-CM

## 2024-02-26 ENCOUNTER — OFFICE VISIT (OUTPATIENT)
Dept: NEPHROLOGY | Facility: CLINIC | Age: 84
End: 2024-02-26
Payer: MEDICARE

## 2024-02-26 ENCOUNTER — LAB VISIT (OUTPATIENT)
Dept: LAB | Facility: HOSPITAL | Age: 84
End: 2024-02-26
Payer: MEDICARE

## 2024-02-26 VITALS
DIASTOLIC BLOOD PRESSURE: 77 MMHG | OXYGEN SATURATION: 96 % | SYSTOLIC BLOOD PRESSURE: 160 MMHG | WEIGHT: 231.5 LBS | HEART RATE: 86 BPM | BODY MASS INDEX: 29.72 KG/M2

## 2024-02-26 DIAGNOSIS — D63.1 ANEMIA IN STAGE 3B CHRONIC KIDNEY DISEASE: ICD-10-CM

## 2024-02-26 DIAGNOSIS — N18.30 TYPE 2 DM WITH CKD STAGE 3 AND HYPERTENSION: ICD-10-CM

## 2024-02-26 DIAGNOSIS — I12.9 TYPE 2 DM WITH CKD STAGE 3 AND HYPERTENSION: ICD-10-CM

## 2024-02-26 DIAGNOSIS — N18.32 ANEMIA IN STAGE 3B CHRONIC KIDNEY DISEASE: ICD-10-CM

## 2024-02-26 DIAGNOSIS — R80.9 PROTEINURIA, UNSPECIFIED TYPE: ICD-10-CM

## 2024-02-26 DIAGNOSIS — M10.00 IDIOPATHIC GOUT, UNSPECIFIED CHRONICITY, UNSPECIFIED SITE: ICD-10-CM

## 2024-02-26 DIAGNOSIS — N18.32 STAGE 3B CHRONIC KIDNEY DISEASE: Primary | ICD-10-CM

## 2024-02-26 DIAGNOSIS — E11.22 TYPE 2 DM WITH CKD STAGE 3 AND HYPERTENSION: ICD-10-CM

## 2024-02-26 DIAGNOSIS — D47.2 MGUS (MONOCLONAL GAMMOPATHY OF UNKNOWN SIGNIFICANCE): ICD-10-CM

## 2024-02-26 DIAGNOSIS — N18.32 STAGE 3B CHRONIC KIDNEY DISEASE: ICD-10-CM

## 2024-02-26 PROCEDURE — 99999 PR PBB SHADOW E&M-EST. PATIENT-LVL III: CPT | Mod: PBBFAC,,, | Performed by: NURSE PRACTITIONER

## 2024-02-26 PROCEDURE — 99214 OFFICE O/P EST MOD 30 MIN: CPT | Mod: S$GLB,,, | Performed by: NURSE PRACTITIONER

## 2024-02-26 NOTE — PROGRESS NOTES
Subjective:       Patient ID: Tip Hurst is a 83 y.o. Black or  male who presents for re-evaluation of CKD 3.      HPI     Patient is new to me. Last seen by Dr. Morales in July 2020.  Prior pertinent chart reviewed since this is patient's first appointment with me.    Patient presents for re-evaluation of CKD 3.  Baseline creatinine of 1.5-1.8 since 2018.    Significant other medical problems include T2DM (controlled), HTN, MGUS, BPH, HLD, gout, h/o prostate cancer, atherosclerosis of aorta.      The patient denies taking NSAIDs, herbal supplements, or new antibiotics, recreational drugs, recent episode of dehydration, diarrhea, nausea or vomiting, acute illness, hospitalization or exposure to IV radiocontrast. Denies recent gout flares. Has chronic swelling of RLE. Reports compliance with antihypertensives. SBP ranges 160-170s.     Significant family hx includes: No reported history of renal disease    Last renal US: 8/22/23, reviewed.  FINDINGS:  Right kidney: The right kidney measures 10.3 cm. There is cortical thinning. No loss of corticomedullary distinction. Resistive index measures 0.92.  No mass. No renal stone. No hydronephrosis.     Left kidney: The left kidney measures 10.7 cm. There is cortical thinning. No loss of corticomedullary distinction. Resistive index measures 0.86.  No mass. No renal stone. No hydronephrosis.     The bladder is partially distended at the time of scanning and has an unremarkable appearance.     Limited evaluation of the liver suggests steatosis.     Impression:     Medical-renal disease.     Limited evaluation of the liver in the right upper quadrant suggests hepatic steatosis.    Update 2/26/24:  - Presents for follow-up of CKD with his son. sCr on today's labs 1.8.  - Compliant with BP medications. Remains above goal. Unsure of the names of his medications.   - No new complaints.       Review of Systems   Respiratory:  Negative for shortness of breath.     Cardiovascular:  Negative for chest pain and leg swelling.   Gastrointestinal:  Negative for diarrhea, nausea and vomiting.   Genitourinary:  Negative for difficulty urinating, dysuria and hematuria.   All other systems reviewed and are negative.      Objective:       Blood pressure (!) 160/77, pulse 86, weight 105 kg (231 lb 7.7 oz), SpO2 96 %.   Physical Exam  Vitals reviewed.   Constitutional:       General: He is not in acute distress.     Appearance: Normal appearance.   HENT:      Head: Normocephalic and atraumatic.   Eyes:      General: No scleral icterus.  Cardiovascular:      Rate and Rhythm: Normal rate.   Pulmonary:      Effort: Pulmonary effort is normal. No respiratory distress.   Musculoskeletal:      Right lower leg: No edema.      Left lower leg: No edema.   Skin:     General: Skin is warm and dry.   Neurological:      Mental Status: He is alert and oriented to person, place, and time.   Psychiatric:         Mood and Affect: Mood normal.         Behavior: Behavior normal.           Lab Results   Component Value Date    CREATININE 1.8 (H) 02/26/2024    URICACID 8.3 (H) 07/26/2023     Prot/Creat Ratio, Urine   Date Value Ref Range Status   02/26/2024 0.93 (H) 0.00 - 0.20 Final   09/16/2023 0.95 (H) 0.00 - 0.20 Final     Lab Results   Component Value Date     02/26/2024    K 4.7 02/26/2024    CO2 23 02/26/2024     02/26/2024     Lab Results   Component Value Date    CALCIUM 9.6 02/26/2024    PHOS 3.4 02/26/2024     Lab Results   Component Value Date    HGB 10.3 (L) 02/26/2024    WBC 7.93 02/26/2024    HCT 32.8 (L) 02/26/2024      Lab Results   Component Value Date    HGBA1C 5.5 07/26/2023     02/26/2024    BUN 27 (H) 02/26/2024     Lab Results   Component Value Date    LDLCALC 97.0 09/15/2023         Assessment:       1. Stage 3b chronic kidney disease    2. Proteinuria, unspecified type    3. 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 Type 2 DM with CKD stage 3 and  hypertension    5. Idiopathic gout, unspecified chronicity, unspecified site    6. MGUS (monoclonal gammopathy of unknown significance)          Plan:   CKD stage 3b  -  - Baseline sCr ~1.5-1.8 since 2018. 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   - Following with hem/onc for IgM monoclonal gammopathy. Ratio has remained WNL. Less likely contributing to renal dysfunction.   - Avoid NSAIDs, maintain hydration, recommend further BP control with changes as below     UPCR 930mg; continue max ARB and MRA    Acid-base WNL on NaBicarb, continue    Secondary hyperparathyroidism Ca and phos stable. Monitor PTH.    Anemia Hgb at CKD goal   DM A1C controlled    Lipid Management On statin   ESRD planning Provided education about the stages of CKD, usual progression, and need to monitor for and treat CKD-related disease in an effort to delay progression of CKD.        HTN - Elevated. Above goal of < 130/80.   - Discussed low sodium diet (reported eating high sodium foods).  - Continue losartan 100, HCTZ 12.5, nifedipine 90, spironolactone 25  - He is unsure of his medications so requested he bring all medications to next appt for review. Will adjust antihypertensives accordingly.     Gout - No recent flares. Maintained on allopurinol 100mg qd. Continue. Monitor uric acid.      MGUS - defer to hem/onc. Appears stable.     All questions patient had were answered.  Asked if further questions. None. F/u in clinic 2 weeks with medications.  ER for emergency concerns.    Summary of Plan:  - RTC 2 weeks with medications  - Plan for antihypertensive adjustment then

## 2024-03-04 ENCOUNTER — PATIENT MESSAGE (OUTPATIENT)
Dept: ADMINISTRATIVE | Facility: HOSPITAL | Age: 84
End: 2024-03-04
Payer: MEDICARE

## 2024-03-06 ENCOUNTER — OFFICE VISIT (OUTPATIENT)
Dept: NEPHROLOGY | Facility: CLINIC | Age: 84
End: 2024-03-06
Payer: MEDICARE

## 2024-03-06 VITALS
WEIGHT: 237.44 LBS | SYSTOLIC BLOOD PRESSURE: 162 MMHG | BODY MASS INDEX: 30.47 KG/M2 | OXYGEN SATURATION: 96 % | HEART RATE: 97 BPM | DIASTOLIC BLOOD PRESSURE: 60 MMHG | HEIGHT: 74 IN

## 2024-03-06 DIAGNOSIS — N18.32 STAGE 3B CHRONIC KIDNEY DISEASE: Primary | ICD-10-CM

## 2024-03-06 DIAGNOSIS — R80.9 PROTEINURIA, UNSPECIFIED TYPE: ICD-10-CM

## 2024-03-06 DIAGNOSIS — N18.32 ANEMIA IN STAGE 3B CHRONIC KIDNEY DISEASE: ICD-10-CM

## 2024-03-06 DIAGNOSIS — D63.1 ANEMIA IN STAGE 3B CHRONIC KIDNEY DISEASE: ICD-10-CM

## 2024-03-06 DIAGNOSIS — I10 HTN (HYPERTENSION), BENIGN: ICD-10-CM

## 2024-03-06 DIAGNOSIS — I12.9 TYPE 2 DM WITH CKD STAGE 3 AND HYPERTENSION: ICD-10-CM

## 2024-03-06 DIAGNOSIS — D47.2 MGUS (MONOCLONAL GAMMOPATHY OF UNKNOWN SIGNIFICANCE): ICD-10-CM

## 2024-03-06 DIAGNOSIS — E11.22 TYPE 2 DM WITH CKD STAGE 3 AND HYPERTENSION: ICD-10-CM

## 2024-03-06 DIAGNOSIS — N18.30 TYPE 2 DM WITH CKD STAGE 3 AND HYPERTENSION: ICD-10-CM

## 2024-03-06 DIAGNOSIS — M10.00 IDIOPATHIC GOUT, UNSPECIFIED CHRONICITY, UNSPECIFIED SITE: ICD-10-CM

## 2024-03-06 PROCEDURE — 99999 PR PBB SHADOW E&M-EST. PATIENT-LVL III: CPT | Mod: PBBFAC,,, | Performed by: NURSE PRACTITIONER

## 2024-03-06 PROCEDURE — 99214 OFFICE O/P EST MOD 30 MIN: CPT | Mod: S$GLB,,, | Performed by: NURSE PRACTITIONER

## 2024-03-06 RX ORDER — SPIRONOLACTONE 50 MG/1
50 TABLET, FILM COATED ORAL DAILY
Qty: 30 TABLET | Refills: 11 | Status: SHIPPED | OUTPATIENT
Start: 2024-03-06 | End: 2025-03-06

## 2024-03-06 NOTE — PROGRESS NOTES
Subjective:       Patient ID: Tip Hurst is a 83 y.o. Black or  male who presents for re-evaluation of CKD 3.      HPI     Patient is new to me. Last seen by Dr. Morales in July 2020.  Prior pertinent chart reviewed since this is patient's first appointment with me.    Patient presents for re-evaluation of CKD 3.  Baseline creatinine of 1.5-1.8 since 2018.    Significant other medical problems include T2DM (controlled), HTN, MGUS, BPH, HLD, gout, h/o prostate cancer, atherosclerosis of aorta.      The patient denies taking NSAIDs, herbal supplements, or new antibiotics, recreational drugs, recent episode of dehydration, diarrhea, nausea or vomiting, acute illness, hospitalization or exposure to IV radiocontrast. Denies recent gout flares. Has chronic swelling of RLE. Reports compliance with antihypertensives. SBP ranges 160-170s.     Significant family hx includes: No reported history of renal disease    Last renal US: 8/22/23, reviewed.  FINDINGS:  Right kidney: The right kidney measures 10.3 cm. There is cortical thinning. No loss of corticomedullary distinction. Resistive index measures 0.92.  No mass. No renal stone. No hydronephrosis.     Left kidney: The left kidney measures 10.7 cm. There is cortical thinning. No loss of corticomedullary distinction. Resistive index measures 0.86.  No mass. No renal stone. No hydronephrosis.     The bladder is partially distended at the time of scanning and has an unremarkable appearance.     Limited evaluation of the liver suggests steatosis.     Impression:     Medical-renal disease.     Limited evaluation of the liver in the right upper quadrant suggests hepatic steatosis.    Update 2/26/24:  - Presents for blood pressure check, medication evaluation and CKD  - sCr within baseline at 1.8  - BP at home ranging 147//67  - BP above goal today. He has not yet taken his medications.   - Remains elevated at appointments even after medications  "      Review of Systems   Respiratory:  Negative for shortness of breath.    Cardiovascular:  Negative for chest pain and leg swelling.   Gastrointestinal:  Negative for diarrhea.   Genitourinary:  Negative for difficulty urinating.   Neurological:  Negative for headaches.   All other systems reviewed and are negative.      Objective:       Blood pressure (!) 162/60, pulse 97, height 6' 2" (1.88 m), weight 107.7 kg (237 lb 7 oz), SpO2 96 %.   Physical Exam  Vitals reviewed.   Constitutional:       General: He is not in acute distress.     Appearance: Normal appearance.   HENT:      Head: Normocephalic and atraumatic.   Eyes:      General: No scleral icterus.     Conjunctiva/sclera: Conjunctivae normal.   Cardiovascular:      Rate and Rhythm: Normal rate.   Pulmonary:      Effort: Pulmonary effort is normal. No respiratory distress.   Skin:     General: Skin is warm and dry.   Neurological:      Mental Status: He is alert and oriented to person, place, and time.   Psychiatric:         Mood and Affect: Mood normal.         Behavior: Behavior normal.           Lab Results   Component Value Date    CREATININE 1.8 (H) 02/26/2024    URICACID 8.3 (H) 07/26/2023     Prot/Creat Ratio, Urine   Date Value Ref Range Status   02/26/2024 0.93 (H) 0.00 - 0.20 Final   09/16/2023 0.95 (H) 0.00 - 0.20 Final     Lab Results   Component Value Date     02/26/2024    K 4.7 02/26/2024    CO2 23 02/26/2024     02/26/2024     Lab Results   Component Value Date    CALCIUM 9.6 02/26/2024    PHOS 3.4 02/26/2024     Lab Results   Component Value Date    HGB 10.3 (L) 02/26/2024    WBC 7.93 02/26/2024    HCT 32.8 (L) 02/26/2024      Lab Results   Component Value Date    HGBA1C 5.5 07/26/2023     02/26/2024    BUN 27 (H) 02/26/2024     Lab Results   Component Value Date    LDLCALC 97.0 09/15/2023         Assessment:       1. Stage 3b chronic kidney disease    2. Proteinuria, unspecified type    3. Anemia in stage 3b chronic " kidney disease    4. Type 2 DM with CKD stage 3 and hypertension    5. Idiopathic gout, unspecified chronicity, unspecified site    6. MGUS (monoclonal gammopathy of unknown significance)    7. HTN (hypertension), benign            Plan:   CKD stage 3b  -  - Baseline sCr ~1.5-1.8 since 2018. 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   - Following with hem/onc for IgM monoclonal gammopathy. Ratio has remained WNL. Less likely contributing to renal dysfunction.   - Avoid NSAIDs, maintain hydration, recommend further BP control with changes as below     UPCR 930mg; continue max ARB and MRA    Acid-base Bicarb 15--> 23, continue sodium bicarb BID   Secondary hyperparathyroidism Ca and phos stable. Monitor PTH and Vit D.   Anemia Hgb at CKD goal   DM A1C controlled    Lipid Management On statin   ESRD planning Provided education about the stages of CKD, usual progression, and need to monitor for and treat CKD-related disease in an effort to delay progression of CKD.        HTN - Elevated. Above goal of < 130/80.   - Discussed low sodium diet (eating high sodium foods).  - Continue losartan 100, HCTZ 12.5, nifedipine 90  - Increase spironolactone to 50mg qd  - Repeat BMP in 2 weeks    Gout - No recent flares. Maintained on allopurinol 100mg qd. Continue. Monitor uric acid.      MGUS - defer to hem/onc. Appears stable.     All questions patient had were answered.  Asked if further questions. None. F/u in clinic 3-4 months  with labs and urine prior to next visit or sooner if needed.  ER for emergency concerns.    Summary of Plan:  - Increase spironolactone to 50mg qd  - Monitor blood pressure at home and keep log  - Repeat BMP in 2 weeks  - RTC 4 months

## 2024-03-06 NOTE — PATIENT INSTRUCTIONS
Increase spironolactone to 50 mg (which is 2 tablets of your current bottle). Refill sent for 50mg tablets today.    Repeat labs in 2 weeks.

## 2024-03-21 ENCOUNTER — LAB VISIT (OUTPATIENT)
Dept: LAB | Facility: HOSPITAL | Age: 84
End: 2024-03-21
Payer: MEDICARE

## 2024-03-21 DIAGNOSIS — N18.32 STAGE 3B CHRONIC KIDNEY DISEASE: ICD-10-CM

## 2024-03-21 LAB
ANION GAP SERPL CALC-SCNC: 10 MMOL/L (ref 8–16)
BUN SERPL-MCNC: 26 MG/DL (ref 8–23)
CALCIUM SERPL-MCNC: 10.1 MG/DL (ref 8.7–10.5)
CHLORIDE SERPL-SCNC: 108 MMOL/L (ref 95–110)
CO2 SERPL-SCNC: 20 MMOL/L (ref 23–29)
CREAT SERPL-MCNC: 1.9 MG/DL (ref 0.5–1.4)
EST. GFR  (NO RACE VARIABLE): 34.1 ML/MIN/1.73 M^2
GLUCOSE SERPL-MCNC: 127 MG/DL (ref 70–110)
POTASSIUM SERPL-SCNC: 5 MMOL/L (ref 3.5–5.1)
SODIUM SERPL-SCNC: 138 MMOL/L (ref 136–145)

## 2024-03-21 PROCEDURE — 80048 BASIC METABOLIC PNL TOTAL CA: CPT | Performed by: NURSE PRACTITIONER

## 2024-03-21 PROCEDURE — 36415 COLL VENOUS BLD VENIPUNCTURE: CPT | Mod: PO | Performed by: NURSE PRACTITIONER

## 2024-03-27 DIAGNOSIS — I10 HTN (HYPERTENSION), BENIGN: ICD-10-CM

## 2024-03-27 RX ORDER — LOSARTAN POTASSIUM 100 MG/1
100 TABLET ORAL
Qty: 90 TABLET | Refills: 1 | Status: SHIPPED | OUTPATIENT
Start: 2024-03-27

## 2024-03-27 NOTE — TELEPHONE ENCOUNTER
No care due was identified.  [facility] Embedded Care Due Messages. Reference number: [id number].   3/27/2024 12:26:04 AM CDT

## 2024-03-27 NOTE — TELEPHONE ENCOUNTER
Refill Routing Note   Medication(s) are not appropriate for processing by [facility] for the following reason(s):        Required vitals abnormal  Required labs abnormal    [facility] action(s):  Defer             Appointments  past 12m or future 3m with PCP    Date Provider   Last Visit   8/25/2023 Moe Gallardo MD   Next Visit   Visit date not found Moe Gallardo MD   ED visits in past 90 days: 0        Note composed:6:06 AM 03/27/2024

## 2024-04-08 ENCOUNTER — LAB VISIT (OUTPATIENT)
Dept: LAB | Facility: HOSPITAL | Age: 84
End: 2024-04-08
Attending: UROLOGY
Payer: MEDICARE

## 2024-04-08 DIAGNOSIS — C61 PROSTATE CANCER: ICD-10-CM

## 2024-04-08 LAB — COMPLEXED PSA SERPL-MCNC: 0.27 NG/ML (ref 0–4)

## 2024-04-08 PROCEDURE — 36415 COLL VENOUS BLD VENIPUNCTURE: CPT | Mod: PO | Performed by: UROLOGY

## 2024-04-08 PROCEDURE — 84153 ASSAY OF PSA TOTAL: CPT | Performed by: UROLOGY

## 2024-04-11 ENCOUNTER — OFFICE VISIT (OUTPATIENT)
Dept: UROLOGY | Facility: CLINIC | Age: 84
End: 2024-04-11
Attending: UROLOGY
Payer: MEDICARE

## 2024-04-11 VITALS
HEIGHT: 74 IN | RESPIRATION RATE: 12 BRPM | HEART RATE: 86 BPM | OXYGEN SATURATION: 100 % | BODY MASS INDEX: 29.42 KG/M2 | WEIGHT: 229.25 LBS | SYSTOLIC BLOOD PRESSURE: 181 MMHG | DIASTOLIC BLOOD PRESSURE: 77 MMHG

## 2024-04-11 DIAGNOSIS — C61 PROSTATE CANCER: Primary | ICD-10-CM

## 2024-04-11 PROCEDURE — 1101F PT FALLS ASSESS-DOCD LE1/YR: CPT | Mod: CPTII,S$GLB,, | Performed by: UROLOGY

## 2024-04-11 PROCEDURE — 99214 OFFICE O/P EST MOD 30 MIN: CPT | Mod: S$GLB,,, | Performed by: UROLOGY

## 2024-04-11 PROCEDURE — 1159F MED LIST DOCD IN RCRD: CPT | Mod: CPTII,S$GLB,, | Performed by: UROLOGY

## 2024-04-11 PROCEDURE — 3288F FALL RISK ASSESSMENT DOCD: CPT | Mod: CPTII,S$GLB,, | Performed by: UROLOGY

## 2024-04-11 PROCEDURE — 1160F RVW MEDS BY RX/DR IN RCRD: CPT | Mod: CPTII,S$GLB,, | Performed by: UROLOGY

## 2024-04-11 PROCEDURE — 3078F DIAST BP <80 MM HG: CPT | Mod: CPTII,S$GLB,, | Performed by: UROLOGY

## 2024-04-11 PROCEDURE — 1126F AMNT PAIN NOTED NONE PRSNT: CPT | Mod: CPTII,S$GLB,, | Performed by: UROLOGY

## 2024-04-11 PROCEDURE — 3077F SYST BP >= 140 MM HG: CPT | Mod: CPTII,S$GLB,, | Performed by: UROLOGY

## 2024-04-11 NOTE — PROGRESS NOTES
"Subjective:      Tip Hurst is a 83 y.o. male who returns today for a f/u    He has history of prostate cancer s/p prostatectomy in 2006. No known recurrence. No adjuvant treatments reported. Path unknown.  He has had biochemical recurrence per PSA testing beginning in 2010 with a very slow rise in PSA in the interim. Recently it has actually decreased.    In 2018 he had incidental finding of a possible bladder lesion on ultrasound.  Cystoscopy was normal at that time.    Today he has no c/o. Here to review recent PSA.    The following portions of the patient's history were reviewed and updated as appropriate: allergies, current medications, past family history, past medical history, past social history, past surgical history and problem list.    Review of Systems  A comprehensive multipoint review of systems was negative except as otherwise stated in the HPI.     Objective:   Vitals: BP (!) 181/77 (BP Location: Left arm, Patient Position: Sitting, BP Method: Medium (Automatic))   Pulse 86   Resp 12   Ht 6' 2" (1.88 m)   Wt 104 kg (229 lb 4.5 oz)   SpO2 100%   BMI 29.44 kg/m²     Physical Exam   General: alert and oriented, no acute distress  Respiratory: Symmetric expansion, non-labored breathing  Neuro: no gross deficits  Psych: normal judgment and insight, normal mood/affect and non-anxious    Lab Review     Lab Results   Component Value Date    WBC 7.93 02/26/2024    HGB 10.3 (L) 02/26/2024    HCT 32.8 (L) 02/26/2024    MCV 92 02/26/2024     02/26/2024     Lab Results   Component Value Date    CREATININE 1.9 (H) 03/21/2024    BUN 26 (H) 03/21/2024     Component PSA, SCREEN PSA DIAGNOSTIC   Latest Ref Rng & Units 0.00 - 4.00 ng/mL 0.00 - 4.00 ng/mL   04/08/2024  0.27   10/10/2023  0.58   4/6/2023  1.6   9/26/2022  1.1   3/18/2020  0.39   1/16/2020  0.41   9/13/2019  0.35   3/14/2019  0.31   8/21/2018  0.25   9/5/2013 0.26    7/23/2013 0.25    8/21/2012 0.18    5/4/2011 0.03    9/21/2010 0.02  "   9/25/2009 <0.01    8/6/2008 <0.01    8/9/2007 <0.1    7/13/2006 6.6 (H)    4/19/2005 4.2 (H)        Assessment and Plan:   1. Prostate cancer  -- Biochemical recurrence with very slow rise in PSA over the last 12 years.  -- Previously decision made to not pursue additional treatment such as ADT at this time (or hopefully ever)  -- PSA has spontaneously decreased in past 12 months  -- PSA in 6 mos

## 2024-04-18 DIAGNOSIS — M10.071 IDIOPATHIC GOUT OF RIGHT FOOT, UNSPECIFIED CHRONICITY: ICD-10-CM

## 2024-04-18 NOTE — TELEPHONE ENCOUNTER
No care due was identified.  [facility] Embedded Care Due Messages. Reference number: [id number].   4/18/2024 12:05:06 AM CDT

## 2024-04-19 RX ORDER — ALLOPURINOL 100 MG/1
100 TABLET ORAL
Qty: 90 TABLET | Refills: 1 | Status: SHIPPED | OUTPATIENT
Start: 2024-04-19

## 2024-04-19 NOTE — TELEPHONE ENCOUNTER
Refill Routing Note   Medication(s) are not appropriate for processing by [facility] for the following reason(s):     [facility] not previously overridden by current provider--after initial override, the [facility] Center will be able to continue overrides      Required labs abnormal    [facility] action(s):  Defer             Appointments  past 12m or future 3m with PCP    Date Provider   Last Visit   8/25/2023 Moe Gallardo MD   Next Visit   Visit date not found Moe Gallardo MD   ED visits in past 90 days: 0        Note composed:11:12 PM 04/18/2024

## 2024-04-19 NOTE — TELEPHONE ENCOUNTER
Tip Hurst  is requesting a refill authorization.  Brief Assessment and Rationale for Refill:  Approve     Medication Therapy Plan:  renal fxn reviewed, dose ok      Extended chart review required: Yes   Comments:     Note composed:8:33 AM 04/19/2024

## 2024-04-25 ENCOUNTER — PATIENT MESSAGE (OUTPATIENT)
Dept: ADMINISTRATIVE | Facility: HOSPITAL | Age: 84
End: 2024-04-25
Payer: MEDICARE

## 2024-05-24 DIAGNOSIS — I10 HTN (HYPERTENSION), BENIGN: ICD-10-CM

## 2024-05-24 RX ORDER — NIFEDIPINE 90 MG/1
90 TABLET, EXTENDED RELEASE ORAL DAILY
Qty: 90 TABLET | Refills: 0 | Status: SHIPPED | OUTPATIENT
Start: 2024-05-24

## 2024-05-24 NOTE — TELEPHONE ENCOUNTER
Approved Prescriptions     NIFEdipine (PROCARDIA-XL) 90 MG ([facility]) 24 hr tablet         Sig: Take 1 tablet (90 mg total) by mouth once daily.    Original sig: TAKE 1 TABLET BY MOUTH ONCE DAILY.    Disp: 90 tablet    Refills: 0 (Pharmacy requested: 2)    Start: 5/24/2024    Class: Normal    Authorized by: Moe Contreras MD    For: HTN (hypertension), benign    To pharmacy: .        To be filled at: [facility]/pharmacy #[id number] [place], [place] - [address]        Requested refill has been sent to pharmacy. If patient is due for visit then please schedule appointment. If not then please contact patient and see if they have home blood pressure readings or the ability to check their blood pressure at home. Please obtain their most recent blood pressure along with an average. If they do not have home BP then please schedule follow up within 7-10 days for nurse visit for BP check. Please make sure patient has all meds prescribed and is taking all medications prior to their nurse visit.     Respectfully,  Moe Contreras    Appointment Information   Name: ESTEFANIA ROBISON MRN: [id number]   Date: 6/11/2024 Status: [facility]   Appt Time: 3:40 PM Length: 40   Visit Type: EP - PRIMARY CARE ([facility]) [486] Copay: $0.00   Provider: Moe Contreras MD Dept: [facility], Internal Medicine       Dept Address: [address] [place] [id number]       Dept Phone Number: [phone]   Referring Provider:   PADMINI: [id number]   Appt Phone:     Notes: HTN   Made On: 5/24/2024 5:11 PM By: KARAN PATEL [[id number]] (ASIA)

## 2024-05-24 NOTE — TELEPHONE ENCOUNTER
Refill Routing Note   Medication(s) are not appropriate for processing by [facility] for the following reason(s):        Required vitals abnormal: trending upward      [facility] action(s):  Defer   Requires appointment : Yes     Requires labs : Yes    Medication Therapy Plan:  Due for annual with PCP, labs (CMP, Uric acid,) due 7.20.2024      Appointments  past 12m or future 3m with PCP    Date Provider   Last Visit   8/25/2023 Moe Gallardo MD   Next Visit   Visit date not found Moe Gallardo MD   ED visits in past 90 days: 0        Note composed:3:15 PM 05/24/2024

## 2024-05-24 NOTE — TELEPHONE ENCOUNTER
Care Due:                  Date            Visit Type   Department     Provider  --------------------------------------------------------------------------------                                EP -                              PRIMARY      [facility] INTERNAL  Last Visit: 08-      CARE ([facility])   MEDICINE       Moe Gallardo  Next Visit: None Scheduled  None         None Found                                                            Last  Test          Frequency    Reason                     Performed    Due Date  --------------------------------------------------------------------------------    Office Visit  12 months..  cholecalciferol,.........  08- 08-    CMP.........  12 months..  allopurinoL, simvastatin.  07- 07-    Uric Acid...  12 months..  allopurinoL..............  07- 07-    Vitamin D...  12 months..  cholecalciferol,.........  07- 07-    [facility] Embedded Care Due Messages. Reference number: [id number].   5/24/2024 10:33:00 AM CDT

## 2024-05-28 ENCOUNTER — PATIENT OUTREACH (OUTPATIENT)
Dept: ADMINISTRATIVE | Facility: HOSPITAL | Age: 84
End: 2024-05-28
Payer: MEDICARE

## 2024-05-28 DIAGNOSIS — E11.9 DIABETES MELLITUS WITHOUT COMPLICATION: Primary | ICD-10-CM

## 2024-06-11 ENCOUNTER — OFFICE VISIT (OUTPATIENT)
Dept: INTERNAL MEDICINE | Facility: CLINIC | Age: 84
End: 2024-06-11
Attending: INTERNAL MEDICINE
Payer: MEDICARE

## 2024-06-11 ENCOUNTER — TELEPHONE (OUTPATIENT)
Dept: INTERNAL MEDICINE | Facility: CLINIC | Age: 84
End: 2024-06-11

## 2024-06-11 ENCOUNTER — LAB VISIT (OUTPATIENT)
Dept: LAB | Facility: OTHER | Age: 84
End: 2024-06-11
Attending: INTERNAL MEDICINE
Payer: MEDICARE

## 2024-06-11 VITALS
OXYGEN SATURATION: 97 % | HEART RATE: 86 BPM | BODY MASS INDEX: 28.88 KG/M2 | WEIGHT: 225 LBS | SYSTOLIC BLOOD PRESSURE: 140 MMHG | HEIGHT: 74 IN | DIASTOLIC BLOOD PRESSURE: 52 MMHG

## 2024-06-11 DIAGNOSIS — D47.3 ESSENTIAL (HEMORRHAGIC) THROMBOCYTHEMIA: ICD-10-CM

## 2024-06-11 DIAGNOSIS — N18.31 CHRONIC KIDNEY DISEASE, STAGE 3A: ICD-10-CM

## 2024-06-11 DIAGNOSIS — E11.59 TYPE 2 DIABETES MELLITUS WITH OTHER CIRCULATORY COMPLICATIONS: ICD-10-CM

## 2024-06-11 DIAGNOSIS — E03.9 HYPOTHYROIDISM, UNSPECIFIED TYPE: ICD-10-CM

## 2024-06-11 DIAGNOSIS — M17.12 PRIMARY OSTEOARTHRITIS OF LEFT KNEE: ICD-10-CM

## 2024-06-11 DIAGNOSIS — D89.2 PARAPROTEINEMIA: ICD-10-CM

## 2024-06-11 DIAGNOSIS — I10 HTN (HYPERTENSION), BENIGN: ICD-10-CM

## 2024-06-11 DIAGNOSIS — R06.09 DOE (DYSPNEA ON EXERTION): ICD-10-CM

## 2024-06-11 DIAGNOSIS — M10.9 GOUT, ARTHRITIS: ICD-10-CM

## 2024-06-11 DIAGNOSIS — I70.0 ATHEROSCLEROSIS OF AORTA: ICD-10-CM

## 2024-06-11 DIAGNOSIS — R74.8 ELEVATED ALKALINE PHOSPHATASE LEVEL: ICD-10-CM

## 2024-06-11 DIAGNOSIS — C61 PROSTATE CANCER: ICD-10-CM

## 2024-06-11 DIAGNOSIS — N25.81 SECONDARY HYPERPARATHYROIDISM OF RENAL ORIGIN: ICD-10-CM

## 2024-06-11 DIAGNOSIS — D89.2 PARAPROTEINEMIA: Primary | ICD-10-CM

## 2024-06-11 DIAGNOSIS — R01.1 HEART MURMUR: ICD-10-CM

## 2024-06-11 DIAGNOSIS — M10.071 IDIOPATHIC GOUT OF RIGHT FOOT, UNSPECIFIED CHRONICITY: ICD-10-CM

## 2024-06-11 LAB
ALBUMIN SERPL BCP-MCNC: 4 G/DL (ref 3.5–5.2)
ALP SERPL-CCNC: 125 U/L (ref 55–135)
ALT SERPL W/O P-5'-P-CCNC: 13 U/L (ref 10–44)
ANION GAP SERPL CALC-SCNC: 11 MMOL/L (ref 8–16)
AST SERPL-CCNC: 14 U/L (ref 10–40)
BASOPHILS # BLD AUTO: 0.08 K/UL (ref 0–0.2)
BASOPHILS NFR BLD: 0.7 % (ref 0–1.9)
BILIRUB SERPL-MCNC: 0.5 MG/DL (ref 0.1–1)
BUN SERPL-MCNC: 35 MG/DL (ref 8–23)
CALCIUM SERPL-MCNC: 10 MG/DL (ref 8.7–10.5)
CHLORIDE SERPL-SCNC: 108 MMOL/L (ref 95–110)
CHOLEST SERPL-MCNC: 141 MG/DL (ref 120–199)
CHOLEST/HDLC SERPL: 4.9 {RATIO} (ref 2–5)
CO2 SERPL-SCNC: 19 MMOL/L (ref 23–29)
CREAT SERPL-MCNC: 2.7 MG/DL (ref 0.5–1.4)
DIFFERENTIAL METHOD BLD: ABNORMAL
EOSINOPHIL # BLD AUTO: 0.4 K/UL (ref 0–0.5)
EOSINOPHIL NFR BLD: 3.8 % (ref 0–8)
ERYTHROCYTE [DISTWIDTH] IN BLOOD BY AUTOMATED COUNT: 13.2 % (ref 11.5–14.5)
EST. GFR  (NO RACE VARIABLE): 22 ML/MIN/1.73 M^2
ESTIMATED AVG GLUCOSE: 111 MG/DL (ref 68–131)
GLUCOSE SERPL-MCNC: 113 MG/DL (ref 70–110)
HBA1C MFR BLD: 5.5 % (ref 4–5.6)
HCT VFR BLD AUTO: 32 % (ref 40–54)
HDLC SERPL-MCNC: 29 MG/DL (ref 40–75)
HDLC SERPL: 20.6 % (ref 20–50)
HGB BLD-MCNC: 10.2 G/DL (ref 14–18)
IGA SERPL-MCNC: 244 MG/DL (ref 40–350)
IGG SERPL-MCNC: 1044 MG/DL (ref 650–1600)
IGM SERPL-MCNC: 1294 MG/DL (ref 50–300)
IMM GRANULOCYTES # BLD AUTO: 0.04 K/UL (ref 0–0.04)
IMM GRANULOCYTES NFR BLD AUTO: 0.4 % (ref 0–0.5)
LDLC SERPL CALC-MCNC: 77.4 MG/DL (ref 63–159)
LYMPHOCYTES # BLD AUTO: 3.7 K/UL (ref 1–4.8)
LYMPHOCYTES NFR BLD: 32.9 % (ref 18–48)
MCH RBC QN AUTO: 27.9 PG (ref 27–31)
MCHC RBC AUTO-ENTMCNC: 31.9 G/DL (ref 32–36)
MCV RBC AUTO: 88 FL (ref 82–98)
MONOCYTES # BLD AUTO: 0.9 K/UL (ref 0.3–1)
MONOCYTES NFR BLD: 8 % (ref 4–15)
NEUTROPHILS # BLD AUTO: 6.1 K/UL (ref 1.8–7.7)
NEUTROPHILS NFR BLD: 54.2 % (ref 38–73)
NONHDLC SERPL-MCNC: 112 MG/DL
NRBC BLD-RTO: 0 /100 WBC
PLATELET # BLD AUTO: 371 K/UL (ref 150–450)
PMV BLD AUTO: 9.3 FL (ref 9.2–12.9)
POTASSIUM SERPL-SCNC: 5.1 MMOL/L (ref 3.5–5.1)
PROT SERPL-MCNC: 8.9 G/DL (ref 6–8.4)
RBC # BLD AUTO: 3.65 M/UL (ref 4.6–6.2)
SODIUM SERPL-SCNC: 138 MMOL/L (ref 136–145)
TRIGL SERPL-MCNC: 173 MG/DL (ref 30–150)
TSH SERPL DL<=0.005 MIU/L-ACNC: 3.98 UIU/ML (ref 0.4–4)
WBC # BLD AUTO: 11.2 K/UL (ref 3.9–12.7)

## 2024-06-11 PROCEDURE — 99999 PR PBB SHADOW E&M-EST. PATIENT-LVL IV: CPT | Mod: PBBFAC,,, | Performed by: INTERNAL MEDICINE

## 2024-06-11 PROCEDURE — 99214 OFFICE O/P EST MOD 30 MIN: CPT | Mod: S$GLB,,, | Performed by: INTERNAL MEDICINE

## 2024-06-11 PROCEDURE — G2211 COMPLEX E/M VISIT ADD ON: HCPCS | Mod: S$GLB,,, | Performed by: INTERNAL MEDICINE

## 2024-06-11 PROCEDURE — 3077F SYST BP >= 140 MM HG: CPT | Mod: CPTII,S$GLB,, | Performed by: INTERNAL MEDICINE

## 2024-06-11 PROCEDURE — 3288F FALL RISK ASSESSMENT DOCD: CPT | Mod: CPTII,S$GLB,, | Performed by: INTERNAL MEDICINE

## 2024-06-11 PROCEDURE — 83036 HEMOGLOBIN GLYCOSYLATED A1C: CPT | Performed by: INTERNAL MEDICINE

## 2024-06-11 PROCEDURE — 80061 LIPID PANEL: CPT | Performed by: INTERNAL MEDICINE

## 2024-06-11 PROCEDURE — 82784 ASSAY IGA/IGD/IGG/IGM EACH: CPT | Mod: 59 | Performed by: INTERNAL MEDICINE

## 2024-06-11 PROCEDURE — 36415 COLL VENOUS BLD VENIPUNCTURE: CPT | Performed by: INTERNAL MEDICINE

## 2024-06-11 PROCEDURE — 1101F PT FALLS ASSESS-DOCD LE1/YR: CPT | Mod: CPTII,S$GLB,, | Performed by: INTERNAL MEDICINE

## 2024-06-11 PROCEDURE — 85025 COMPLETE CBC W/AUTO DIFF WBC: CPT | Performed by: INTERNAL MEDICINE

## 2024-06-11 PROCEDURE — 83521 IG LIGHT CHAINS FREE EACH: CPT | Mod: 59 | Performed by: INTERNAL MEDICINE

## 2024-06-11 PROCEDURE — 1160F RVW MEDS BY RX/DR IN RCRD: CPT | Mod: CPTII,S$GLB,, | Performed by: INTERNAL MEDICINE

## 2024-06-11 PROCEDURE — 84165 PROTEIN E-PHORESIS SERUM: CPT | Performed by: INTERNAL MEDICINE

## 2024-06-11 PROCEDURE — 1159F MED LIST DOCD IN RCRD: CPT | Mod: CPTII,S$GLB,, | Performed by: INTERNAL MEDICINE

## 2024-06-11 PROCEDURE — 84165 PROTEIN E-PHORESIS SERUM: CPT | Mod: 26,,, | Performed by: PATHOLOGY

## 2024-06-11 PROCEDURE — 80053 COMPREHEN METABOLIC PANEL: CPT | Performed by: INTERNAL MEDICINE

## 2024-06-11 PROCEDURE — 3078F DIAST BP <80 MM HG: CPT | Mod: CPTII,S$GLB,, | Performed by: INTERNAL MEDICINE

## 2024-06-11 PROCEDURE — 84443 ASSAY THYROID STIM HORMONE: CPT | Performed by: INTERNAL MEDICINE

## 2024-06-11 RX ORDER — HYDROCHLOROTHIAZIDE 25 MG/1
25 TABLET ORAL DAILY
Qty: 90 TABLET | Refills: 2 | Status: SHIPPED | OUTPATIENT
Start: 2024-06-11

## 2024-06-11 RX ORDER — DICLOFENAC SODIUM 10 MG/G
2 GEL TOPICAL 4 TIMES DAILY
Qty: 100 G | Refills: 11 | Status: SHIPPED | OUTPATIENT
Start: 2024-06-11

## 2024-06-12 LAB
ALBUMIN SERPL ELPH-MCNC: 4.17 G/DL (ref 3.35–5.55)
ALPHA1 GLOB SERPL ELPH-MCNC: 0.33 G/DL (ref 0.17–0.41)
ALPHA2 GLOB SERPL ELPH-MCNC: 1.1 G/DL (ref 0.43–0.99)
B-GLOBULIN SERPL ELPH-MCNC: 0.92 G/DL (ref 0.5–1.1)
GAMMA GLOB SERPL ELPH-MCNC: 1.78 G/DL (ref 0.67–1.58)
KAPPA LC SER QL IA: 5.5 MG/DL (ref 0.33–1.94)
KAPPA LC/LAMBDA SER IA: 0.21 (ref 0.26–1.65)
LAMBDA LC SER QL IA: 26.41 MG/DL (ref 0.57–2.63)
PROT SERPL-MCNC: 8.3 G/DL (ref 6–8.4)

## 2024-06-14 LAB — PATHOLOGIST INTERPRETATION SPE: NORMAL

## 2024-06-25 ENCOUNTER — HOSPITAL ENCOUNTER (OUTPATIENT)
Dept: CARDIOLOGY | Facility: HOSPITAL | Age: 84
Discharge: HOME | End: 2024-06-25
Attending: INTERNAL MEDICINE
Payer: MEDICARE

## 2024-06-25 VITALS — BODY MASS INDEX: 28.88 KG/M2 | WEIGHT: 225 LBS | HEIGHT: 74 IN

## 2024-06-25 DIAGNOSIS — R06.09 DOE (DYSPNEA ON EXERTION): ICD-10-CM

## 2024-06-25 LAB
AV MEAN GRADIENT: 28 MMHG
AV PEAK GRADIENT: 46 MMHG
AV VALVE AREA BY VELOCITY RATIO: 1.01 CM²
AV VELOCITY RATIO: 0.26
BSA FOR ECHO PROCEDURE: 2.31 M2
DOP CALC AO PEAK VEL: 3.4 M/S
DOP CALC LVOT AREA: 3.8 CM2
DOP CALC LVOT DIAMETER: 2.2 CM
DOP CALC LVOT PEAK VEL: 0.9 M/S
DOP CALC LVOT STROKE VOLUME: 80.93 CM3
DOP CALCLVOT PEAK VEL VTI: 21.3 CM
LA MAJOR: 4.3 CM
LA WIDTH: 4.6 CM
RA MAJOR: 4.5 CM
RA PRESSURE ESTIMATED: 3 MMHG
RA WIDTH: 3.2 CM
RIGHT VENTRICLE DIASTOLIC BASEL DIMENSION: 3.2 CM
SINUS: 3.6 CM
STJ: 3.3 CM

## 2024-06-25 PROCEDURE — 93306 TTE W/DOPPLER COMPLETE: CPT

## 2024-06-25 PROCEDURE — 93306 TTE W/DOPPLER COMPLETE: CPT | Mod: 26,,, | Performed by: INTERNAL MEDICINE

## 2024-06-26 ENCOUNTER — TELEPHONE (OUTPATIENT)
Dept: INTERNAL MEDICINE | Facility: CLINIC | Age: 84
End: 2024-06-26
Payer: MEDICARE

## 2024-06-26 ENCOUNTER — TELEPHONE (OUTPATIENT)
Dept: HEMATOLOGY/ONCOLOGY | Facility: CLINIC | Age: 84
End: 2024-06-26
Payer: MEDICARE

## 2024-06-26 DIAGNOSIS — M25.562 PAIN AND SWELLING OF LEFT KNEE: Primary | ICD-10-CM

## 2024-06-26 DIAGNOSIS — M25.462 PAIN AND SWELLING OF LEFT KNEE: Primary | ICD-10-CM

## 2024-06-26 NOTE — TELEPHONE ENCOUNTER
----- Message from Moe Gallardo MD sent at 6/26/2024  7:26 AM CDT -----  Please let pt know his kidney function has decreased a bunch. How much fluids does he consume on a daily basis and make sure avoiding NSAIDS. In addition to following up with nephrology in 2 weeks please f/u with hematology. Nephrology is top priority right now

## 2024-06-26 NOTE — TELEPHONE ENCOUNTER
LM for the patient to call the office. Spoke to patient's wife, she asked that I call his daughter,Cely. LM for her to call the office as well.     Need to let the patient know/find out the following:  Per Dr Gallardo:  Please let pt know his kidney function has decreased a bunch. How much fluids does he consume on a daily basis and make sure avoiding NSAIDS. In addition to following up with nephrology in 2 weeks please f/u with hematology.     Nephrology is top priority right now, he is scheduled on 7/10.     How much fluid do you consume on a daily basis? 4 bottles of water (16 oz each)    Avoid NSAIDS such as:  aspirin (such as Disprin)  ibuprofen (such as Nurofen)  naproxen (such as Naprosyn)  diclofenac (such as Voltaren)  celecoxib (such as Celebrex)    Hematology [phone]    Cely/Solomon (daughter and son-in-law)  [phone]  WENCESLAO for her to call the office.

## 2024-06-26 NOTE — TELEPHONE ENCOUNTER
"----- Message from Moe Gallardo MD sent at 6/26/2024 12:17 PM CDT -----  Regarding: RE: Patient Advice  OK to draft letter. thanks  ----- Message -----  From: Shahnaz Yates MA  Sent: 6/26/2024   9:02 AM CDT  To: Moe Gallardo MD  Subject: FW: Patient Advice                               Pls advise if ok to draft letter  Thanks!  ----- Message -----  From: Diana Acevedo  Sent: 6/26/2024   8:58 AM CDT  To: [facility] Staff  Subject: Patient Advice                                                 Name of Who is Calling: Patient's Wife (Sandra Hurst)    Who Left The Message: Patient's Wife (Sandra Hurst)      What is the request in detail:        Patient's wife called requesting a letter excusing her  from Jury Duty. Patient's wife states, "due to his age and health condition Tip isn't capable of being on any jury."  Please give a call back at your earliest convenience and further advise.    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

## 2024-06-26 NOTE — TELEPHONE ENCOUNTER
Spoke to patient and his daughter Cely.     Gave results, patient understood and verbalized.     He drinks 4-5 16oz bottles of water per day. He does not take any NSAIDS, his daughter will make sure he keeps his appointment with Nephrology, she is also calling to schedule his f/u with Hematology.     While talking to Mr Hurst, he expressed that he is having pain in the left knee- possible fluid, may need to be drained, does not recall any injury. He said it has been drained in the past, and he has also gotten injections. Pended referral for Ortho.

## 2024-06-26 NOTE — TELEPHONE ENCOUNTER
----- Message from Phyllis Velázquez sent at 6/26/2024  4:49 PM CDT -----  Regarding: Referral Check      Name Of Caller:    Mark Ritchie (Pt's Son-In-Law)      Contact Preference:   [phone]      Nature of call:   Calling to verify whether the office received the pt's referral. It was sent from Dr. Moe Gallardo.

## 2024-06-27 ENCOUNTER — TELEPHONE (OUTPATIENT)
Dept: ORTHOPEDICS | Facility: CLINIC | Age: 84
End: 2024-06-27
Payer: MEDICARE

## 2024-06-27 NOTE — TELEPHONE ENCOUNTER
----- Message from Phyllis Canas RN sent at 6/26/2024  5:00 PM CDT -----  Regarding: FW: Referral Check  Dr Gallardo,    I believe the pt is inquiring about a hematology referral.    If this is the case,  Then can you place one so he can be scheduled?      Linda Canas RN  Nurse navigator  ----- Message -----  From: Phyllis Velázquez  Sent: 6/26/2024   4:52 PM CDT  To: [facility] Cancer Navigation  Subject: Referral Check                                       Name Of Caller:    Mark Ritchie (Pt's Son-In-Law)      Contact Preference:   [phone]      Nature of call:   Calling to verify whether the office received the pt's referral. It was sent from Dr. Moe Gallardo.

## 2024-06-28 ENCOUNTER — TELEPHONE (OUTPATIENT)
Dept: INTERNAL MEDICINE | Facility: CLINIC | Age: 84
End: 2024-06-28
Payer: MEDICARE

## 2024-06-28 DIAGNOSIS — M25.569 KNEE PAIN, UNSPECIFIED CHRONICITY, UNSPECIFIED LATERALITY: Primary | ICD-10-CM

## 2024-06-28 DIAGNOSIS — N28.9 FUNCTION KIDNEY DECREASED: Primary | ICD-10-CM

## 2024-06-28 NOTE — TELEPHONE ENCOUNTER
Called Pt daughter and she will get back with me after she speak to her  and dad about the reason for Hematology ref. I searched the notes and seen kidney function decreased. I put info on Hemat ref.

## 2024-06-28 NOTE — TELEPHONE ENCOUNTER
----- Message from Phyllis Canas RN sent at 6/28/2024  9:38 AM CDT -----  Regarding: FW: Referral Check  Can you review the question below and check with Dr Gallardo?  If he wants a benign hematology appt for this pt, then a referral needs to be placed.    THank you,    Linda  ----- Message -----  From: Phyllis Canas RN  Sent: 6/26/2024   5:01 PM CDT  To: Moe Gallardo MD  Subject: FW: Referral Check                               Dr Gallardo,    I believe the pt is inquiring about a hematology referral.    If this is the case,  Then can you place one so he can be scheduled?      Linda Canas RN  Nurse navigator  ----- Message -----  From: Phyllis Velázquez  Sent: 6/26/2024   4:52 PM CDT  To: [facility] Cancer Navigation  Subject: Referral Check                                       Name Of Caller:    Mark Ritchie (Pt's Son-In-Law)      Contact Preference:   [phone]      Nature of call:   Calling to verify whether the office received the pt's referral. It was sent from Dr. Moe Gallardo.

## 2024-07-01 ENCOUNTER — TELEPHONE (OUTPATIENT)
Dept: NEPHROLOGY | Facility: CLINIC | Age: 84
End: 2024-07-01
Payer: MEDICARE

## 2024-07-11 ENCOUNTER — OFFICE VISIT (OUTPATIENT)
Dept: ORTHOPEDICS | Facility: CLINIC | Age: 84
End: 2024-07-11
Payer: MEDICARE

## 2024-07-11 VITALS — HEIGHT: 74 IN | WEIGHT: 225.31 LBS | BODY MASS INDEX: 28.92 KG/M2

## 2024-07-11 DIAGNOSIS — M17.12 PRIMARY OSTEOARTHRITIS OF LEFT KNEE: Primary | ICD-10-CM

## 2024-07-11 DIAGNOSIS — M25.562 PAIN AND SWELLING OF LEFT KNEE: ICD-10-CM

## 2024-07-11 DIAGNOSIS — M23.42 LOOSE BODY OF LEFT KNEE: ICD-10-CM

## 2024-07-11 DIAGNOSIS — M25.462 PAIN AND SWELLING OF LEFT KNEE: ICD-10-CM

## 2024-07-11 PROCEDURE — 99999 PR PBB SHADOW E&M-EST. PATIENT-LVL III: CPT | Mod: PBBFAC,,, | Performed by: ORTHOPAEDIC SURGERY

## 2024-07-11 RX ORDER — TRIAMCINOLONE ACETONIDE 40 MG/ML
40 INJECTION, SUSPENSION INTRA-ARTICULAR; INTRAMUSCULAR
Status: DISCONTINUED | OUTPATIENT
Start: 2024-07-11 | End: 2024-07-11 | Stop reason: HOSPADM

## 2024-07-11 RX ADMIN — TRIAMCINOLONE ACETONIDE 40 MG: 40 INJECTION, SUSPENSION INTRA-ARTICULAR; INTRAMUSCULAR at 02:07

## 2024-07-11 NOTE — PROCEDURES
Large Joint Aspiration/Injection: L knee    Date/Time: 7/11/2024 2:20 PM    Performed by: Lloyd Bhagat MD  Authorized by: Lloyd Bhagat MD    Consent Done?:  Yes (Verbal)  Indications:  Arthritis, pain and joint swelling  Prep: patient was prepped and draped in usual sterile fashion      Local anesthesia used?: Yes    Anesthesia:  Local infiltration  Local anesthetic:  Topical anesthetic and lidocaine 1% without epinephrine    Details:  Needle Size:  22 G  Approach:  Anterolateral  Location:  Knee  Site:  L knee  Medications:  40 mg triamcinolone acetonide 40 mg/mL  Patient tolerance:  Patient tolerated the procedure well with no immediate complications

## 2024-07-11 NOTE — PROGRESS NOTES
NEW PATIENT ORTHOPAEDIC: Knee    PRIMARY CARE PHYSICIAN: Moe Gallardo MD   REFERRING PROVIDER: Moe Gallardo MD  [address]  [address]  [place] [address]     ASSESSMENT & PLAN:    Impression:  Left Knee Severe Degenerative Osteoarthritis, Primary   Left Knee Synovial Osteochondroma     Follow Up Plan: PRN    Injection:     Tip Hurst has physical exam evidence of above and wishes to pursue an injection. We discussed alternative non operative modalities including physical therapy, anti-inflammatories, bracing, maintenance of appropriate weight, rest, ambulatory devices. We discussed the risk, benefits, and expectations regarding injection. They have elected to proceed with injection. Should injections fail next step would be gel. See procedure note for billing purposes.     Non operative care:    Tip Hurst has physical exam evidence of above and wishes to pursue an non-operative care. I am recommending the following: injection    Patient comes in with history of left knee pain.  He has only been seen for this knee 1 time before.  That was approximately 20 years ago at which time he received an intra-articular steroid injection.  He was doing well with regard to his knee in its level of function up until about a month ago.  No new trauma or injury.  I have radiographs today that demonstrate severe end-stage arthritis with associated synovial osteochondroma and loose bodies, he has a medial tibial defect secondary to this arthritis.  Obviously with the relief that he received previously from steroid injections I think repeating this treatment modality would be reasonable.  I did advise that I do not suspect that this injection will last as long as the 1st 1.  When he has a return of pain can let me know and we will decide next steps.  If greater than 3 months of relief can continue steroid injections going forward.  If not would try Visco based injections while I do not think that I  will prove long term benefit secondary to his age and overall pain tolerance with regard to his radiographs maybe this would provide some additional relief and help avoid consideration of surgical intervention.    The patient has been ordered:  Office Intraarticular injection    CONSULTS:   None    ACTIVE PROBLEM LIST  Patient Active Problem List   Diagnosis    Hypercholesterolemia    HTN (hypertension), benign    BPH (benign prostatic hypertrophy)    Idiopathic gout of right foot    Prostate cancer    Type 2 diabetes mellitus with other circulatory complications    Secondary hyperparathyroidism of renal origin    Essential (hemorrhagic) thrombocythemia    Atherosclerosis of aorta    Chronic kidney disease, stage 3a    Chronic kidney disease (CKD), stage IV (severe)           SUBJECTIVE    CHIEF COMPLAINT: Knee Pain    HPI:   Tip Hurst is a 83 y.o. male here for evaluation and management of left knee pain. There is not a specific incident that brought about this pain. he has had progressive problems with the knee(s) starting 1 months ago but is now progressing to interfere with activities which include: walking and functional household ADL's    Currently the pain in the joint is rated at moderate with activity. The pain is intermittent and is located in the knee, located medially and located posterior. The pain is described as aching and stiffness. Relieving factors include ambulatory device, rest, and repositioning.     There is associated Clicking and Popping.     Tip Hurst has no additional complaints.     PROGRESSIVE SYMPTOMS:  Pain worsened by weight bearing  Pain effecting living situation    FUNCTIONAL STATUS:   Walk a block or two on level ground     PREVIOUS TREATMENTS:  Medical: Steroid Injections  Physical Therapy: Use of Ambulatory Aid and Activities Modified   Previous Orthopaedic Surgery: None    REVIEW OF SYSTEMS:  PAIN ASSESSMENT:  See HPI.  MUSCULOSKELETAL: See HPI.  OTHER 10 point review  of systems is negative except as stated in HPI above    PAST MEDICAL HISTORY   has a past medical history of Hyperlipidemia and Hypertension.     PAST SURGICAL HISTORY   has a past surgical history that includes Prostate surgery and Leg Surgery.     FAMILY HISTORY  family history includes Aneurysm in his brother; Cancer in his brother; Diabetes in his sister; Heart disease in his sister.     SOCIAL HISTORY   reports that he has never smoked. He has never used smokeless tobacco. He reports that he does not drink alcohol and does not use drugs.     ALLERGIES   Review of patient's allergies indicates:  No Known Allergies     MEDICATIONS  Current Outpatient Medications on File Prior to Visit   Medication Sig Dispense Refill    allopurinoL (ZYLOPRIM) 100 MG tablet TAKE 1 TABLET BY MOUTH EVERY DAY 90 tablet 1    cholecalciferol, vitamin D3, 1,250 mcg (50,000 unit) capsule Take 1 capsule (50,000 Units total) by mouth every 7 days. 12 capsule 3    diclofenac sodium (VOLTAREN) 1 % Gel Apply 2 g topically 4 (four) times daily. 100 g 11    hydroCHLOROthiazide (HYDRODIURIL) 25 MG tablet Take 1 tablet (25 mg total) by mouth once daily. 90 tablet 2    levocetirizine (XYZAL) 5 MG tablet Take 1 tablet (5 mg total) by mouth every evening. 90 tablet 2    losartan (COZAAR) 100 MG tablet TAKE 1 TABLET BY MOUTH EVERY DAY 90 tablet 1    NIFEdipine (PROCARDIA-XL) 90 MG (OSM) 24 hr tablet Take 1 tablet (90 mg total) by mouth once daily. 90 tablet 0    simvastatin (ZOCOR) 40 MG tablet Take 1 tablet (40 mg total) by mouth every evening. 90 tablet 3    sodium bicarbonate 650 MG tablet Take 1 tablet (650 mg total) by mouth 2 (two) times daily. 60 tablet 11    spironolactone (ALDACTONE) 50 MG tablet Take 1 tablet (50 mg total) by mouth once daily. 30 tablet 11     No current facility-administered medications on file prior to visit.          PHYSICAL EXAM   vitals were not taken for this visit.   There is no height or weight on file to calculate  BMI.      All other systems deferred.  GENERAL:  No acute distress  HABITUS: Normal  GAIT: Antalgic  SKIN: Normal  and No erythema, warmth, fluctuance     KNEE EXAM:    left:   Effusion: Moderate joint effusion  TTP: Yes over Medial Joint Line   Yes crepitus with passive knee ROM  Passive ROM: Extension 0, Flexion 115  No pain with manipulation of patella  Stable to varus/valgus stress. No increased laxity to anterior/posterior drawer testing  negative Patricia's test  No pain with IR/ER rotation of the hip  5/5 strength in knee flexion and extension, ankle plantarflexion and dorsiflexion  Neurovascular Status: Sensation intact to light touch in Sural, Saphenous, SPN, DPN, Tibial nerve distribution  2+ pulse DP/PT, normal capillary refill, foot has normal warmth    DATA:  Diagnostic tests reviewed for today's visit:     4v of the knee reveal Severe degenerative changes of the Medial and Patellofemoral compartment. There is evidence of advanced osteoarthritis changes with Subchondral sclerosis, Cyst formation, Osteophyte formation, and Joint space narrowing. The limb is in varus alignment. The patella is tracking midline.  Multiple loose bodies 1 in the suprapatellar area that appears to be consistent with a synovial osteochondroma

## 2024-08-08 ENCOUNTER — OFFICE VISIT (OUTPATIENT)
Dept: HEMATOLOGY/ONCOLOGY | Facility: CLINIC | Age: 84
End: 2024-08-08
Payer: MEDICARE

## 2024-08-08 ENCOUNTER — LAB VISIT (OUTPATIENT)
Dept: LAB | Facility: HOSPITAL | Age: 84
End: 2024-08-08
Attending: STUDENT IN AN ORGANIZED HEALTH CARE EDUCATION/TRAINING PROGRAM
Payer: MEDICARE

## 2024-08-08 VITALS
DIASTOLIC BLOOD PRESSURE: 64 MMHG | HEART RATE: 85 BPM | BODY MASS INDEX: 29.42 KG/M2 | WEIGHT: 229.25 LBS | HEIGHT: 74 IN | OXYGEN SATURATION: 97 % | SYSTOLIC BLOOD PRESSURE: 174 MMHG

## 2024-08-08 DIAGNOSIS — D47.2 MGUS (MONOCLONAL GAMMOPATHY OF UNKNOWN SIGNIFICANCE): ICD-10-CM

## 2024-08-08 DIAGNOSIS — D47.2 MGUS (MONOCLONAL GAMMOPATHY OF UNKNOWN SIGNIFICANCE): Primary | ICD-10-CM

## 2024-08-08 DIAGNOSIS — I10 HTN (HYPERTENSION), BENIGN: ICD-10-CM

## 2024-08-08 DIAGNOSIS — E11.59 TYPE 2 DIABETES MELLITUS WITH OTHER CIRCULATORY COMPLICATIONS: ICD-10-CM

## 2024-08-08 DIAGNOSIS — N18.4 CHRONIC KIDNEY DISEASE (CKD), STAGE IV (SEVERE): ICD-10-CM

## 2024-08-08 DIAGNOSIS — N28.9 FUNCTION KIDNEY DECREASED: ICD-10-CM

## 2024-08-08 DIAGNOSIS — N40.0 BENIGN PROSTATIC HYPERPLASIA, UNSPECIFIED WHETHER LOWER URINARY TRACT SYMPTOMS PRESENT: ICD-10-CM

## 2024-08-08 DIAGNOSIS — Z85.46 HISTORY OF PROSTATE CANCER: ICD-10-CM

## 2024-08-08 DIAGNOSIS — E78.00 HYPERCHOLESTEROLEMIA: ICD-10-CM

## 2024-08-08 PROCEDURE — 3078F DIAST BP <80 MM HG: CPT | Mod: CPTII,S$GLB,, | Performed by: STUDENT IN AN ORGANIZED HEALTH CARE EDUCATION/TRAINING PROGRAM

## 2024-08-08 PROCEDURE — 99999 PR PBB SHADOW E&M-EST. PATIENT-LVL IV: CPT | Mod: PBBFAC,,, | Performed by: STUDENT IN AN ORGANIZED HEALTH CARE EDUCATION/TRAINING PROGRAM

## 2024-08-08 PROCEDURE — 84156 ASSAY OF PROTEIN URINE: CPT | Mod: 91 | Performed by: STUDENT IN AN ORGANIZED HEALTH CARE EDUCATION/TRAINING PROGRAM

## 2024-08-08 PROCEDURE — 1101F PT FALLS ASSESS-DOCD LE1/YR: CPT | Mod: CPTII,S$GLB,, | Performed by: STUDENT IN AN ORGANIZED HEALTH CARE EDUCATION/TRAINING PROGRAM

## 2024-08-08 PROCEDURE — 99215 OFFICE O/P EST HI 40 MIN: CPT | Mod: S$GLB,,, | Performed by: STUDENT IN AN ORGANIZED HEALTH CARE EDUCATION/TRAINING PROGRAM

## 2024-08-08 PROCEDURE — 84156 ASSAY OF PROTEIN URINE: CPT | Performed by: STUDENT IN AN ORGANIZED HEALTH CARE EDUCATION/TRAINING PROGRAM

## 2024-08-08 PROCEDURE — 84166 PROTEIN E-PHORESIS/URINE/CSF: CPT | Performed by: STUDENT IN AN ORGANIZED HEALTH CARE EDUCATION/TRAINING PROGRAM

## 2024-08-08 PROCEDURE — 3077F SYST BP >= 140 MM HG: CPT | Mod: CPTII,S$GLB,, | Performed by: STUDENT IN AN ORGANIZED HEALTH CARE EDUCATION/TRAINING PROGRAM

## 2024-08-08 PROCEDURE — 3288F FALL RISK ASSESSMENT DOCD: CPT | Mod: CPTII,S$GLB,, | Performed by: STUDENT IN AN ORGANIZED HEALTH CARE EDUCATION/TRAINING PROGRAM

## 2024-08-08 PROCEDURE — 86335 IMMUNFIX E-PHORSIS/URINE/CSF: CPT | Mod: 26,,, | Performed by: PATHOLOGY

## 2024-08-08 PROCEDURE — 1125F AMNT PAIN NOTED PAIN PRSNT: CPT | Mod: CPTII,S$GLB,, | Performed by: STUDENT IN AN ORGANIZED HEALTH CARE EDUCATION/TRAINING PROGRAM

## 2024-08-08 PROCEDURE — G2211 COMPLEX E/M VISIT ADD ON: HCPCS | Mod: S$GLB,,, | Performed by: STUDENT IN AN ORGANIZED HEALTH CARE EDUCATION/TRAINING PROGRAM

## 2024-08-08 PROCEDURE — 1159F MED LIST DOCD IN RCRD: CPT | Mod: CPTII,S$GLB,, | Performed by: STUDENT IN AN ORGANIZED HEALTH CARE EDUCATION/TRAINING PROGRAM

## 2024-08-08 PROCEDURE — 86335 IMMUNFIX E-PHORSIS/URINE/CSF: CPT | Performed by: STUDENT IN AN ORGANIZED HEALTH CARE EDUCATION/TRAINING PROGRAM

## 2024-08-08 PROCEDURE — 84166 PROTEIN E-PHORESIS/URINE/CSF: CPT | Mod: 26,,, | Performed by: PATHOLOGY

## 2024-08-09 LAB
PROT 24H UR-MRATE: 462 MG/SPEC (ref 0–100)
PROT UR-MCNC: 33 MG/DL (ref 0–15)
PROT UR-MCNC: 34 MG/DL (ref 0–15)
URINE COLLECTION DURATION: 24 HR
URINE VOLUME: 1400 ML

## 2024-08-11 LAB — PROT PATTERN UR ELPH-IMP: NORMAL

## 2024-08-12 LAB — INTERPRETATION UR IFE-IMP: NORMAL

## 2024-08-13 LAB
PATHOLOGIST INTERPRETATION UIFE: NORMAL
PATHOLOGIST INTERPRETATION UPE: NORMAL

## 2024-08-14 ENCOUNTER — HOSPITAL ENCOUNTER (OUTPATIENT)
Dept: CARDIOLOGY | Facility: HOSPITAL | Age: 84
Discharge: HOME | End: 2024-08-14
Attending: INTERNAL MEDICINE
Payer: MEDICARE

## 2024-08-14 ENCOUNTER — HOSPITAL ENCOUNTER (OUTPATIENT)
Dept: RADIOLOGY | Facility: HOSPITAL | Age: 84
Discharge: HOME | End: 2024-08-14
Attending: INTERNAL MEDICINE
Payer: MEDICARE

## 2024-08-14 DIAGNOSIS — R06.09 DOE (DYSPNEA ON EXERTION): ICD-10-CM

## 2024-08-14 DIAGNOSIS — R06.02 SOB (SHORTNESS OF BREATH): ICD-10-CM

## 2024-08-14 LAB
CV STRESS BASE HR: 67 BPM
DIASTOLIC BLOOD PRESSURE: 62 MMHG
NUC STRESS DIASTOLIC VOLUME INDEX: 119
NUC STRESS EJECTION FRACTION: 56 %
NUC STRESS SYSTOLIC VOLUME INDEX: 52
OHS CV CPX 85 PERCENT MAX PREDICTED HEART RATE MALE: 115
OHS CV CPX MAX PREDICTED HEART RATE: 135
OHS CV CPX PATIENT IS FEMALE: 0
OHS CV CPX PATIENT IS MALE: 1
OHS CV CPX PEAK DIASTOLIC BLOOD PRESSURE: 43 MMHG
OHS CV CPX PEAK HEAR RATE: 78 BPM
OHS CV CPX PEAK RATE PRESSURE PRODUCT: NORMAL
OHS CV CPX PEAK SYSTOLIC BLOOD PRESSURE: 164 MMHG
OHS CV CPX PERCENT MAX PREDICTED HEART RATE ACHIEVED: 58
OHS CV CPX RATE PRESSURE PRODUCT PRESENTING: NORMAL
SYSTOLIC BLOOD PRESSURE: 164 MMHG

## 2024-08-14 PROCEDURE — 78452 HT MUSCLE IMAGE SPECT MULT: CPT

## 2024-08-14 PROCEDURE — 93018 CV STRESS TEST I&R ONLY: CPT | Mod: ,,, | Performed by: INTERNAL MEDICINE

## 2024-08-14 PROCEDURE — 63600175 PHARM REV CODE 636 W HCPCS: Performed by: INTERNAL MEDICINE

## 2024-08-14 PROCEDURE — 93017 CV STRESS TEST TRACING ONLY: CPT

## 2024-08-14 PROCEDURE — 93016 CV STRESS TEST SUPVJ ONLY: CPT | Mod: ,,, | Performed by: INTERNAL MEDICINE

## 2024-08-14 PROCEDURE — A9502 TC99M TETROFOSMIN: HCPCS | Performed by: INTERNAL MEDICINE

## 2024-08-14 PROCEDURE — 78452 HT MUSCLE IMAGE SPECT MULT: CPT | Mod: 26,,, | Performed by: INTERNAL MEDICINE

## 2024-08-14 RX ORDER — REGADENOSON 0.08 MG/ML
0.4 INJECTION, SOLUTION INTRAVENOUS ONCE
Status: COMPLETED | OUTPATIENT
Start: 2024-08-14 | End: 2024-08-14

## 2024-08-14 RX ADMIN — REGADENOSON 0.4 MG: 0.08 INJECTION, SOLUTION INTRAVENOUS at 11:08

## 2024-08-14 RX ADMIN — TETROFOSMIN 10.4 MILLICURIE: 1.38 INJECTION, POWDER, LYOPHILIZED, FOR SOLUTION INTRAVENOUS at 10:08

## 2024-08-14 RX ADMIN — TETROFOSMIN 30.6 MILLICURIE: 1.38 INJECTION, POWDER, LYOPHILIZED, FOR SOLUTION INTRAVENOUS at 11:08

## 2024-08-16 ENCOUNTER — TELEPHONE (OUTPATIENT)
Dept: HEMATOLOGY/ONCOLOGY | Facility: CLINIC | Age: 84
End: 2024-08-16
Payer: MEDICARE

## 2024-08-16 NOTE — TELEPHONE ENCOUNTER
----- Message from Keyanna Campbell MD sent at 8/14/2024 11:36 AM CDT -----  Please let him know labs are stable  ----- Message -----  From: [facility]  Sent: 8/9/2024   5:31 PM CDT  To: Keyanna Campbell MD

## 2024-08-23 RX ORDER — SODIUM BICARBONATE 650 MG/1
650 TABLET ORAL 2 TIMES DAILY
Qty: 180 TABLET | Refills: 3 | Status: SHIPPED | OUTPATIENT
Start: 2024-08-23

## 2024-08-30 DIAGNOSIS — I10 HTN (HYPERTENSION), BENIGN: ICD-10-CM

## 2024-08-30 RX ORDER — NIFEDIPINE 90 MG/1
90 TABLET, EXTENDED RELEASE ORAL DAILY
Qty: 90 TABLET | Refills: 0 | Status: SHIPPED | OUTPATIENT
Start: 2024-08-30

## 2024-08-30 NOTE — TELEPHONE ENCOUNTER
----- Message from Gigi Duarte sent at 8/30/2024 10:35 AM CDT -----  Who Called:        Refill or New Rx:  refill / pharmacy request approval         RX Name and Strength:    NIFEdipine (PROCARDIA-XL) 90 MG (OSM) 24 hr tablet    Is this a 30 day or 90 day RX    Preferred Pharmacy with phone number: [id number] - [facility] [address] [facility]    Local or Mail Order: local     Would the patient rather a call back or a response via [facility]? Call back         Best Call Back Number:        Additional Information:

## 2024-08-30 NOTE — TELEPHONE ENCOUNTER
Care Due:                  Date            Visit Type   Department     Provider  --------------------------------------------------------------------------------                                EP -                              PRIMARY      [facility] INTERNAL  Last Visit: 06-      CARE ([facility])   MEDICINE       Moe Gallardo  Next Visit: None Scheduled  None         None Found                                                            Last  Test          Frequency    Reason                     Performed    Due Date  --------------------------------------------------------------------------------    Uric Acid...  12 months..  allopurinoL..............  07- 07-    Vitamin D...  12 months..  cholecalciferol,.........  07- 07-    [employer] Embedded Care Due Messages. Reference number: [id number].   8/30/2024 11:23:06 AM CDT

## 2024-09-22 NOTE — PROGRESS NOTES
"Subjective:       Patient ID: Tip Hurst is a 83 y.o. male.    Chief Complaint: Follow-up (6 month HTN)    Here for urgent visit    81 yo with PMHx f prostate CA with biochemical recurrence, HTN, CKD stage 3, Paraproteinemia, gout, elevated alkaline phosphatase.     GFR worsening. Stressed better BP control and needs to f/u with nephrology ASAP. Recent [place]./[place] medical renal disease.    He has no complaints today  PSA due in 2 months this is scheduled we will get our labs then     Patient presents today for routine evaluation, physical, and labs. Patient has no major concerns or complaints today.      ### DM###  Diet controlled                        HGBA1C                   5.5                 07/26/2023 10:34 AM        HGBA1C                   6.5 (H)             01/06/2020 09:35 AM        HGBA1C                   6.3 (H)             06/19/2018 10:30 AM        HGBA1C                   6.1                 05/27/2011 08:24 AM        HGBA1C                   6.1                 09/21/2010 09:13 AM      BG on chemistries have frederick reassuring.      ### IgM monoclonal gammopathy  ###  -24 hr urine studies 2020 with no paraprotein and moderate proteinura   -IFX showed IgM lambda monoclonal band. UPEP is negative for paraprotein bands.   -Patient did not show for BMBx appointment on 2/20/20 8/2022 [facility] NP Lidia Adler writes "Certainly could have low grade Lymphoproliferative disorder but given asmptomatic/stable labs would likely monitor anyway. In  Light of age, comorbidites and no overt progression can defer marrow and recommend fu in 6 months (vs annualy since no marrow)     ### Anemia ###  Heme suspects this is AOCD and less likely related to gammopathy.        ### prostate CA ###  Biochemical recurrence with very slow rise in PSA over the last 10 years.  -[facility] (10/2022) Dr West writes "Again, no need for additional treatment such as ADT at this time (or hopefully ever)-Follow-up 6 months with PSA"  -In " 2018 he had incidental finding of a possible bladder lesion on ultrasound. Cystoscopy was normal at that time.      ### CKD ###           ESTGFRAFRICA             45.7 (A)            03/09/2021 08:59 AM        ESTGFRAFRICA             39.9 (A)            08/11/2020 07:41 AM        ESTGFRAFRICA             50 (A)              04/03/2020 08:27 AM        ESTGFRAFRICA             46 (A)              01/16/2020 10:10 AM        ESTGFRAFRICA             46 (A)              01/06/2020 09:35 AM        ESTGFRAFRICA             43 (A)              06/22/2018 02:10 PM        ESTGFRAFRICA             47 (A)              06/19/2018 10:30 AM        ESTGFRAFRICA             56.0 (A)            03/17/2015 09:22 AM        ESTGFRAFRICA             56.0 (A)            12/31/2014 12:05 PM        ESTGFRAFRICA             52.3 (A)            09/05/2013 09:22 AM         Bilateral knee pain and aching of hips once a month      Lidia Adler, NP at 8/4/20   In Light of age, comorbidites and no overt progression can defer marrow and recommend fu in 6 months (vs annualy since no marrow)  MICHAEL chronic and gradually worsening.             Review of Systems   Constitutional:  Negative for appetite change, chills, fever and unexpected weight change.   HENT:  Negative for hearing loss, sore throat and trouble swallowing.    Eyes:  Negative for visual disturbance.   Respiratory:  Positive for shortness of breath. Negative for cough and chest tightness.    Cardiovascular:  Negative for chest pain and leg swelling.   Gastrointestinal:  Negative for abdominal pain, blood in stool, constipation, diarrhea, nausea and vomiting.   Endocrine: Negative for polydipsia and polyuria.   Genitourinary:  Negative for decreased urine volume, difficulty urinating, dysuria, frequency and urgency.   Musculoskeletal:  Negative for gait problem.   Skin:  Negative for rash.   Neurological:  Negative for dizziness and numbness.   Psychiatric/Behavioral:  The patient is  "not nervous/anxious.        Objective:      Vitals:    06/11/24 1513   BP: (!) 140/52   BP Location: Left arm   Patient Position: Sitting   BP Method: Medium (Manual)   Pulse: 86   SpO2: 97%   Weight: 102.1 kg (225 lb)   Height: 6' 2" (1.88 m)      Physical Exam  Vitals and nursing note reviewed.   Constitutional:       General: He is not in acute distress.     Appearance: Normal appearance. He is well-developed.   HENT:      Head: Normocephalic and atraumatic.      Mouth/Throat:      Pharynx: No oropharyngeal exudate.   Eyes:      General: No scleral icterus.     Conjunctiva/sclera: Conjunctivae normal.      Pupils: Pupils are equal, round, and reactive to light.   Neck:      Thyroid: No thyromegaly.   Cardiovascular:      Rate and Rhythm: Normal rate and regular rhythm.      Heart sounds: Normal heart sounds. No murmur heard.  Pulmonary:      Effort: Pulmonary effort is normal.      Breath sounds: Normal breath sounds. No wheezing or rales.   Abdominal:      General: There is no distension.   Musculoskeletal:         General: No tenderness.   Lymphadenopathy:      Cervical: No cervical adenopathy.   Skin:     General: Skin is warm and dry.   Neurological:      Mental Status: He is alert and oriented to person, place, and time.   Psychiatric:         Behavior: Behavior normal.         Assessment:       1. Paraproteinemia    2. Chronic kidney disease, stage 3a    3. Type 2 diabetes mellitus with other circulatory complications    4. HTN (hypertension), benign    5. Prostate cancer    6. Hypothyroidism, unspecified type    7. Essential (hemorrhagic) thrombocythemia    8. Gout, arthritis    9. Elevated alkaline phosphatase level    10. Atherosclerosis of aorta    11. Secondary hyperparathyroidism of renal origin    12. Idiopathic gout of right foot, unspecified chronicity    13. Primary osteoarthritis of left knee    14. MICHAEL (dyspnea on exertion)    15. Heart murmur        Plan:       Tip was seen today for " follow-up.    Diagnoses and all orders for this visit:    Paraproteinemia  -     CBC Auto Differential; Future  -     IMMUNOGLOBULINS (IGG, IGA, IGM) QUANTITATIVE; Future  -     PROTEIN ELECTROPHORESIS, SERUM; Future  -     IMMUNOGLOBULIN FREE LT CHAINS BLOOD; Future    Chronic kidney disease, stage 3a  -     Comprehensive Metabolic Panel; Future    Type 2 diabetes mellitus with other circulatory complications  -     CBC Auto Differential; Future  -     Hemoglobin A1C; Future  -     Ambulatory referral/consult to Optometry; Future    HTN (hypertension), benign  -     Comprehensive Metabolic Panel; Future  -     Lipid Panel; Future  -     TSH; Future    Prostate cancer  -     CBC Auto Differential; Future    Hypothyroidism, unspecified type  -     TSH; Future    Essential (hemorrhagic) thrombocythemia  -     Comprehensive Metabolic Panel; Future  -     Lipid Panel; Future    Gout, arthritis  -     TSH; Future    Elevated alkaline phosphatase level  -     Comprehensive Metabolic Panel; Future    Atherosclerosis of aorta  -     Lipid Panel; Future    Secondary hyperparathyroidism of renal origin  -     Comprehensive Metabolic Panel; Future    Idiopathic gout of right foot, unspecified chronicity    Primary osteoarthritis of left knee    MICHAEL (dyspnea on exertion)  -     Echo; Future  -     Cancel: Stress Echo Which stress agent will be used? Pharmacological; Color Flow Doppler? No; Future    Heart murmur    Other orders  -     hydroCHLOROthiazide (HYDRODIURIL) 25 MG tablet; Take 1 tablet (25 mg total) by mouth once daily.  -     diclofenac sodium (VOLTAREN) 1 % Gel; Apply 2 g topically 4 (four) times daily.       Visit today is associated with current or anticipated ongoing medical care related to this patient's single serious condition/complex condition of DM, HTN, . The patient will return to see me as these issues will be followed longitudinally.      Moe Contreras MD  Internal Medicine-[facility]  effects of medication(s) were discussed in detail and patient voiced understanding.  Patient will call back for any issues or complications.

## 2024-10-08 DIAGNOSIS — E78.00 HYPERCHOLESTEROLEMIA: ICD-10-CM

## 2024-10-08 NOTE — TELEPHONE ENCOUNTER
From 9/15/23 encounter:    ----- Message from Moe Gallardo MD sent at 9/15/2023  4:27 PM CDT -----  Please notify patient of results:  May I increase your simvastatin to 80mg or change you to lipitor 40mg. LDL cholesterol is too high.  Also  f/u in 7 days for nurse visit for BP check

## 2024-10-08 NOTE — TELEPHONE ENCOUNTER
----- Message from Yvette sent at 10/8/2024 10:59 AM CDT -----  Contact: [phone]  .[email or web address]     Patient is calling for Medical Advice regarding:pt is calling he is out of this medication simvastatin (ZOCOR) 40 MG tablet but he states it is 80MG Tablet (not listed on chart).  Please call pt back and advise.    How long has patient had these symptoms:    Pharmacy name and phone#:  [facility]/pharmacy #[id number] - [place], [place] - [address] [address] [facility] [address]  Phone: [phone] Fax: [phone]       Patient wants a call back or thru [facility]:callback    Comments:    Please advise patient replies from provider may take up to 48 hours.

## 2024-10-08 NOTE — TELEPHONE ENCOUNTER
No care due was identified.  [facility] Embedded Care Due Messages. Reference number: [id number].   10/08/2024 11:08:48 AM CDT

## 2024-10-09 RX ORDER — SIMVASTATIN 80 MG/1
80 TABLET, FILM COATED ORAL NIGHTLY
Qty: 90 TABLET | Refills: 3 | Status: SHIPPED | OUTPATIENT
Start: 2024-10-09

## 2024-10-11 ENCOUNTER — OFFICE VISIT (OUTPATIENT)
Dept: UROLOGY | Facility: CLINIC | Age: 84
End: 2024-10-11
Payer: MEDICARE

## 2024-10-11 VITALS
HEART RATE: 97 BPM | SYSTOLIC BLOOD PRESSURE: 170 MMHG | RESPIRATION RATE: 16 BRPM | HEIGHT: 74 IN | DIASTOLIC BLOOD PRESSURE: 64 MMHG | BODY MASS INDEX: 29.42 KG/M2 | WEIGHT: 229.25 LBS

## 2024-10-11 DIAGNOSIS — C61 PROSTATE CANCER: Primary | ICD-10-CM

## 2024-10-11 NOTE — TELEPHONE ENCOUNTER
----- Message from Tianna Duarte sent at 4/6/2020  9:15 AM CDT -----  Contact: Tip   tel:    [phone]    Caller says he went and gave blood on Friday and the  Was supposed to call him today.     Jose call .   Says he is waiting to speak to you for his appt. Today over the phone .    Compression of brain

## 2024-10-11 NOTE — Clinical Note
Please schedule a visit with Dr. Kay in 6 months with PSA at the [facility]. Former patient of Dr. West's but prefers [facility] for transportation. Thanks!

## 2024-10-11 NOTE — PROGRESS NOTES
Subjective:      Tip Hurst is a 83 y.o. male who returns today regarding his prostate cancer. Established patient of Dr. West's and new to me today.    He has history of prostate cancer s/p prostatectomy in 2006. No known recurrence. No adjuvant treatments reported. Path unknown.  He has had biochemical recurrence per PSA testing beginning in 2010 with a very slow rise in PSA in the interim. Recently it has actually decreased- due for labs today.     Component      Latest Ref Rng 1/16/2020 3/18/2020 9/26/2022 4/6/2023   PSA Total      0.00 - 4.00 ng/mL       Prostate Specific Antigen Free      0.00 - 1.50 ng/mL       PSA, Free %      Not established %       Prostate Specific Antigen      0.00 - 4.00 ng/mL 0.41       PSA Diagnostic      0.00 - 4.00 ng/mL  0.39  1.1  1.6      Component      Latest Ref Rng 10/10/2023 4/8/2024   PSA Total      0.00 - 4.00 ng/mL 0.58     Prostate Specific Antigen Free      0.00 - 1.50 ng/mL 0.10     PSA, Free %      Not established % 17.24     Prostate Specific Antigen      0.00 - 4.00 ng/mL     PSA Diagnostic      0.00 - 4.00 ng/mL  0.27      In 2018 he had incidental finding of a possible bladder lesion on ultrasound.  Cystoscopy was normal at that time.     Voiding well. Denies dysuria and gross hematuria.    The following portions of the patient's history were reviewed and updated as appropriate: allergies, current medications, past family history, past medical history, past social history, past surgical history and problem list.    Review of Systems  Constitutional: no fever or chills  ENT: no nasal congestion or sore throat  Respiratory: no cough or shortness of breath  Cardiovascular: no chest pain or palpitations  Gastrointestinal: no nausea or vomiting, tolerating diet  Genitourinary: as per HPI  Hematologic/Lymphatic: no easy bruising or lymphadenopathy  Musculoskeletal: no arthralgias or myalgias  Neurological: no seizures or tremors  Behavioral/Psych: no auditory or  visual hallucinations     Objective:   Vitals:   Vitals:    10/11/24 1047   BP: (!) 170/64   Pulse: 97   Resp: 16     Physical Exam   General: alert and oriented, no acute distress  Head: normocephalic, atraumatic  Neck: supple, normal ROM  Respiratory: Symmetric expansion, non-labored breathing  Cardiovascular: regular rate and rhythm  Abdomen: soft, non tender, non distended  Genitourinary: deferred  Skin: normal coloration and turgor, no rashes, no suspicious skin lesions noted  Neuro: alert and oriented x3, no gross deficits  Psych: normal judgment and insight, normal mood/affect, and non-anxious    Lab Review   Urinalysis demonstrates : no specimen  Lab Results   Component Value Date    WBC 11.20 06/11/2024    HGB 10.2 (L) 06/11/2024    HCT 32.0 (L) 06/11/2024    MCV 88 06/11/2024     06/11/2024     Lab Results   Component Value Date    CREATININE 2.7 (H) 06/11/2024    BUN 35 (H) 06/11/2024     Lab Results   Component Value Date    PSA 0.41 01/16/2020     Imaging   None    Assessment:     1. Prostate cancer      Plan:   Diagnoses and all orders for this visit:    Prostate cancer  -     Prostate Specific Antigen, Diagnostic; Future  -     Prostate Specific Antigen, Diagnostic; Future    Plan:  --PSA today  --If PSA continues to trend down will repeat in 6 months  --Patient prefers to follow up at [facility] location due to transportation

## 2024-10-14 ENCOUNTER — PATIENT OUTREACH (OUTPATIENT)
Dept: ADMINISTRATIVE | Facility: HOSPITAL | Age: 84
End: 2024-10-14
Payer: MEDICARE

## 2024-10-14 DIAGNOSIS — C61 PROSTATE CANCER: Primary | ICD-10-CM

## 2024-10-14 DIAGNOSIS — E11.9 DIABETES MELLITUS WITHOUT COMPLICATION: Primary | ICD-10-CM

## 2024-10-29 ENCOUNTER — OFFICE VISIT (OUTPATIENT)
Dept: OPTOMETRY | Facility: CLINIC | Age: 84
End: 2024-10-29
Payer: MEDICARE

## 2024-10-29 ENCOUNTER — LAB VISIT (OUTPATIENT)
Dept: LAB | Facility: HOSPITAL | Age: 84
End: 2024-10-29
Attending: INTERNAL MEDICINE
Payer: MEDICARE

## 2024-10-29 DIAGNOSIS — E11.9 DIABETES MELLITUS WITHOUT COMPLICATION: ICD-10-CM

## 2024-10-29 DIAGNOSIS — H52.4 HYPEROPIA OF BOTH EYES WITH ASTIGMATISM AND PRESBYOPIA: ICD-10-CM

## 2024-10-29 DIAGNOSIS — Z13.5 GLAUCOMA SCREENING: ICD-10-CM

## 2024-10-29 DIAGNOSIS — H25.13 NUCLEAR SCLEROSIS, BILATERAL: ICD-10-CM

## 2024-10-29 DIAGNOSIS — H52.03 HYPEROPIA OF BOTH EYES WITH ASTIGMATISM AND PRESBYOPIA: ICD-10-CM

## 2024-10-29 DIAGNOSIS — H52.203 HYPEROPIA OF BOTH EYES WITH ASTIGMATISM AND PRESBYOPIA: ICD-10-CM

## 2024-10-29 DIAGNOSIS — E11.9 DIABETES MELLITUS WITHOUT COMPLICATION: Primary | ICD-10-CM

## 2024-10-29 LAB
ALBUMIN/CREAT UR: 302 UG/MG (ref 0–30)
CREAT UR-MCNC: 51 MG/DL (ref 23–375)
MICROALBUMIN UR DL<=1MG/L-MCNC: 154 UG/ML

## 2024-10-29 PROCEDURE — 92004 COMPRE OPH EXAM NEW PT 1/>: CPT | Mod: S$GLB,,, | Performed by: OPTOMETRIST

## 2024-10-29 PROCEDURE — 99999 PR PBB SHADOW E&M-EST. PATIENT-LVL III: CPT | Mod: PBBFAC,,, | Performed by: OPTOMETRIST

## 2024-10-29 PROCEDURE — 2023F DILAT RTA XM W/O RTNOPTHY: CPT | Mod: CPTII,S$GLB,, | Performed by: OPTOMETRIST

## 2024-10-29 PROCEDURE — 82043 UR ALBUMIN QUANTITATIVE: CPT | Performed by: INTERNAL MEDICINE

## 2024-10-29 PROCEDURE — 1159F MED LIST DOCD IN RCRD: CPT | Mod: CPTII,S$GLB,, | Performed by: OPTOMETRIST

## 2024-10-29 PROCEDURE — 92015 DETERMINE REFRACTIVE STATE: CPT | Mod: S$GLB,,, | Performed by: OPTOMETRIST

## 2024-10-29 PROCEDURE — 82570 ASSAY OF URINE CREATININE: CPT | Performed by: INTERNAL MEDICINE

## 2024-10-30 ENCOUNTER — OFFICE VISIT (OUTPATIENT)
Dept: NEPHROLOGY | Facility: CLINIC | Age: 84
End: 2024-10-30
Payer: MEDICARE

## 2024-10-30 DIAGNOSIS — I12.9 TYPE 2 DM WITH CKD STAGE 3 AND HYPERTENSION: ICD-10-CM

## 2024-10-30 DIAGNOSIS — N17.9 AKI (ACUTE KIDNEY INJURY): Primary | ICD-10-CM

## 2024-10-30 DIAGNOSIS — D63.1 ANEMIA IN STAGE 3B CHRONIC KIDNEY DISEASE: ICD-10-CM

## 2024-10-30 DIAGNOSIS — R80.9 PROTEINURIA, UNSPECIFIED TYPE: ICD-10-CM

## 2024-10-30 DIAGNOSIS — M10.00 IDIOPATHIC GOUT, UNSPECIFIED CHRONICITY, UNSPECIFIED SITE: ICD-10-CM

## 2024-10-30 DIAGNOSIS — D47.2 MGUS (MONOCLONAL GAMMOPATHY OF UNKNOWN SIGNIFICANCE): ICD-10-CM

## 2024-10-30 DIAGNOSIS — N18.32 STAGE 3B CHRONIC KIDNEY DISEASE: ICD-10-CM

## 2024-10-30 DIAGNOSIS — N18.30 TYPE 2 DM WITH CKD STAGE 3 AND HYPERTENSION: ICD-10-CM

## 2024-10-30 DIAGNOSIS — I10 HTN (HYPERTENSION), BENIGN: ICD-10-CM

## 2024-10-30 DIAGNOSIS — E11.22 TYPE 2 DM WITH CKD STAGE 3 AND HYPERTENSION: ICD-10-CM

## 2024-10-30 DIAGNOSIS — N18.32 ANEMIA IN STAGE 3B CHRONIC KIDNEY DISEASE: ICD-10-CM

## 2024-10-30 PROCEDURE — 99214 OFFICE O/P EST MOD 30 MIN: CPT | Mod: 95,,, | Performed by: NURSE PRACTITIONER

## 2024-11-05 ENCOUNTER — TELEPHONE (OUTPATIENT)
Dept: INTERNAL MEDICINE | Facility: CLINIC | Age: 84
End: 2024-11-05
Payer: MEDICARE

## 2024-11-05 DIAGNOSIS — M10.071 IDIOPATHIC GOUT OF RIGHT FOOT, UNSPECIFIED CHRONICITY: ICD-10-CM

## 2024-11-05 DIAGNOSIS — I10 HTN (HYPERTENSION), BENIGN: ICD-10-CM

## 2024-11-05 DIAGNOSIS — R06.02 SOB (SHORTNESS OF BREATH): ICD-10-CM

## 2024-11-05 RX ORDER — ALLOPURINOL 100 MG/1
100 TABLET ORAL DAILY
Qty: 90 TABLET | Refills: 3 | Status: SHIPPED | OUTPATIENT
Start: 2024-11-05 | End: 2025-11-05

## 2024-11-05 RX ORDER — NIFEDIPINE 90 MG/1
90 TABLET, EXTENDED RELEASE ORAL DAILY
Qty: 90 TABLET | Refills: 3 | Status: SHIPPED | OUTPATIENT
Start: 2024-11-05 | End: 2025-11-05

## 2024-11-05 RX ORDER — LEVOCETIRIZINE DIHYDROCHLORIDE 5 MG/1
5 TABLET, FILM COATED ORAL NIGHTLY
Qty: 90 TABLET | Refills: 3 | Status: SHIPPED | OUTPATIENT
Start: 2024-11-05 | End: 2025-11-05

## 2024-11-05 RX ORDER — LOSARTAN POTASSIUM 100 MG/1
100 TABLET ORAL DAILY
Qty: 90 TABLET | Refills: 3 | Status: SHIPPED | OUTPATIENT
Start: 2024-11-05 | End: 2025-11-05

## 2024-11-05 NOTE — TELEPHONE ENCOUNTER
"Spoke with [facility], to see if patient still had valid refills. Staff member says they have been trying to reach Dr. Gallardo RE:   allopurinoL (ZYLOPRIM) 100 MG tablet   levocetirizine (XYZAL) 5 MG tablet   losartan (COZAAR) 100 MG tablet   NIFEdipine (PROCARDIA-XL) 90 MG (OSM) 24 hr tablet     Allergies verified & prescriptions pended & routed to Dr. Gallardo.    "     Dorothy Barba LPN  Licensed Nurse     Telephone Encounter  Signed     Encounter Date: 11/5/2024            ----- Message from Pedro sent at 11/4/2024 10:07 AM CST -----  Contact: Pt      [phone]  Requesting an RX refill or new RX.     Is this a refill or new RX: Refill     RX name and strength (copy/paste from chart):  losartan (COZAAR) 100 MG tablet     Is this a 30 day or 90 day RX: 90     Pharmacy name and phone # (copy/paste from chart):  [facility]/pharmacy #[id number] [address]    Phone:[phone]  Fax:[phone]              The doctors have asked that we provide their patients with the following 2 reminders -- prescription refills can take up to 72 hours, and a friendly reminder that in the future you can use your [facility] account to request refills: yes  Requesting an RX refill or new RX.     Is this a refill or new RX: Refill     RX name and strength (copy/paste from chart):  allopurinoL (ZYLOPRIM) 100 MG tablet     Is this a 30 day or 90 day RX: 90     Pharmacy name and phone # (copy/paste from chart):       The doctors have asked that we provide their patients with the following 2 reminders -- prescription refills can take up to 72 hours, and a friendly reminder that in the future you can use your [employer] account to request refills: yes   Requesting an RX refill or new RX.     Is this a refill or new RX:      RX name and strength (copy/paste from chart):  hydroCHLOROthiazide (HYDRODIURIL) 25 MG tablet     Is this a 30 day or 90 day RX: 90     Pharmacy name and phone # (copy/paste from chart):       The " doctors have asked that we provide their patients with the following 2 reminders -- prescription refills can take up to 72 hours, and a friendly reminder that in the future you can use your [facility] account to request refills: yes  Requesting an RX refill or new RX.     Is this a refill or new RX: Refill     RX name and strength (copy/paste from chart):  NIFEdipine (PROCARDIA-XL) 90 MG (OSM) 24 hr tablet     Is this a 30 day or 90 day RX: 90     Pharmacy name and phone # (copy/paste from chart):       The doctors have asked that we provide their patients with the following 2 reminders -- prescription refills can take up to 72 hours, and a friendly reminder that in the future you can use your [facility] account to request refills: Yes

## 2024-11-05 NOTE — TELEPHONE ENCOUNTER
Called pt and his daughter aysha picked up. Stated she will call her father to get a reading and send it via portal.

## 2024-11-05 NOTE — TELEPHONE ENCOUNTER
Care Due:                  Date            Visit Type   Department     Provider  --------------------------------------------------------------------------------                                EP -                              PRIMARY      [facility] INTERNAL  Last Visit: 06-      CARE ([facility])   MEDICINE       oMe Gallardo  Next Visit: None Scheduled  None         None Found                                                            Last  Test          Frequency    Reason                     Performed    Due Date  --------------------------------------------------------------------------------    Uric Acid...  12 months..  allopurinoL..............  07- 07-    Vitamin D...  12 months..  cholecalciferol,.........  07- 07-    [employer] Embedded Care Due Messages. Reference number: [id number].   11/05/2024 10:34:42 AM CST

## 2024-11-05 NOTE — TELEPHONE ENCOUNTER
----- Message from Pedro sent at 11/4/2024 10:07 AM CST -----  Contact: Pt  [phone]  Requesting an RX refill or new RX.    Is this a refill or new RX: Refill    RX name and strength (copy/paste from chart):  losartan (COZAAR) 100 MG tablet    Is this a 30 day or 90 day RX: 90    Pharmacy name and phone # (copy/paste from chart):  [facility]/pharmacy #[id number] - [place], [place] - [address] [place] [address]   Phone: [phone]  Fax: [phone]          The doctors have asked that we provide their patients with the following 2 reminders -- prescription refills can take up to 72 hours, and a friendly reminder that in the future you can use your [employer] account to request refills: yes  Requesting an RX refill or new RX.    Is this a refill or new RX: Refill    RX name and strength (copy/paste from chart):  allopurinoL (ZYLOPRIM) 100 MG tablet    Is this a 30 day or 90 day RX: 90    Pharmacy name and phone # (copy/paste from chart):      The doctors have asked that we provide their patients with the following 2 reminders -- prescription refills can take up to 72 hours, and a friendly reminder that in the future you can use your [employer] account to request refills: yes   Requesting an RX refill or new RX.    Is this a refill or new RX:     RX name and strength (copy/paste from chart):  hydroCHLOROthiazide (HYDRODIURIL) 25 MG tablet    Is this a 30 day or 90 day RX: 90    Pharmacy name and phone # (copy/paste from chart):      The doctors have asked that we provide their patients with the following 2 reminders -- prescription refills can take up to 72 hours, and a friendly reminder that in the future you can use your [employer] account to request refills: yes  Requesting an RX refill or new RX.    Is this a refill or new RX: Refill    RX name and strength (copy/paste from chart):  NIFEdipine (PROCARDIA-XL) 90 MG (OSM) 24 hr tablet    Is this a 30 day or 90 day RX: 90    Pharmacy name and phone # (copy/paste from chart):       The doctors have asked that we provide their patients with the following 2 reminders -- prescription refills can take up to 72 hours, and a friendly reminder that in the future you can use your [facility] account to request refills: Yes

## 2024-11-07 ENCOUNTER — TELEPHONE (OUTPATIENT)
Dept: INTERNAL MEDICINE | Facility: CLINIC | Age: 84
End: 2024-11-07
Payer: MEDICARE

## 2024-11-07 ENCOUNTER — LAB VISIT (OUTPATIENT)
Dept: LAB | Facility: HOSPITAL | Age: 84
End: 2024-11-07
Attending: NURSE PRACTITIONER
Payer: MEDICARE

## 2024-11-07 DIAGNOSIS — N18.32 STAGE 3B CHRONIC KIDNEY DISEASE: ICD-10-CM

## 2024-11-07 LAB
25(OH)D3+25(OH)D2 SERPL-MCNC: 35 NG/ML (ref 30–96)
ALBUMIN SERPL BCP-MCNC: 4.1 G/DL (ref 3.5–5.2)
ANION GAP SERPL CALC-SCNC: 11 MMOL/L (ref 8–16)
BUN SERPL-MCNC: 35 MG/DL (ref 8–23)
CALCIUM SERPL-MCNC: 10.3 MG/DL (ref 8.7–10.5)
CHLORIDE SERPL-SCNC: 108 MMOL/L (ref 95–110)
CO2 SERPL-SCNC: 18 MMOL/L (ref 23–29)
CREAT SERPL-MCNC: 2.1 MG/DL (ref 0.5–1.4)
ERYTHROCYTE [DISTWIDTH] IN BLOOD BY AUTOMATED COUNT: 13.2 % (ref 11.5–14.5)
EST. GFR  (NO RACE VARIABLE): 30.1 ML/MIN/1.73 M^2
FERRITIN SERPL-MCNC: 1139 NG/ML (ref 20–300)
GLUCOSE SERPL-MCNC: 107 MG/DL (ref 70–110)
HCT VFR BLD AUTO: 31.9 % (ref 40–54)
HGB BLD-MCNC: 9.8 G/DL (ref 14–18)
IRON SERPL-MCNC: 66 UG/DL (ref 45–160)
MCH RBC QN AUTO: 28.1 PG (ref 27–31)
MCHC RBC AUTO-ENTMCNC: 30.7 G/DL (ref 32–36)
MCV RBC AUTO: 91 FL (ref 82–98)
PHOSPHATE SERPL-MCNC: 3.7 MG/DL (ref 2.7–4.5)
PLATELET # BLD AUTO: 418 K/UL (ref 150–450)
PMV BLD AUTO: 9.8 FL (ref 9.2–12.9)
POTASSIUM SERPL-SCNC: 5.6 MMOL/L (ref 3.5–5.1)
PTH-INTACT SERPL-MCNC: 127.8 PG/ML (ref 9–77)
RBC # BLD AUTO: 3.49 M/UL (ref 4.6–6.2)
SATURATED IRON: 21 % (ref 20–50)
SODIUM SERPL-SCNC: 137 MMOL/L (ref 136–145)
TOTAL IRON BINDING CAPACITY: 321 UG/DL (ref 250–450)
TRANSFERRIN SERPL-MCNC: 217 MG/DL (ref 200–375)
WBC # BLD AUTO: 8.62 K/UL (ref 3.9–12.7)

## 2024-11-07 PROCEDURE — 82728 ASSAY OF FERRITIN: CPT | Performed by: NURSE PRACTITIONER

## 2024-11-07 PROCEDURE — 83970 ASSAY OF PARATHORMONE: CPT | Performed by: NURSE PRACTITIONER

## 2024-11-07 PROCEDURE — 82306 VITAMIN D 25 HYDROXY: CPT | Performed by: NURSE PRACTITIONER

## 2024-11-07 PROCEDURE — 80069 RENAL FUNCTION PANEL: CPT | Performed by: NURSE PRACTITIONER

## 2024-11-07 PROCEDURE — 84466 ASSAY OF TRANSFERRIN: CPT | Performed by: NURSE PRACTITIONER

## 2024-11-07 PROCEDURE — 36415 COLL VENOUS BLD VENIPUNCTURE: CPT | Mod: PO | Performed by: NURSE PRACTITIONER

## 2024-11-07 PROCEDURE — 85027 COMPLETE CBC AUTOMATED: CPT | Performed by: NURSE PRACTITIONER

## 2024-11-07 NOTE — TELEPHONE ENCOUNTER
----- Message from Anna sent at 11/7/2024 12:47 PM CST -----  Contact: [phone]  Would like to receive medical advice.    Pt called and stated that he still in pain and the medication not working. Pt requesting a new medication to be called to pharmacy.      [facility]/pharmacy #[id number] - [facility] [address] [address] [place] [address]  Phone: [phone] Fax: [phone]      Would they like a call back or a response via [facility]:  call    Additional information:  Please call to advise.

## 2024-11-07 NOTE — TELEPHONE ENCOUNTER
Spoke to patient, he calling because his left knee is really hurting. Dr. Gallardo gave him a cream a few weeks ago for the knee pain but it's not helping. No swelling, he can put weight on the knee but has to walk with a cane. He went to an appt yesterday had to sit in his car 30minutes before he could get out to go inside because the knee was hurting so bad. He's had a cortisone injection a while back but it did not help. Asking for a med that will help with the knee pain

## 2024-11-07 NOTE — TELEPHONE ENCOUNTER
----- Message from Med Assistant Prado sent at 11/7/2024 11:56 AM CST -----  Contact: Tip @ [phone]  The patient is calling to ask the provider to send in pain medication for his leg. Says he is in a lot of pain. Please give him a call back at [phone].      [facility]/pharmacy #[id number] - [place], [place] - [address] [facility] [address] [facility] [address]  Phone: [phone] Fax: [phone]

## 2024-11-08 DIAGNOSIS — N18.32 STAGE 3B CHRONIC KIDNEY DISEASE: Primary | ICD-10-CM

## 2024-11-08 RX ORDER — CHLORTHALIDONE 25 MG/1
25 TABLET ORAL DAILY
Qty: 30 TABLET | Refills: 11 | Status: SHIPPED | OUTPATIENT
Start: 2024-11-08 | End: 2025-11-08

## 2024-11-08 RX ORDER — SODIUM BICARBONATE 650 MG/1
1300 TABLET ORAL 2 TIMES DAILY
Qty: 180 TABLET | Refills: 3 | Status: SHIPPED | OUTPATIENT
Start: 2024-11-08

## 2024-11-08 NOTE — PROGRESS NOTES
Labs reviewed with patient.    Acidosis - increase NaBicarb to 1300mg BID    HTN - replaced HCTZ with chlorthalidone 25 qd    Hyperkalemia - Lokelma qd x 3 days    He verbalized understanding. RTC 1 month with labs

## 2024-12-04 RX ORDER — SPIRONOLACTONE 50 MG/1
50 TABLET, FILM COATED ORAL
Qty: 90 TABLET | Refills: 3 | Status: SHIPPED | OUTPATIENT
Start: 2024-12-04

## 2025-02-10 ENCOUNTER — LAB VISIT (OUTPATIENT)
Dept: LAB | Facility: HOSPITAL | Age: 85
End: 2025-02-10
Attending: INTERNAL MEDICINE
Payer: MEDICARE

## 2025-02-10 DIAGNOSIS — N18.32 STAGE 3B CHRONIC KIDNEY DISEASE: ICD-10-CM

## 2025-02-10 DIAGNOSIS — D47.2 MGUS (MONOCLONAL GAMMOPATHY OF UNKNOWN SIGNIFICANCE): ICD-10-CM

## 2025-02-10 LAB
ALBUMIN SERPL BCP-MCNC: 3.6 G/DL (ref 3.5–5.2)
ANION GAP SERPL CALC-SCNC: 7 MMOL/L (ref 8–16)
BUN SERPL-MCNC: 22 MG/DL (ref 8–23)
CALCIUM SERPL-MCNC: 9.6 MG/DL (ref 8.7–10.5)
CHLORIDE SERPL-SCNC: 108 MMOL/L (ref 95–110)
CO2 SERPL-SCNC: 22 MMOL/L (ref 23–29)
CREAT SERPL-MCNC: 1.9 MG/DL (ref 0.5–1.4)
ERYTHROCYTE [DISTWIDTH] IN BLOOD BY AUTOMATED COUNT: 14.1 % (ref 11.5–14.5)
EST. GFR  (NO RACE VARIABLE): 34 ML/MIN/1.73 M^2
GLUCOSE SERPL-MCNC: 98 MG/DL (ref 70–110)
HCT VFR BLD AUTO: 30.7 % (ref 40–54)
HGB BLD-MCNC: 9.7 G/DL (ref 14–18)
IGA SERPL-MCNC: 238 MG/DL (ref 40–350)
IGG SERPL-MCNC: 1087 MG/DL (ref 650–1600)
IGM SERPL-MCNC: 1166 MG/DL (ref 50–300)
MCH RBC QN AUTO: 28.9 PG (ref 27–31)
MCHC RBC AUTO-ENTMCNC: 31.6 G/DL (ref 32–36)
MCV RBC AUTO: 91 FL (ref 82–98)
PHOSPHATE SERPL-MCNC: 3 MG/DL (ref 2.7–4.5)
PLATELET # BLD AUTO: 369 K/UL (ref 150–450)
PMV BLD AUTO: 9.6 FL (ref 9.2–12.9)
POTASSIUM SERPL-SCNC: 5 MMOL/L (ref 3.5–5.1)
PTH-INTACT SERPL-MCNC: 129.1 PG/ML (ref 9–77)
RBC # BLD AUTO: 3.36 M/UL (ref 4.6–6.2)
SODIUM SERPL-SCNC: 137 MMOL/L (ref 136–145)
WBC # BLD AUTO: 8.15 K/UL (ref 3.9–12.7)

## 2025-02-10 PROCEDURE — 83521 IG LIGHT CHAINS FREE EACH: CPT | Mod: 59 | Performed by: STUDENT IN AN ORGANIZED HEALTH CARE EDUCATION/TRAINING PROGRAM

## 2025-02-10 PROCEDURE — 82306 VITAMIN D 25 HYDROXY: CPT | Performed by: NURSE PRACTITIONER

## 2025-02-10 PROCEDURE — 85027 COMPLETE CBC AUTOMATED: CPT | Performed by: NURSE PRACTITIONER

## 2025-02-10 PROCEDURE — 84165 PROTEIN E-PHORESIS SERUM: CPT | Performed by: STUDENT IN AN ORGANIZED HEALTH CARE EDUCATION/TRAINING PROGRAM

## 2025-02-10 PROCEDURE — 82784 ASSAY IGA/IGD/IGG/IGM EACH: CPT | Mod: 59 | Performed by: STUDENT IN AN ORGANIZED HEALTH CARE EDUCATION/TRAINING PROGRAM

## 2025-02-10 PROCEDURE — 84165 PROTEIN E-PHORESIS SERUM: CPT | Mod: 26,,, | Performed by: PATHOLOGY

## 2025-02-10 PROCEDURE — 83970 ASSAY OF PARATHORMONE: CPT | Performed by: NURSE PRACTITIONER

## 2025-02-10 PROCEDURE — 86334 IMMUNOFIX E-PHORESIS SERUM: CPT | Mod: 26,,, | Performed by: PATHOLOGY

## 2025-02-10 PROCEDURE — 82784 ASSAY IGA/IGD/IGG/IGM EACH: CPT | Performed by: STUDENT IN AN ORGANIZED HEALTH CARE EDUCATION/TRAINING PROGRAM

## 2025-02-10 PROCEDURE — 80069 RENAL FUNCTION PANEL: CPT | Performed by: NURSE PRACTITIONER

## 2025-02-10 PROCEDURE — 36415 COLL VENOUS BLD VENIPUNCTURE: CPT | Mod: PO | Performed by: STUDENT IN AN ORGANIZED HEALTH CARE EDUCATION/TRAINING PROGRAM

## 2025-02-10 PROCEDURE — 86334 IMMUNOFIX E-PHORESIS SERUM: CPT | Performed by: STUDENT IN AN ORGANIZED HEALTH CARE EDUCATION/TRAINING PROGRAM

## 2025-02-11 LAB
25(OH)D3+25(OH)D2 SERPL-MCNC: 34 NG/ML (ref 30–96)
ALBUMIN SERPL ELPH-MCNC: 3.9 G/DL (ref 3.35–5.55)
ALPHA1 GLOB SERPL ELPH-MCNC: 0.33 G/DL (ref 0.17–0.41)
ALPHA2 GLOB SERPL ELPH-MCNC: 1.09 G/DL (ref 0.43–0.99)
B-GLOBULIN SERPL ELPH-MCNC: 0.94 G/DL (ref 0.5–1.1)
GAMMA GLOB SERPL ELPH-MCNC: 1.74 G/DL (ref 0.67–1.58)
INTERPRETATION SERPL IFE-IMP: NORMAL
KAPPA LC SER QL IA: 4.82 MG/DL (ref 0.33–1.94)
KAPPA LC/LAMBDA SER IA: 0.22 (ref 0.26–1.65)
LAMBDA LC SER QL IA: 22.02 MG/DL (ref 0.57–2.63)
PROT SERPL-MCNC: 8 G/DL (ref 6–8.4)

## 2025-02-12 ENCOUNTER — TELEPHONE (OUTPATIENT)
Dept: HEMATOLOGY/ONCOLOGY | Facility: CLINIC | Age: 85
End: 2025-02-12
Payer: MEDICARE

## 2025-02-12 LAB
PATHOLOGIST INTERPRETATION IFE: NORMAL
PATHOLOGIST INTERPRETATION SPE: NORMAL

## 2025-02-12 NOTE — TELEPHONE ENCOUNTER
----- Message from Keyanna Campbell MD sent at 2/12/2025  8:40 AM CST -----  Please make sure that he has a follow up later this month to review labs  ----- Message -----  From: [facility]  Sent: 2/10/2025  10:12 PM CST  To: Keyanna Campbell MD

## 2025-02-25 ENCOUNTER — LAB VISIT (OUTPATIENT)
Dept: LAB | Facility: HOSPITAL | Age: 85
End: 2025-02-25
Attending: STUDENT IN AN ORGANIZED HEALTH CARE EDUCATION/TRAINING PROGRAM
Payer: MEDICARE

## 2025-02-25 DIAGNOSIS — D47.2 MGUS (MONOCLONAL GAMMOPATHY OF UNKNOWN SIGNIFICANCE): ICD-10-CM

## 2025-02-25 LAB
IGA SERPL-MCNC: 248 MG/DL (ref 40–350)
IGG SERPL-MCNC: 1131 MG/DL (ref 650–1600)
IGM SERPL-MCNC: 1277 MG/DL (ref 50–300)

## 2025-02-25 PROCEDURE — 84166 PROTEIN E-PHORESIS/URINE/CSF: CPT | Mod: 26,,, | Performed by: PATHOLOGY

## 2025-02-25 PROCEDURE — 84156 ASSAY OF PROTEIN URINE: CPT | Performed by: STUDENT IN AN ORGANIZED HEALTH CARE EDUCATION/TRAINING PROGRAM

## 2025-02-25 PROCEDURE — 86334 IMMUNOFIX E-PHORESIS SERUM: CPT | Performed by: STUDENT IN AN ORGANIZED HEALTH CARE EDUCATION/TRAINING PROGRAM

## 2025-02-25 PROCEDURE — 82784 ASSAY IGA/IGD/IGG/IGM EACH: CPT | Mod: 59 | Performed by: STUDENT IN AN ORGANIZED HEALTH CARE EDUCATION/TRAINING PROGRAM

## 2025-02-25 PROCEDURE — 84166 PROTEIN E-PHORESIS/URINE/CSF: CPT | Performed by: STUDENT IN AN ORGANIZED HEALTH CARE EDUCATION/TRAINING PROGRAM

## 2025-02-25 PROCEDURE — 84165 PROTEIN E-PHORESIS SERUM: CPT | Performed by: STUDENT IN AN ORGANIZED HEALTH CARE EDUCATION/TRAINING PROGRAM

## 2025-02-25 PROCEDURE — 82784 ASSAY IGA/IGD/IGG/IGM EACH: CPT | Performed by: STUDENT IN AN ORGANIZED HEALTH CARE EDUCATION/TRAINING PROGRAM

## 2025-02-25 PROCEDURE — 36415 COLL VENOUS BLD VENIPUNCTURE: CPT | Performed by: STUDENT IN AN ORGANIZED HEALTH CARE EDUCATION/TRAINING PROGRAM

## 2025-02-25 PROCEDURE — 86335 IMMUNFIX E-PHORSIS/URINE/CSF: CPT | Performed by: STUDENT IN AN ORGANIZED HEALTH CARE EDUCATION/TRAINING PROGRAM

## 2025-02-25 PROCEDURE — 84156 ASSAY OF PROTEIN URINE: CPT | Mod: 91 | Performed by: STUDENT IN AN ORGANIZED HEALTH CARE EDUCATION/TRAINING PROGRAM

## 2025-02-25 PROCEDURE — 86335 IMMUNFIX E-PHORSIS/URINE/CSF: CPT | Mod: 26,,, | Performed by: PATHOLOGY

## 2025-02-25 PROCEDURE — 83521 IG LIGHT CHAINS FREE EACH: CPT | Performed by: STUDENT IN AN ORGANIZED HEALTH CARE EDUCATION/TRAINING PROGRAM

## 2025-02-25 PROCEDURE — 86334 IMMUNOFIX E-PHORESIS SERUM: CPT | Mod: 26,,, | Performed by: PATHOLOGY

## 2025-02-25 PROCEDURE — 84165 PROTEIN E-PHORESIS SERUM: CPT | Mod: 26,,, | Performed by: PATHOLOGY

## 2025-02-26 LAB
ALBUMIN SERPL ELPH-MCNC: 4.13 G/DL (ref 3.35–5.55)
ALPHA1 GLOB SERPL ELPH-MCNC: 0.34 G/DL (ref 0.17–0.41)
ALPHA2 GLOB SERPL ELPH-MCNC: 1.11 G/DL (ref 0.43–0.99)
B-GLOBULIN SERPL ELPH-MCNC: 0.97 G/DL (ref 0.5–1.1)
GAMMA GLOB SERPL ELPH-MCNC: 1.85 G/DL (ref 0.67–1.58)
INTERPRETATION SERPL IFE-IMP: NORMAL
KAPPA LC SER QL IA: 5.09 MG/DL (ref 0.33–1.94)
KAPPA LC/LAMBDA SER IA: 0.21 (ref 0.26–1.65)
LAMBDA LC SER QL IA: 23.95 MG/DL (ref 0.57–2.63)
PROT 24H UR-MRATE: 1114 MG/SPEC (ref 0–100)
PROT SERPL-MCNC: 8.4 G/DL (ref 6–8.4)
PROT UR-MCNC: 35 MG/DL
PROT UR-MCNC: 48 MG/DL (ref 0–15)
URINE COLLECTION DURATION: 24 HR
URINE VOLUME: 2320 ML

## 2025-02-27 LAB
INTERPRETATION UR IFE-IMP: NORMAL
PATHOLOGIST INTERPRETATION IFE: NORMAL
PATHOLOGIST INTERPRETATION SPE: NORMAL
PROT PATTERN UR ELPH-IMP: NORMAL

## 2025-02-28 ENCOUNTER — OFFICE VISIT (OUTPATIENT)
Dept: HEMATOLOGY/ONCOLOGY | Facility: CLINIC | Age: 85
End: 2025-02-28
Payer: MEDICARE

## 2025-02-28 VITALS
OXYGEN SATURATION: 98 % | WEIGHT: 229.75 LBS | HEIGHT: 75 IN | BODY MASS INDEX: 28.57 KG/M2 | SYSTOLIC BLOOD PRESSURE: 180 MMHG | DIASTOLIC BLOOD PRESSURE: 76 MMHG

## 2025-02-28 DIAGNOSIS — I10 HTN (HYPERTENSION), BENIGN: ICD-10-CM

## 2025-02-28 DIAGNOSIS — N18.4 CHRONIC KIDNEY DISEASE (CKD), STAGE IV (SEVERE): ICD-10-CM

## 2025-02-28 DIAGNOSIS — D47.2 MGUS (MONOCLONAL GAMMOPATHY OF UNKNOWN SIGNIFICANCE): Primary | ICD-10-CM

## 2025-02-28 DIAGNOSIS — E78.00 HYPERCHOLESTEROLEMIA: ICD-10-CM

## 2025-02-28 DIAGNOSIS — N28.9 FUNCTION KIDNEY DECREASED: ICD-10-CM

## 2025-02-28 DIAGNOSIS — Z85.46 HISTORY OF PROSTATE CANCER: ICD-10-CM

## 2025-02-28 DIAGNOSIS — N40.0 BENIGN PROSTATIC HYPERPLASIA, UNSPECIFIED WHETHER LOWER URINARY TRACT SYMPTOMS PRESENT: ICD-10-CM

## 2025-02-28 DIAGNOSIS — N18.30 ANEMIA IN STAGE 3 CHRONIC KIDNEY DISEASE, UNSPECIFIED WHETHER STAGE 3A OR 3B CKD: ICD-10-CM

## 2025-02-28 DIAGNOSIS — D63.1 ANEMIA IN STAGE 3 CHRONIC KIDNEY DISEASE, UNSPECIFIED WHETHER STAGE 3A OR 3B CKD: ICD-10-CM

## 2025-02-28 PROCEDURE — 99999 PR PBB SHADOW E&M-EST. PATIENT-LVL IV: CPT | Mod: PBBFAC,,, | Performed by: STUDENT IN AN ORGANIZED HEALTH CARE EDUCATION/TRAINING PROGRAM

## 2025-02-28 NOTE — Clinical Note
-RTC in 3 months for CBC, iron, ferritin only -RTC in 6 months for MD visit and all [facility] labs 1 weeks prior

## 2025-02-28 NOTE — PROGRESS NOTES
Hematology- Oncology Clinic Note :     2/28/2025    Chief Complaint   Patient presents with    MGUS    Results         HPI    Pt is a 84 y.o. male who  has a past medical history of Hyperlipidemia and Hypertension. Who presents for follow up pf MGUS with family.  Labs largely stable and reviewed with pt and no new issues reported.  As anemia has slightly worsened he will start taking daily multivitamin and we will monitor anemia closely.        Active Problem List with Overview Notes    Diagnosis Date Noted    Chronic kidney disease (CKD), stage IV (severe) 08/25/2023    Type 2 diabetes mellitus with other circulatory complications 01/31/2023    Secondary hyperparathyroidism of renal origin 01/31/2023    Essential (hemorrhagic) thrombocythemia 01/31/2023    Atherosclerosis of aorta 01/31/2023    Chronic kidney disease, stage 3a 01/31/2023    Prostate cancer 01/06/2020    Idiopathic gout of right foot 06/19/2018    BPH (benign prostatic hypertrophy) 09/01/2012     Dx updated per 2019 IMO Load      Hypercholesterolemia 08/21/2012    HTN (hypertension), benign 08/21/2012       Patient Active Problem List    Diagnosis Date Noted    Chronic kidney disease (CKD), stage IV (severe) 08/25/2023    Type 2 diabetes mellitus with other circulatory complications 01/31/2023    Secondary hyperparathyroidism of renal origin 01/31/2023    Essential (hemorrhagic) thrombocythemia 01/31/2023    Atherosclerosis of aorta 01/31/2023    Chronic kidney disease, stage 3a 01/31/2023    Prostate cancer 01/06/2020    Idiopathic gout of right foot 06/19/2018    BPH (benign prostatic hypertrophy) 09/01/2012    Hypercholesterolemia 08/21/2012    HTN (hypertension), benign 08/21/2012     Past Medical History:   Diagnosis Date    Hyperlipidemia     Hypertension       Past Surgical History:   Procedure Laterality Date    LEG SURGERY      PROSTATE SURGERY        (Not in a hospital admission)    Review of patient's allergies indicates:  No Known  Allergies   Social History     Tobacco Use    Smoking status: Never    Smokeless tobacco: Never   Substance Use Topics    Alcohol use: No      Family History   Problem Relation Name Age of Onset    Diabetes Sister Carlyn     Heart disease Sister Carlyn     Aneurysm Brother Steve     Cancer Brother Roderick         Review of Systems :  Review of Systems   Constitutional:  Negative for fever, malaise/fatigue and weight loss.   HENT:  Negative for congestion, hearing loss and nosebleeds.    Eyes:  Negative for blurred vision and discharge.   Respiratory:  Negative for cough, sputum production and shortness of breath.    Cardiovascular:  Negative for chest pain and leg swelling.   Gastrointestinal:  Negative for abdominal pain, blood in stool, constipation, diarrhea, heartburn, melena, nausea and vomiting.   Genitourinary:  Negative for dysuria and hematuria.   Musculoskeletal:  Positive for joint pain. Negative for myalgias.   Skin:  Negative for itching and rash.   Neurological:  Negative for dizziness, focal weakness and weakness.   Endo/Heme/Allergies:  Does not bruise/bleed easily.   Psychiatric/Behavioral:  Negative for depression. The patient is not nervous/anxious.        Physical Exam :  Wt Readings from Last 3 Encounters:   02/28/25 104.2 kg (229 lb 11.5 oz)   10/11/24 104 kg (229 lb 4.5 oz)   08/08/24 104 kg (229 lb 4.5 oz)     Temp Readings from Last 3 Encounters:   08/04/22 98.2 °F (36.8 °C)   12/30/21 99.4 °F (37.4 °C) (Oral)   03/09/21 97.7 °F (36.5 °C) (Oral)     BP Readings from Last 3 Encounters:   02/28/25 (!) 180/76   10/11/24 (!) 170/64   08/08/24 (!) 174/64     Pulse Readings from Last 3 Encounters:   02/28/25 (P) 89   10/11/24 97   08/08/24 85     Body mass index is 28.71 kg/m².    Physical Exam  Vitals reviewed.   Constitutional:       Appearance: Normal appearance.      Comments: Walks with cane   HENT:      Head: Normocephalic and atraumatic.      Nose: Nose normal.      Mouth/Throat:      Mouth:  Mucous membranes are moist.   Eyes:      Pupils: Pupils are equal, round, and reactive to light.   Cardiovascular:      Rate and Rhythm: Normal rate and regular rhythm.      Heart sounds: Normal heart sounds.   Pulmonary:      Effort: Pulmonary effort is normal.      Breath sounds: Normal breath sounds.   Abdominal:      General: Abdomen is flat.   Musculoskeletal:         General: Normal range of motion.      Cervical back: Normal range of motion and neck supple.   Skin:     General: Skin is warm and dry.   Neurological:      Mental Status: He is alert. Mental status is at baseline.   Psychiatric:         Mood and Affect: Mood normal.         Behavior: Behavior normal.           Pertinent Diagnostic studies:    No results found for this or any previous visit (from the past 24 hours).    Assessment/Plan :       Hx of IgM MGUS  -Stable  -no showed to march 2020 bone marrow biopsy  -asymptomatic  -Ok to defer more imaging and marrow  In light no clinical or biochemical progression, advanced age and pts wishes   -Mild anemnia stable from >10 yrs most likely from ckd   -Due age, comorbidites and no overt progression can defer marrow and recommend fu in 6 months as labs have been largely stable so far but can consider marrow if further decline in anemia or worsening of M protein   -RTC in 3 months for CBC, iron, ferritin only to monitor anemia, pt to take daily multivitamin for now  -RTC in 6 months for MD visit and all MGUS labs 1 weeks prior      CKD stage 3b    - Baseline sCr  range 1.5-1.8 since 2018. CKD clinically most likely related to HTN  - Following with hem/onc for IgM monoclonal gammopathy. Labs stable and paraproteinemia unlikely contributing to worsening renal dysfunction  - Avoid NSAIDs, maintain hydration, recommend further BP control with changes as below   - Follows with nephrology       HTN   - Not well controlled, low salt diet suggested   - Continue losartan 100, HCTZ 12.5, nifedipine 90  -  Spironolactone to 50mg qd  - Per PCP       Hx of Gout   - No recent flares. Maintained on allopurinol 100mg qd. Continue. Monitor uric acid.         Hx of prostate cancer  -He has history of prostate cancer s/p prostatectomy in 2006. No known recurrence. No adjuvant treatments reported. Path unknown.    -He has had biochemical recurrence per PSA testing beginning in 2010 with a very slow rise in PSA in the interim but has now decreased   -In 2018 he had incidental finding of a possible bladder lesion on ultrasound.  Cystoscopy was normal at that time.  -per urology, can discuss with pt tx options if PSA continues to rise but due to age and co morbidities pt would like to observe for now      Visit today included increased complexity associated with the care of the episodic problem MGUS addressed and managing the longitudinal care of the patient due to the serious and/or complex managed problem(s).     Time spent on case: 30 minutes    Summary of orders placed this encounter:  Orders Placed This Encounter   Procedures    Immunoglobulin Free LT Chains Blood    IgA    IgG    IgM    Immunofixation, 24 hour Urine    Protein electrophoresis, timed urine    Protein Electrophoresis, Serum    Immunofixation Electrophoresis    Immunofixation, 24 hour Urine    CMP    CBC Auto Differential    Ferritin    Iron and TIBC       Future Appointments   Date Time Provider Department Center   3/26/2025  1:30 PM Joanie Huynh, [facility] NEPHRO [place]   4/9/2025 11:00 AM LAB, [facility] LAB [facility] [place], [place]   4/14/2025 10:40 AM Chloé Kay MD [facility] URO [facility] [place] [facility]   5/28/2025  7:00 AM LAB, [facility] LAB [facility] [place], [place]   8/28/2025  7:00 AM LAB, [facility]         Keyanna Campbell MD   Hematology/oncology, [facility] [place]

## 2025-03-03 LAB
PATHOLOGIST INTERPRETATION UIFE: NORMAL
PATHOLOGIST INTERPRETATION UPE: NORMAL

## 2025-03-11 ENCOUNTER — PATIENT MESSAGE (OUTPATIENT)
Dept: ADMINISTRATIVE | Facility: HOSPITAL | Age: 85
End: 2025-03-11
Payer: MEDICARE

## 2025-04-09 ENCOUNTER — LAB VISIT (OUTPATIENT)
Dept: LAB | Facility: HOSPITAL | Age: 85
End: 2025-04-09
Attending: NURSE PRACTITIONER
Payer: MEDICARE

## 2025-04-09 DIAGNOSIS — C61 PROSTATE CANCER: ICD-10-CM

## 2025-04-09 LAB — PSA SERPL-MCNC: 0.28 NG/ML

## 2025-04-09 PROCEDURE — 36415 COLL VENOUS BLD VENIPUNCTURE: CPT

## 2025-04-09 PROCEDURE — 84153 ASSAY OF PSA TOTAL: CPT

## 2025-04-10 ENCOUNTER — RESULTS FOLLOW-UP (OUTPATIENT)
Dept: UROLOGY | Facility: CLINIC | Age: 85
End: 2025-04-10

## 2025-04-14 ENCOUNTER — OFFICE VISIT (OUTPATIENT)
Dept: UROLOGY | Facility: CLINIC | Age: 85
End: 2025-04-14
Payer: MEDICARE

## 2025-04-14 DIAGNOSIS — R97.21 BIOCHEMICALLY RECURRENT MALIGNANT NEOPLASM OF PROSTATE: ICD-10-CM

## 2025-04-14 DIAGNOSIS — C61 BIOCHEMICALLY RECURRENT MALIGNANT NEOPLASM OF PROSTATE: ICD-10-CM

## 2025-04-14 DIAGNOSIS — C61 PROSTATE CANCER: Primary | ICD-10-CM

## 2025-04-14 PROCEDURE — 1159F MED LIST DOCD IN RCRD: CPT | Mod: CPTII,S$GLB,, | Performed by: UROLOGY

## 2025-04-14 PROCEDURE — 3288F FALL RISK ASSESSMENT DOCD: CPT | Mod: CPTII,S$GLB,, | Performed by: UROLOGY

## 2025-04-14 PROCEDURE — 1160F RVW MEDS BY RX/DR IN RCRD: CPT | Mod: CPTII,S$GLB,, | Performed by: UROLOGY

## 2025-04-14 PROCEDURE — 1125F AMNT PAIN NOTED PAIN PRSNT: CPT | Mod: CPTII,S$GLB,, | Performed by: UROLOGY

## 2025-04-14 PROCEDURE — 99999 PR PBB SHADOW E&M-EST. PATIENT-LVL III: CPT | Mod: PBBFAC,,, | Performed by: UROLOGY

## 2025-04-14 PROCEDURE — G2211 COMPLEX E/M VISIT ADD ON: HCPCS | Mod: S$GLB,,, | Performed by: UROLOGY

## 2025-04-14 PROCEDURE — 1101F PT FALLS ASSESS-DOCD LE1/YR: CPT | Mod: CPTII,S$GLB,, | Performed by: UROLOGY

## 2025-04-14 PROCEDURE — 3072F LOW RISK FOR RETINOPATHY: CPT | Mod: CPTII,S$GLB,, | Performed by: UROLOGY

## 2025-04-14 PROCEDURE — 99213 OFFICE O/P EST LOW 20 MIN: CPT | Mod: S$GLB,,, | Performed by: UROLOGY

## 2025-04-14 NOTE — PROGRESS NOTES
Subjective:       Tip Hurst is a 84 y.o. male who is an established patient who was seen  for evaluation of PCa.      New to me. History of PCa s/p prostatectomy in 2006. BCR starting in 2010 with very slow rise. He has not undergone adjuvant treatment.     In 2018 he had incidental finding of a possible bladder lesion on ultrasound.  Cystoscopy was normal at that time.     Voiding well. Denies dysuria and gross hematuria. No complaints.     PSA:  4/05 - 4.2  7/06 - 6.6  8/07 - <0.01  ...  3/20 - 0.39  9/22 - 1.1  4/23 - 1.6  10/23 - 0.58  4/24 - 0.27  4/25 - 0.28      The following portions of the patient's history were reviewed and updated as appropriate: allergies, current medications, past family history, past medical history, past social history, past surgical history and problem list.    Review of Systems  Twelve point review of systems completed. Pertinent positive and negatives listed in HPI.      Objective:    Vitals: There were no vitals taken for this visit.    Physical Exam   General: well developed, well nourished in no acute distress  Head: normocephalic, atraumatic  Neck: no obvious enlargement of thyroid  HEENT: EOMI, mucus membranes moist, sclera anicteric, no hearing impairment  Lungs: symmetric expansion, non-labored breathing  Neuro: alert and oriented x 3, no gross deficits  Psych: normal judgment and insight, normal mood/affect and non-anxious  Genitourinary:   deferred   SANDIE: deferred      Lab Review   Urine analysis today in clinic shows - no urine     Lab Results   Component Value Date    WBC 8.15 02/10/2025    HGB 9.7 (L) 02/10/2025    HCT 30.7 (L) 02/10/2025    MCV 91 02/10/2025     02/10/2025     Lab Results   Component Value Date    CREATININE 1.9 (H) 02/10/2025    BUN 22 02/10/2025     Lab Results   Component Value Date    PSA 0.28 04/09/2025    PSA 0.41 01/16/2020     Lab Results   Component Value Date    PSADIAG 0.28 10/11/2024       Imaging  LAKIA 8/2023  Reports and images  were personally reviewed by me and discussed with the patient today         Assessment/Plan:      1. Prostate cancer    - Prostatectomy 2006   - BCR noted but PSA remains stable/low   - Annual PSA checks     2. BCR   - PSA low/stable   - Would not recommend adjuvant treatment      Follow up in 1 year with PSA       Visit today included increased complexity associated with the care of the episodic problem PCa addressed and managing the longitudinal care of the patient due to the serious and/or complex managed problem(s) PCa, BCR.

## 2025-06-19 ENCOUNTER — OFFICE VISIT (OUTPATIENT)
Dept: INTERNAL MEDICINE | Facility: CLINIC | Age: 85
End: 2025-06-19
Attending: INTERNAL MEDICINE
Payer: MEDICARE

## 2025-06-19 VITALS
HEART RATE: 71 BPM | BODY MASS INDEX: 28.04 KG/M2 | DIASTOLIC BLOOD PRESSURE: 75 MMHG | SYSTOLIC BLOOD PRESSURE: 142 MMHG | WEIGHT: 225.5 LBS | OXYGEN SATURATION: 98 % | HEIGHT: 75 IN

## 2025-06-19 DIAGNOSIS — G89.29 CHRONIC PAIN OF LEFT KNEE: ICD-10-CM

## 2025-06-19 DIAGNOSIS — E11.59 TYPE 2 DIABETES MELLITUS WITH OTHER CIRCULATORY COMPLICATIONS: ICD-10-CM

## 2025-06-19 DIAGNOSIS — I10 HTN (HYPERTENSION), BENIGN: Primary | ICD-10-CM

## 2025-06-19 DIAGNOSIS — R23.4 CHANGES IN SKIN TEXTURE: ICD-10-CM

## 2025-06-19 DIAGNOSIS — N18.31 CHRONIC KIDNEY DISEASE, STAGE 3A: ICD-10-CM

## 2025-06-19 DIAGNOSIS — E78.00 HYPERCHOLESTEROLEMIA: ICD-10-CM

## 2025-06-19 DIAGNOSIS — N18.32 STAGE 3B CHRONIC KIDNEY DISEASE: ICD-10-CM

## 2025-06-19 DIAGNOSIS — N18.4 CHRONIC KIDNEY DISEASE (CKD), STAGE IV (SEVERE): ICD-10-CM

## 2025-06-19 DIAGNOSIS — N40.0 BENIGN PROSTATIC HYPERPLASIA, UNSPECIFIED WHETHER LOWER URINARY TRACT SYMPTOMS PRESENT: ICD-10-CM

## 2025-06-19 DIAGNOSIS — M25.562 CHRONIC PAIN OF LEFT KNEE: ICD-10-CM

## 2025-06-19 PROCEDURE — 1126F AMNT PAIN NOTED NONE PRSNT: CPT | Mod: CPTII,S$GLB,, | Performed by: INTERNAL MEDICINE

## 2025-06-19 PROCEDURE — 1159F MED LIST DOCD IN RCRD: CPT | Mod: CPTII,S$GLB,, | Performed by: INTERNAL MEDICINE

## 2025-06-19 PROCEDURE — 99999 PR PBB SHADOW E&M-EST. PATIENT-LVL III: CPT | Mod: PBBFAC,,, | Performed by: INTERNAL MEDICINE

## 2025-06-19 PROCEDURE — 1160F RVW MEDS BY RX/DR IN RCRD: CPT | Mod: CPTII,S$GLB,, | Performed by: INTERNAL MEDICINE

## 2025-06-19 PROCEDURE — 3072F LOW RISK FOR RETINOPATHY: CPT | Mod: CPTII,S$GLB,, | Performed by: INTERNAL MEDICINE

## 2025-06-19 PROCEDURE — 99214 OFFICE O/P EST MOD 30 MIN: CPT | Mod: S$GLB,,, | Performed by: INTERNAL MEDICINE

## 2025-06-19 PROCEDURE — G2211 COMPLEX E/M VISIT ADD ON: HCPCS | Mod: S$GLB,,, | Performed by: INTERNAL MEDICINE

## 2025-06-19 PROCEDURE — 1101F PT FALLS ASSESS-DOCD LE1/YR: CPT | Mod: CPTII,S$GLB,, | Performed by: INTERNAL MEDICINE

## 2025-06-19 PROCEDURE — 3288F FALL RISK ASSESSMENT DOCD: CPT | Mod: CPTII,S$GLB,, | Performed by: INTERNAL MEDICINE

## 2025-06-19 RX ORDER — SODIUM BICARBONATE 650 MG/1
1300 TABLET ORAL 2 TIMES DAILY
Qty: 360 TABLET | Refills: 3 | Status: SHIPPED | OUTPATIENT
Start: 2025-06-19

## 2025-06-19 RX ORDER — CHLORTHALIDONE 25 MG/1
25 TABLET ORAL DAILY
Qty: 90 TABLET | Refills: 3
Start: 2025-06-19 | End: 2026-06-19

## 2025-06-19 NOTE — PROGRESS NOTES
"Subjective:       Patient ID: Tip Hurst is a 84 y.o. male.    Chief Complaint: No chief complaint on file.    Here for routine f/u     Can only walk 50 ft. Max. Limited by knee pain. Not a good operative candidate at 84. Wheelchair for increased autonomy and mobility discussed. Needs to get out of house and away from tv.     History of Present Illness              Review of Systems   Constitutional:  Negative for appetite change, chills, fever and unexpected weight change.   HENT:  Negative for hearing loss, sore throat and trouble swallowing.    Eyes:  Negative for visual disturbance.   Respiratory:  Negative for cough, chest tightness and shortness of breath.    Cardiovascular:  Negative for chest pain and leg swelling.   Gastrointestinal:  Negative for abdominal pain, blood in stool, constipation, diarrhea, nausea and vomiting.   Endocrine: Negative for polydipsia and polyuria.   Genitourinary:  Negative for decreased urine volume, difficulty urinating, dysuria, frequency and urgency.   Musculoskeletal:  Positive for arthralgias and gait problem. Negative for back pain.   Skin:  Negative for rash.   Neurological:  Negative for dizziness and numbness.   Psychiatric/Behavioral:  The patient is not nervous/anxious.        Objective:      Vitals:    06/19/25 1523   BP: (!) 142/75   BP Location: Left arm   Patient Position: Sitting   Pulse: 71   SpO2: 98%   Weight: 102.3 kg (225 lb 8.5 oz)   Height: 6' 3" (1.905 m)      Physical Exam  Vitals and nursing note reviewed.   Constitutional:       General: He is not in acute distress.     Appearance: Normal appearance. He is well-developed.   HENT:      Head: Normocephalic and atraumatic.      Mouth/Throat:      Pharynx: No oropharyngeal exudate.   Eyes:      General: No scleral icterus.     Conjunctiva/sclera: Conjunctivae normal.      Pupils: Pupils are equal, round, and reactive to light.   Neck:      Thyroid: No thyromegaly.   Cardiovascular:      Rate and Rhythm: " Normal rate and regular rhythm.      Heart sounds: Normal heart sounds. No murmur heard.  Pulmonary:      Effort: Pulmonary effort is normal.      Breath sounds: Normal breath sounds. No wheezing or rales.   Abdominal:      General: There is no distension.   Musculoskeletal:         General: No tenderness.   Lymphadenopathy:      Cervical: No cervical adenopathy.   Skin:     General: Skin is warm and dry.   Neurological:      Mental Status: He is alert and oriented to person, place, and time.   Psychiatric:         Behavior: Behavior normal.         Assessment:       1. HTN (hypertension), benign    2. Hypercholesterolemia    3. Chronic kidney disease, stage 3a    4. Type 2 diabetes mellitus with other circulatory complications    5. Benign prostatic hyperplasia, unspecified whether lower urinary tract symptoms present    6. Chronic pain of left knee    7. Stage 3b chronic kidney disease    8. Chronic kidney disease (CKD), stage IV (severe)    9. Changes in skin texture        Plan:       Diagnoses and all orders for this visit:    HTN (hypertension), benign   Best ways to lower your blood pressure: Consume less than 2 grams of sodium in a day. Count it. Regular exercise: 30-45 minutes a day, 4-5 times a week, moderate paced walking. A diet that is predominately fruits and vegetables, low fat dairy, minimal saturated fat, whole grains (emphasis on whole), and weight loss. The more weight you lose the more benefit you get from each pound lost going forward.    Hypercholesterolemia   Controlled and asymptomatic.  Continue current Rx regimen.    Chronic kidney disease, stage 3a   Controlled and asymptomatic.  Continue current Rx regimen.    Type 2 diabetes mellitus with other circulatory complications  -     Hemoglobin A1C; Future    Benign prostatic hyperplasia, unspecified whether lower urinary tract symptoms present    Chronic pain of left knee  -     Ambulatory referral/consult to Physical Medicine Rehab;  Future    Stage 3b chronic kidney disease  -     Vitamin D; Future  -     CBC Without Differential; Future  -     sodium bicarbonate 650 MG tablet; Take 2 tablets (1,300 mg total) by mouth 2 (two) times daily.  -     chlorthalidone (HYGROTEN) 25 MG Tab; Take 1 tablet (25 mg total) by mouth once daily.    Chronic kidney disease (CKD), stage IV (severe)  -     TSH; Future    Changes in skin texture  -     TSH; Future    Other orders         Visit today is associated with current or anticipated ongoing medical care related to this patient's single serious condition/complex condition of CKD. The patient will return to see me as these issues will be followed longitudinally.    Assessment & Plan              This note was generated with the assistance of ambient listening technology. Verbal consent was obtained by the patient and accompanying visitor(s) for the recording of patient appointment to facilitate this note. I attest to having reviewed and edited the generated note for accuracy, though some syntax or spelling errors may persist. Please contact the author of this note for any clarification.      Moe Contreras MD  Internal Medicine-[facility]        Side effects of medication(s) were discussed in detail and patient voiced understanding.  Patient will call back for any issues or complications.

## 2025-06-23 ENCOUNTER — PATIENT MESSAGE (OUTPATIENT)
Dept: ADMINISTRATIVE | Facility: HOSPITAL | Age: 85
End: 2025-06-23
Payer: MEDICARE

## 2025-06-25 ENCOUNTER — LAB VISIT (OUTPATIENT)
Dept: LAB | Facility: HOSPITAL | Age: 85
End: 2025-06-25
Attending: INTERNAL MEDICINE
Payer: MEDICARE

## 2025-06-25 DIAGNOSIS — R23.4 CHANGES IN SKIN TEXTURE: ICD-10-CM

## 2025-06-25 DIAGNOSIS — E11.59 TYPE 2 DIABETES MELLITUS WITH OTHER CIRCULATORY COMPLICATIONS: ICD-10-CM

## 2025-06-25 DIAGNOSIS — N18.32 STAGE 3B CHRONIC KIDNEY DISEASE: ICD-10-CM

## 2025-06-25 DIAGNOSIS — N18.4 CHRONIC KIDNEY DISEASE (CKD), STAGE IV (SEVERE): ICD-10-CM

## 2025-06-25 LAB
ERYTHROCYTE [DISTWIDTH] IN BLOOD BY AUTOMATED COUNT: 13.5 % (ref 11.5–14.5)
HCT VFR BLD AUTO: 27.4 % (ref 40–54)
HGB BLD-MCNC: 8.7 GM/DL (ref 14–18)
MCH RBC QN AUTO: 28.3 PG (ref 27–31)
MCHC RBC AUTO-ENTMCNC: 31.8 G/DL (ref 32–36)
MCV RBC AUTO: 89 FL (ref 82–98)
PLATELET # BLD AUTO: 318 K/UL (ref 150–450)
PMV BLD AUTO: 8.6 FL (ref 9.2–12.9)
RBC # BLD AUTO: 3.07 M/UL (ref 4.6–6.2)
TSH SERPL-ACNC: 2.46 UIU/ML (ref 0.4–4)
WBC # BLD AUTO: 8.32 K/UL (ref 3.9–12.7)

## 2025-06-25 PROCEDURE — 85027 COMPLETE CBC AUTOMATED: CPT

## 2025-06-25 PROCEDURE — 82306 VITAMIN D 25 HYDROXY: CPT

## 2025-06-25 PROCEDURE — 84443 ASSAY THYROID STIM HORMONE: CPT

## 2025-06-25 PROCEDURE — 36415 COLL VENOUS BLD VENIPUNCTURE: CPT

## 2025-06-25 PROCEDURE — 83036 HEMOGLOBIN GLYCOSYLATED A1C: CPT

## 2025-06-26 LAB
25(OH)D3+25(OH)D2 SERPL-MCNC: 32 NG/ML (ref 30–96)
EAG (OHS): 100 MG/DL (ref 68–131)
HBA1C MFR BLD: 5.1 % (ref 4–5.6)

## 2025-06-27 ENCOUNTER — RESULTS FOLLOW-UP (OUTPATIENT)
Dept: INTERNAL MEDICINE | Facility: CLINIC | Age: 85
End: 2025-06-27

## 2025-07-31 ENCOUNTER — TELEPHONE (OUTPATIENT)
Dept: INTERNAL MEDICINE | Facility: CLINIC | Age: 85
End: 2025-07-31
Payer: MEDICARE

## 2025-07-31 DIAGNOSIS — R12 HEARTBURN: Primary | ICD-10-CM

## 2025-07-31 RX ORDER — SODIUM BICARBONATE 650 MG/1
1300 TABLET ORAL 2 TIMES DAILY
Qty: 360 TABLET | Refills: 3 | Status: SHIPPED | OUTPATIENT
Start: 2025-07-31

## 2025-07-31 NOTE — TELEPHONE ENCOUNTER
Telephone RuiAris, spoke with the patients daughter, she verbalized she would call back once she speaks with her dad

## 2025-07-31 NOTE — TELEPHONE ENCOUNTER
Medication Pended  Allergies Review  Lov 06/19/2025    Refill Encounter    PCP Visits: Recent Visits  Date Type Provider Dept   06/19/25 Office Visit Moe Gallardo MD [facility] Internal Medicine   Showing recent visits within past 360 days and meeting all other requirements  Future Appointments  No visits were found meeting these conditions.  Showing future appointments within next 720 days and meeting all other requirements      Last 3 Blood Pressure:   BP Readings from Last 3 Encounters:   06/19/25 (!) 142/75   02/28/25 (!) 180/76   10/11/24 (!) 170/64     Preferred Pharmacy:   [facility]/pharmacy #[id number] - [facility] [address]  [address] [address]  Phone: [phone] Fax: [phone]    Requested RX:  Requested Prescriptions     Pending Prescriptions Disp Refills    sodium bicarbonate 650 MG tablet 360 tablet 3     Sig: Take 2 tablets (1,300 mg total) by mouth 2 (two) times daily.      RX Route: Normal

## 2025-08-20 ENCOUNTER — TELEPHONE (OUTPATIENT)
Dept: INTERNAL MEDICINE | Facility: CLINIC | Age: 85
End: 2025-08-20
Payer: MEDICARE

## 2025-08-28 ENCOUNTER — LAB VISIT (OUTPATIENT)
Dept: LAB | Facility: HOSPITAL | Age: 85
End: 2025-08-28
Attending: STUDENT IN AN ORGANIZED HEALTH CARE EDUCATION/TRAINING PROGRAM
Payer: MEDICARE

## 2025-08-28 DIAGNOSIS — D63.1 ANEMIA IN STAGE 3 CHRONIC KIDNEY DISEASE, UNSPECIFIED WHETHER STAGE 3A OR 3B CKD: ICD-10-CM

## 2025-08-28 DIAGNOSIS — N28.9 FUNCTION KIDNEY DECREASED: ICD-10-CM

## 2025-08-28 DIAGNOSIS — D47.2 MGUS (MONOCLONAL GAMMOPATHY OF UNKNOWN SIGNIFICANCE): ICD-10-CM

## 2025-08-28 DIAGNOSIS — N18.30 ANEMIA IN STAGE 3 CHRONIC KIDNEY DISEASE, UNSPECIFIED WHETHER STAGE 3A OR 3B CKD: ICD-10-CM

## 2025-08-28 LAB
ABSOLUTE EOSINOPHIL (OHS): 0.43 K/UL
ABSOLUTE MONOCYTE (OHS): 0.58 K/UL (ref 0.3–1)
ABSOLUTE NEUTROPHIL COUNT (OHS): 3.52 K/UL (ref 1.8–7.7)
BASOPHILS # BLD AUTO: 0.09 K/UL
BASOPHILS NFR BLD AUTO: 1.2 %
ERYTHROCYTE [DISTWIDTH] IN BLOOD BY AUTOMATED COUNT: 13.3 % (ref 11.5–14.5)
FERRITIN SERPL-MCNC: 653 NG/ML (ref 20–300)
HCT VFR BLD AUTO: 30.4 % (ref 40–54)
HGB BLD-MCNC: 9.5 GM/DL (ref 14–18)
IMM GRANULOCYTES # BLD AUTO: 0.03 K/UL (ref 0–0.04)
IMM GRANULOCYTES NFR BLD AUTO: 0.4 % (ref 0–0.5)
IRON SATN MFR SERPL: 17 % (ref 20–50)
IRON SERPL-MCNC: 54 UG/DL (ref 45–160)
LYMPHOCYTES # BLD AUTO: 2.58 K/UL (ref 1–4.8)
MCH RBC QN AUTO: 28.2 PG (ref 27–31)
MCHC RBC AUTO-ENTMCNC: 31.3 G/DL (ref 32–36)
MCV RBC AUTO: 90 FL (ref 82–98)
NUCLEATED RBC (/100WBC) (OHS): 0 /100 WBC
PLATELET # BLD AUTO: 353 K/UL (ref 150–450)
PMV BLD AUTO: 9.2 FL (ref 9.2–12.9)
RBC # BLD AUTO: 3.37 M/UL (ref 4.6–6.2)
RELATIVE EOSINOPHIL (OHS): 5.9 %
RELATIVE LYMPHOCYTE (OHS): 35.7 % (ref 18–48)
RELATIVE MONOCYTE (OHS): 8 % (ref 4–15)
RELATIVE NEUTROPHIL (OHS): 48.8 % (ref 38–73)
TIBC SERPL-MCNC: 318 UG/DL (ref 250–450)
TRANSFERRIN SERPL-MCNC: 215 MG/DL (ref 200–375)
WBC # BLD AUTO: 7.23 K/UL (ref 3.9–12.7)

## 2025-08-28 PROCEDURE — 36415 COLL VENOUS BLD VENIPUNCTURE: CPT

## 2025-08-28 PROCEDURE — 82728 ASSAY OF FERRITIN: CPT

## 2025-08-28 PROCEDURE — 84466 ASSAY OF TRANSFERRIN: CPT

## 2025-08-28 PROCEDURE — 85025 COMPLETE CBC W/AUTO DIFF WBC: CPT
